# Patient Record
Sex: FEMALE | Race: WHITE | NOT HISPANIC OR LATINO | Employment: FULL TIME | URBAN - METROPOLITAN AREA
[De-identification: names, ages, dates, MRNs, and addresses within clinical notes are randomized per-mention and may not be internally consistent; named-entity substitution may affect disease eponyms.]

---

## 2023-11-01 ENCOUNTER — ANCILLARY PROCEDURE (OUTPATIENT)
Dept: GENERAL RADIOLOGY | Facility: CLINIC | Age: 53
End: 2023-11-01
Attending: NURSE PRACTITIONER
Payer: COMMERCIAL

## 2023-11-01 ENCOUNTER — OFFICE VISIT (OUTPATIENT)
Dept: FAMILY MEDICINE | Facility: CLINIC | Age: 53
End: 2023-11-01
Payer: COMMERCIAL

## 2023-11-01 VITALS
TEMPERATURE: 97.9 F | HEIGHT: 63 IN | SYSTOLIC BLOOD PRESSURE: 156 MMHG | OXYGEN SATURATION: 86 % | HEART RATE: 100 BPM | BODY MASS INDEX: 51.91 KG/M2 | WEIGHT: 293 LBS | DIASTOLIC BLOOD PRESSURE: 92 MMHG

## 2023-11-01 DIAGNOSIS — R06.02 SOB (SHORTNESS OF BREATH): Primary | ICD-10-CM

## 2023-11-01 DIAGNOSIS — Z23 NEED FOR VACCINATION: ICD-10-CM

## 2023-11-01 DIAGNOSIS — R06.02 SOB (SHORTNESS OF BREATH): ICD-10-CM

## 2023-11-01 DIAGNOSIS — I50.9 CONGESTIVE HEART FAILURE, UNSPECIFIED HF CHRONICITY, UNSPECIFIED HEART FAILURE TYPE (H): ICD-10-CM

## 2023-11-01 DIAGNOSIS — Z12.31 VISIT FOR SCREENING MAMMOGRAM: ICD-10-CM

## 2023-11-01 DIAGNOSIS — Z12.11 SCREEN FOR COLON CANCER: ICD-10-CM

## 2023-11-01 DIAGNOSIS — Z71.6 ENCOUNTER FOR TOBACCO USE CESSATION COUNSELING: ICD-10-CM

## 2023-11-01 DIAGNOSIS — R14.0 BLOATING: ICD-10-CM

## 2023-11-01 DIAGNOSIS — Z12.4 CERVICAL CANCER SCREENING: ICD-10-CM

## 2023-11-01 DIAGNOSIS — R94.31 NONSPECIFIC ABNORMAL ELECTROCARDIOGRAM (ECG) (EKG): ICD-10-CM

## 2023-11-01 DIAGNOSIS — Z11.59 NEED FOR HEPATITIS C SCREENING TEST: ICD-10-CM

## 2023-11-01 DIAGNOSIS — I10 HYPERTENSION, UNSPECIFIED TYPE: ICD-10-CM

## 2023-11-01 DIAGNOSIS — Z11.4 SCREENING FOR HIV (HUMAN IMMUNODEFICIENCY VIRUS): ICD-10-CM

## 2023-11-01 DIAGNOSIS — E66.01 MORBID OBESITY (H): ICD-10-CM

## 2023-11-01 DIAGNOSIS — F33.0 MILD RECURRENT MAJOR DEPRESSION (H): ICD-10-CM

## 2023-11-01 DIAGNOSIS — Z13.220 LIPID SCREENING: ICD-10-CM

## 2023-11-01 DIAGNOSIS — E11.59 TYPE 2 DIABETES MELLITUS WITH OTHER CIRCULATORY COMPLICATION, WITHOUT LONG-TERM CURRENT USE OF INSULIN (H): ICD-10-CM

## 2023-11-01 PROBLEM — E11.9 DIABETES MELLITUS, TYPE 2 (H): Status: ACTIVE | Noted: 2023-11-01

## 2023-11-01 LAB
ALBUMIN SERPL BCG-MCNC: 4 G/DL (ref 3.5–5.2)
ALP SERPL-CCNC: 93 U/L (ref 35–104)
ALT SERPL W P-5'-P-CCNC: 12 U/L (ref 0–50)
ANION GAP SERPL CALCULATED.3IONS-SCNC: 11 MMOL/L (ref 7–15)
AST SERPL W P-5'-P-CCNC: 33 U/L (ref 0–45)
BILIRUB SERPL-MCNC: 0.5 MG/DL
BUN SERPL-MCNC: 7.8 MG/DL (ref 6–20)
CALCIUM SERPL-MCNC: 11.1 MG/DL (ref 8.6–10)
CHLORIDE SERPL-SCNC: 97 MMOL/L (ref 98–107)
CHOLEST SERPL-MCNC: 170 MG/DL
CREAT SERPL-MCNC: 0.52 MG/DL (ref 0.51–0.95)
DEPRECATED HCO3 PLAS-SCNC: 31 MMOL/L (ref 22–29)
EGFRCR SERPLBLD CKD-EPI 2021: >90 ML/MIN/1.73M2
ERYTHROCYTE [DISTWIDTH] IN BLOOD BY AUTOMATED COUNT: 19.7 % (ref 10–15)
FSH SERPL IRP2-ACNC: 10.4 MIU/ML
GLUCOSE SERPL-MCNC: 103 MG/DL (ref 70–99)
HBA1C MFR BLD: 7.2 % (ref 0–5.6)
HCT VFR BLD AUTO: >60 % (ref 35–47)
HCV AB SERPL QL IA: NONREACTIVE
HDLC SERPL-MCNC: 34 MG/DL
HGB BLD-MCNC: 18.4 G/DL (ref 11.7–15.7)
HIV 1+2 AB+HIV1 P24 AG SERPL QL IA: NONREACTIVE
LDLC SERPL CALC-MCNC: 112 MG/DL
MCH RBC QN AUTO: 24 PG (ref 26.5–33)
MCHC RBC AUTO-ENTMCNC: 27.6 G/DL (ref 31.5–36.5)
MCV RBC AUTO: 87 FL (ref 78–100)
NONHDLC SERPL-MCNC: 136 MG/DL
PLATELET # BLD AUTO: 175 10E3/UL (ref 150–450)
POTASSIUM SERPL-SCNC: 4.9 MMOL/L (ref 3.4–5.3)
PROT SERPL-MCNC: 7.3 G/DL (ref 6.4–8.3)
RBC # BLD AUTO: 7.66 10E6/UL (ref 3.8–5.2)
SODIUM SERPL-SCNC: 139 MMOL/L (ref 135–145)
T4 FREE SERPL-MCNC: 1.35 NG/DL (ref 0.9–1.7)
TRIGL SERPL-MCNC: 119 MG/DL
TSH SERPL DL<=0.005 MIU/L-ACNC: 6.29 UIU/ML (ref 0.3–4.2)
WBC # BLD AUTO: 9.8 10E3/UL (ref 4–11)

## 2023-11-01 PROCEDURE — 93000 ELECTROCARDIOGRAM COMPLETE: CPT | Performed by: NURSE PRACTITIONER

## 2023-11-01 PROCEDURE — 84703 CHORIONIC GONADOTROPIN ASSAY: CPT | Performed by: NURSE PRACTITIONER

## 2023-11-01 PROCEDURE — 83001 ASSAY OF GONADOTROPIN (FSH): CPT | Performed by: NURSE PRACTITIONER

## 2023-11-01 PROCEDURE — 36415 COLL VENOUS BLD VENIPUNCTURE: CPT | Performed by: NURSE PRACTITIONER

## 2023-11-01 PROCEDURE — 86803 HEPATITIS C AB TEST: CPT | Performed by: NURSE PRACTITIONER

## 2023-11-01 PROCEDURE — 84439 ASSAY OF FREE THYROXINE: CPT | Performed by: NURSE PRACTITIONER

## 2023-11-01 PROCEDURE — 83036 HEMOGLOBIN GLYCOSYLATED A1C: CPT | Performed by: NURSE PRACTITIONER

## 2023-11-01 PROCEDURE — 87389 HIV-1 AG W/HIV-1&-2 AB AG IA: CPT | Performed by: NURSE PRACTITIONER

## 2023-11-01 PROCEDURE — 90715 TDAP VACCINE 7 YRS/> IM: CPT | Performed by: NURSE PRACTITIONER

## 2023-11-01 PROCEDURE — 85027 COMPLETE CBC AUTOMATED: CPT | Performed by: NURSE PRACTITIONER

## 2023-11-01 PROCEDURE — 90471 IMMUNIZATION ADMIN: CPT | Performed by: NURSE PRACTITIONER

## 2023-11-01 PROCEDURE — 80061 LIPID PANEL: CPT | Performed by: NURSE PRACTITIONER

## 2023-11-01 PROCEDURE — 71046 X-RAY EXAM CHEST 2 VIEWS: CPT | Mod: TC | Performed by: RADIOLOGY

## 2023-11-01 PROCEDURE — 80053 COMPREHEN METABOLIC PANEL: CPT | Performed by: NURSE PRACTITIONER

## 2023-11-01 PROCEDURE — 99204 OFFICE O/P NEW MOD 45 MIN: CPT | Mod: 25 | Performed by: NURSE PRACTITIONER

## 2023-11-01 PROCEDURE — 84443 ASSAY THYROID STIM HORMONE: CPT | Performed by: NURSE PRACTITIONER

## 2023-11-01 RX ORDER — ASPIRIN 81 MG/1
81 TABLET ORAL DAILY
Qty: 100 TABLET | Refills: 4 | Status: SHIPPED | OUTPATIENT
Start: 2023-11-01 | End: 2024-03-04

## 2023-11-01 RX ORDER — FUROSEMIDE 20 MG
20 TABLET ORAL DAILY
Qty: 14 TABLET | Refills: 0 | Status: SHIPPED | OUTPATIENT
Start: 2023-11-01 | End: 2023-11-21

## 2023-11-01 RX ORDER — METFORMIN HCL 500 MG
500 TABLET, EXTENDED RELEASE 24 HR ORAL 2 TIMES DAILY WITH MEALS
Qty: 180 TABLET | Refills: 0 | Status: SHIPPED | OUTPATIENT
Start: 2023-11-01 | End: 2024-01-30

## 2023-11-01 RX ORDER — LISINOPRIL 10 MG/1
10 TABLET ORAL DAILY
Qty: 30 TABLET | Refills: 0 | Status: SHIPPED | OUTPATIENT
Start: 2023-11-01 | End: 2023-11-21

## 2023-11-01 RX ORDER — BUPROPION HYDROCHLORIDE 150 MG/1
150 TABLET ORAL EVERY MORNING
Qty: 30 TABLET | Refills: 0 | Status: ON HOLD | OUTPATIENT
Start: 2023-11-01 | End: 2023-12-13

## 2023-11-01 NOTE — LETTER
November 2, 2023      Sammy Laws  [address] [place] [id number]        Dear ,    We are writing to inform you of your test results.    Lab results confirmed the diabetes that we had discussed.  The hormone test suggests that you are not postmenopausal and the calcium level is a bit high which both make me more concerned about the fluid in your abdomen.  You should try and get the CT scan done this week if possible.  Your thyroid level and cholesterol levels are a little abnormal and we will discuss those at your follow-up visit.    Resulted Orders   HIV Antigen Antibody Combo   Result Value Ref Range    HIV Antigen Antibody Combo Nonreactive Nonreactive      Comment:      HIV-1 p24 Ag & HIV-1/HIV-2 Ab Not Detected   Hepatitis C Screen Reflex to HCV RNA Quant and Genotype   Result Value Ref Range    Hepatitis C Antibody Nonreactive Nonreactive    Narrative    Assay performance characteristics have not been established for newborns, infants, and children.   Lipid panel reflex to direct LDL Non-fasting   Result Value Ref Range    Cholesterol 170 <200 mg/dL    Triglycerides 119 <150 mg/dL    Direct Measure HDL 34 (L) >=50 mg/dL    LDL Cholesterol Calculated 112 (H) <=100 mg/dL    Non HDL Cholesterol 136 (H) <130 mg/dL    Narrative    Cholesterol  Desirable:  <200 mg/dL    Triglycerides  Normal:  Less than 150 mg/dL  Borderline High:  150-199 mg/dL  High:  200-499 mg/dL  Very High:  Greater than or equal to 500 mg/dL    Direct Measure HDL  Female:  Greater than or equal to 50 mg/dL   Male:  Greater than or equal to 40 mg/dL    LDL Cholesterol  Desirable:  <100mg/dL  Above Desirable:  100-129 mg/dL   Borderline High:  130-159 mg/dL   High:  160-189 mg/dL   Very High:  >= 190 mg/dL    Non HDL Cholesterol  Desirable:  130 mg/dL  Above Desirable:  130-159 mg/dL  Borderline High:  160-189 mg/dL  High:  190-219 mg/dL  Very High:  Greater than or equal to 220 mg/dL   CBC with platelets   Result Value  Ref Range    WBC Count 9.8 4.0 - 11.0 10e3/uL    RBC Count 7.66 (H) 3.80 - 5.20 10e6/uL    Hemoglobin 18.4 (H) 11.7 - 15.7 g/dL    Hematocrit >60.0 (H) 35.0 - 47.0 %    MCV 87 78 - 100 fL    MCH 24.0 (L) 26.5 - 33.0 pg    MCHC 27.6 (L) 31.5 - 36.5 g/dL    RDW 19.7 (H) 10.0 - 15.0 %    Platelet Count 175 150 - 450 10e3/uL   Comprehensive metabolic panel   Result Value Ref Range    Sodium 139 135 - 145 mmol/L      Comment:      Reference intervals for this test were updated on 09/26/2023 to more accurately reflect our healthy population. There may be differences in the flagging of prior results with similar values performed with this method. Interpretation of those prior results can be made in the context of the updated reference intervals.     Potassium 4.9 3.4 - 5.3 mmol/L    Carbon Dioxide (CO2) 31 (H) 22 - 29 mmol/L    Anion Gap 11 7 - 15 mmol/L    Urea Nitrogen 7.8 6.0 - 20.0 mg/dL    Creatinine 0.52 0.51 - 0.95 mg/dL    GFR Estimate >90 >60 mL/min/1.73m2    Calcium 11.1 (H) 8.6 - 10.0 mg/dL    Chloride 97 (L) 98 - 107 mmol/L    Glucose 103 (H) 70 - 99 mg/dL    Alkaline Phosphatase 93 35 - 104 U/L    AST 33 0 - 45 U/L      Comment:      Reference intervals for this test were updated on 6/12/2023 to more accurately reflect our healthy population. There may be differences in the flagging of prior results with similar values performed with this method. Interpretation of those prior results can be made in the context of the updated reference intervals.    ALT 12 0 - 50 U/L      Comment:      Reference intervals for this test were updated on 6/12/2023 to more accurately reflect our healthy population. There may be differences in the flagging of prior results with similar values performed with this method. Interpretation of those prior results can be made in the context of the updated reference intervals.      Protein Total 7.3 6.4 - 8.3 g/dL    Albumin 4.0 3.5 - 5.2 g/dL    Bilirubin Total 0.5 <=1.2 mg/dL   TSH with  free T4 reflex   Result Value Ref Range    TSH 6.29 (H) 0.30 - 4.20 uIU/mL   Hemoglobin A1c   Result Value Ref Range    Hemoglobin A1C 7.2 (H) 0.0 - 5.6 %      Comment:      Normal <5.7%   Prediabetes 5.7-6.4%    Diabetes 6.5% or higher     Note: Adopted from ADA consensus guidelines.   Follicle stimulating hormone   Result Value Ref Range    FSH 10.4 mIU/mL      Comment:      19 years and older:   Follicular phase: 3.5-12.5 mIU/mL   Ovulation phase: 4.7-21.5 mIU/mL   Luteal phase: 1.7-7.7 mIU/mL   Postmenopause: 25.8-134.8 mIU/mL      T4 free   Result Value Ref Range    Free T4 1.35 0.90 - 1.70 ng/dL       If you have any questions or concerns, please call the clinic at the number listed above.       Sincerely,      Michell Lloyd NP

## 2023-11-01 NOTE — PROGRESS NOTES
Assessment & Plan     (R06.02) SOB (shortness of breath)  (primary encounter diagnosis)  Comment: xray appears as heart failur  Will start diuretic and ordered echo, set up with cardio    Concern for hepatic disease with reports of ascites and SOB and LE edema, CT as soon as creatinine returned  Plan: XR Chest 2 Views, TSH with free T4 reflex,         Hemoglobin A1c, EKG 12-lead complete w/read -         Clinics, EKG 12-lead complete w/read - Clinics,        Adult Cardiology Eval  Referral            (Z12.31) Visit for screening mammogram  Comment:   Plan: MA SCREENING DIGITAL BILAT - Future  (s+30)            (Z12.11) Screen for colon cancer  Comment:   Plan: no orders palced today    (Z11.4) Screening for HIV (human immunodeficiency virus)  Comment:   Plan: HIV Antigen Antibody Combo            (Z11.59) Need for hepatitis C screening test  Comment:   Plan: Hepatitis C Screen Reflex to HCV RNA Quant and         Genotype            (Z12.4) Cervical cancer screening  Comment:   Plan: no exam today    (R14.0) Bloating  Comment:   Plan: REVIEW OF HEALTH MAINTENANCE PROTOCOL ORDERS,         CBC with platelets, Comprehensive metabolic         panel, CT Abdomen Pelvis w Contrast, Follicle         stimulating hormone, EKG 12-lead complete         w/read - Clinics            (Z23) Need for vaccination  Comment:   Plan: TDAP 10-64Y (ADACEL,BOOSTRIX)            (Z13.220) Lipid screening  Comment:   Plan: Lipid panel reflex to direct LDL Non-fasting            (E66.01) Morbid obesity (H)  Comment:   Plan: CBC with platelets, Comprehensive metabolic         panel, CT Abdomen Pelvis w Contrast, TSH with         free T4 reflex, Hemoglobin A1c, Adult         Cardiology Eval  Referral, Nutrition         Referral            (R94.31) Nonspecific abnormal electrocardiogram (ECG) (EKG)  Comment:   Plan: Adult Cardiology Eval  Referral            (E11.59) 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 Will start asa, lisinopril and diuretic but hold metformin for about 2 weeks , she is very interested in diet control    (I10) Hypertension, unspecified type  Comment: start meds, f/unit(s) soon  Plan: lisinopril (ZESTRIL) 10 MG tablet, PRIMARY CARE        FOLLOW-UP SCHEDULING            (I50.9) Congestive heart failure, unspecified HF chronicity, unspecified heart failure type (H)  Comment:   Plan: furosemide (LASIX) 20 MG tablet, Echocardiogram        Complete            (Z71.6) Encounter for tobacco use cessation counseling  Comment:   Plan: buPROPion (WELLBUTRIN XL) 150 MG 24 hr tablet        Wants to start wellbutrin, HER request! 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 She has been exposed to tobacco smoke. She has never used smokeless tobacco.  Nicotine/Tobacco Cessation Plan:   Pharmacotherapies : bupropion (Zyban)      BMI:   Estimated body mass index is 61.38 kg/m  as calculated from the following:    Height as of this encounter: 1.6 m (5' 3\").    Weight as of this encounter: 157.2 kg (346 lb 8 oz).   Weight management plan: Discussed healthy diet and exercise guidelines Specific weight management program called Desi Solo RD discussed        Michell Lloyd NP  [facility]    Mirian Christianson is a 53 year old, presenting for the following health issues: has fluid \"collecting in my abdomen\", SOB, low oxygen level, thinks she may be going through menopause    No Medical care for about 6 years.    Smoker 1 1/2 packs per day  Alcohol use twice a month 'till drunk\"  Works factory work in [place]  Feels " "abdomen distended  Swelling in lower extremities, heat worsens it. This is worse in last 6 months    No periods for 12 months, increased bloating and gas the last 5 months. No bowel or urinary problems  Weight gaining    FH  Sister--passed away kidney disease age 50  Mother-- during  COVId unclear cause    No menses for 1 year, wonders if symptoms related to that        Shortness of Breath and Menopausal Sx        2023     7:06 AM   Additional Questions   Roomed by rebecca obando   Accompanied by self     History of Present Illness       Reason for visit:  Edema abdomen legs SOB low blood oxygen etc  Symptom onset:  More than a month    She eats 2-3 servings of fruits and vegetables daily.She consumes 0 sweetened beverage(s) daily.She exercises with enough effort to increase her heart rate 9 or less minutes per day.  She exercises with enough effort to increase her heart rate 3 or less days per week.   She is taking medications regularly.           Review of Systems         Objective    BP (!) 156/92 (BP Location: Right arm, Patient Position: Sitting)   Pulse 100   Temp 97.9  F (36.6  C)   Ht 1.6 m (5' 3\")   Wt (!) 157.2 kg (346 lb 8 oz)   LMP  (LMP Unknown)   SpO2 (!) 86%   Breastfeeding No   BMI 61.38 kg/m    Body mass index is 61.38 kg/m .  Physical Exam   GENERAL: healthy, alert and no distress  EYES: Eyes grossly normal to inspection, PERRL and conjunctivae and sclerae normal  HENT: ear canals and TM's normal, nose and mouth without ulcers or lesions  NECK: no adenopathy, no asymmetry, masses, or scars and thyroid normal to palpation  RESP: lungs clear to auscultation - no rales, rhonchi or wheezes  MS: no gross musculoskeletal defects noted, no edema  Hear RRR but tachycardic  Abdomen: round, distended, firm  Patient unable to get on cot, EKG and assessment all done in chair              Over 55 min with pt in counseling of med/disease process, assessment and coordination of care and " documentation

## 2023-11-02 ENCOUNTER — TELEPHONE (OUTPATIENT)
Dept: FAMILY MEDICINE | Facility: CLINIC | Age: 53
End: 2023-11-02
Payer: COMMERCIAL

## 2023-11-02 LAB — HCG SERPL QL: NEGATIVE

## 2023-11-02 NOTE — TELEPHONE ENCOUNTER
----- Message from Michell Lloyd NP sent at 11/2/2023  6:51 AM CDT -----  Please call her this morning and let her know that her lab results confirmed the diabetes that we had discussed.  The hormone test suggests that she is not postmenopausal and the calcium level is a bit high which both make me more concerned about the fluid in her abdomen.  She should try and get the CT scan done this week if possible.  Her thyroid level and cholesterol levels are a little abnormal and we will discuss those in her follow-up visit.  I look forward to seeing her then.  I will mail her a copy of the lab results as well.  Thanks.

## 2023-11-02 NOTE — TELEPHONE ENCOUNTER
Patient would like her lab results. Transferring to RN as patient may have questions this writer cannot answer.

## 2023-11-02 NOTE — TELEPHONE ENCOUNTER
LM on unidentified VM to call back for results. Michell did add on a serum pregnancy test that will be resulted later today.

## 2023-11-17 ENCOUNTER — HOSPITAL ENCOUNTER (OUTPATIENT)
Dept: CARDIOLOGY | Facility: CLINIC | Age: 53
Discharge: HOME | End: 2023-11-17
Attending: NURSE PRACTITIONER | Admitting: NURSE PRACTITIONER
Payer: COMMERCIAL

## 2023-11-17 ENCOUNTER — HOSPITAL ENCOUNTER (OUTPATIENT)
Dept: CT IMAGING | Facility: HOSPITAL | Age: 53
Discharge: HOME | End: 2023-11-17
Attending: NURSE PRACTITIONER | Admitting: NURSE PRACTITIONER
Payer: COMMERCIAL

## 2023-11-17 DIAGNOSIS — I50.9 CONGESTIVE HEART FAILURE, UNSPECIFIED HF CHRONICITY, UNSPECIFIED HEART FAILURE TYPE (H): ICD-10-CM

## 2023-11-17 DIAGNOSIS — R14.0 BLOATING: ICD-10-CM

## 2023-11-17 DIAGNOSIS — E66.01 MORBID OBESITY (H): ICD-10-CM

## 2023-11-17 PROCEDURE — 93306 TTE W/DOPPLER COMPLETE: CPT | Mod: 26 | Performed by: INTERNAL MEDICINE

## 2023-11-17 PROCEDURE — 74177 CT ABD & PELVIS W/CONTRAST: CPT

## 2023-11-17 PROCEDURE — 255N000002 HC RX 255 OP 636: Performed by: NURSE PRACTITIONER

## 2023-11-17 PROCEDURE — 250N000011 HC RX IP 250 OP 636: Performed by: NURSE PRACTITIONER

## 2023-11-17 RX ORDER — IOPAMIDOL 755 MG/ML
90 INJECTION, SOLUTION INTRAVASCULAR ONCE
Status: COMPLETED | OUTPATIENT
Start: 2023-11-17 | End: 2023-11-17

## 2023-11-17 RX ADMIN — IOPAMIDOL 90 ML: 755 INJECTION, SOLUTION INTRAVENOUS at 13:38

## 2023-11-17 RX ADMIN — PERFLUTREN 3 ML: 6.52 INJECTION, SUSPENSION INTRAVENOUS at 11:51

## 2023-11-21 ENCOUNTER — OFFICE VISIT (OUTPATIENT)
Dept: CARDIOLOGY | Facility: CLINIC | Age: 53
End: 2023-11-21
Attending: NURSE PRACTITIONER
Payer: COMMERCIAL

## 2023-11-21 VITALS
DIASTOLIC BLOOD PRESSURE: 102 MMHG | BODY MASS INDEX: 51.91 KG/M2 | RESPIRATION RATE: 24 BRPM | WEIGHT: 293 LBS | HEART RATE: 120 BPM | SYSTOLIC BLOOD PRESSURE: 156 MMHG | HEIGHT: 63 IN

## 2023-11-21 DIAGNOSIS — R40.0 DAYTIME SOMNOLENCE: ICD-10-CM

## 2023-11-21 DIAGNOSIS — R06.02 SOB (SHORTNESS OF BREATH): ICD-10-CM

## 2023-11-21 DIAGNOSIS — E66.01 MORBID OBESITY (H): ICD-10-CM

## 2023-11-21 DIAGNOSIS — I70.0 ATHEROSCLEROSIS OF AORTA (H): ICD-10-CM

## 2023-11-21 DIAGNOSIS — I50.9 CONGESTIVE HEART FAILURE, UNSPECIFIED HF CHRONICITY, UNSPECIFIED HEART FAILURE TYPE (H): ICD-10-CM

## 2023-11-21 DIAGNOSIS — I10 HYPERTENSION, UNSPECIFIED TYPE: Primary | ICD-10-CM

## 2023-11-21 DIAGNOSIS — Z72.0 TOBACCO ABUSE: ICD-10-CM

## 2023-11-21 DIAGNOSIS — R94.31 NONSPECIFIC ABNORMAL ELECTROCARDIOGRAM (ECG) (EKG): ICD-10-CM

## 2023-11-21 PROCEDURE — 36415 COLL VENOUS BLD VENIPUNCTURE: CPT | Performed by: INTERNAL MEDICINE

## 2023-11-21 PROCEDURE — 80053 COMPREHEN METABOLIC PANEL: CPT | Performed by: INTERNAL MEDICINE

## 2023-11-21 PROCEDURE — 99204 OFFICE O/P NEW MOD 45 MIN: CPT | Mod: 25 | Performed by: INTERNAL MEDICINE

## 2023-11-21 PROCEDURE — 83880 ASSAY OF NATRIURETIC PEPTIDE: CPT | Performed by: INTERNAL MEDICINE

## 2023-11-21 RX ORDER — FUROSEMIDE 20 MG
20 TABLET ORAL DAILY
Qty: 90 TABLET | Refills: 3 | Status: ON HOLD | OUTPATIENT
Start: 2023-11-21 | End: 2023-12-08

## 2023-11-21 RX ORDER — LOSARTAN POTASSIUM 50 MG/1
50 TABLET ORAL DAILY
Qty: 90 TABLET | Refills: 3 | Status: ON HOLD | OUTPATIENT
Start: 2023-11-21 | End: 2023-12-19

## 2023-11-21 NOTE — LETTER
11/21/2023    Physician No Ref-Primary  No address on file    RE: Sammy Laws       Dear Colleague,     I had the pleasure of seeing Sammy Laws in the [facility].      [facility]  [phone]          Assessment/Recommendations   Patient with multiple risk factors for coronary artery disease including type 2 diabetes, hypertension, probable family history of coronary artery disease and tobacco use.  She has marked shortness of breath and likely has an element of pulmonary vascular congestion which is mildly improved with oral diuretics although she is out of the diuretics.  She had documentation of atherosclerosis and near aorta and iliac sinus CT scan.  I am concerned that she has significant coronary artery disease, and also would be concerned about the possibility of sleep apnea or hypoventilation syndrome.  She also has some coughing which may well be related to the lisinopril.    We will switch her lisinopril to losartan and will add 50 mg of losartan each day.  Because of the Lasix and the lisinopril we will recheck her electrolytes and complete metabolic panel today as well as a B natruretic peptide.  The B nitric peptide may be falsely low in the setting of high BMI.    I have recommended that she have a right heart cath, left heart cath, and coronary angiogram with possibility of PCI.  She is agreeable.  I think there is a high likelihood that we would ask pulmonary to get involved and potentially schedule a screening sleep study.  Her significant other does not think that she snores dramatically but I am highly suspicious that she has significant sleep apnea.    She will need early follow-up after her heart catheterization.       History of Present Illness/Subjective    Ms. Sammy Laws is a 53 year old female with recent evaluation for a dramatic increase in shortness of breath as well as significant weight gain.  She was given a diuretic, and because of  significant hypertension she was started on lisinopril 10 mg a day.  She was set up for an echocardiogram which was technically quite difficult but there was a suspicion of normal left ventricular systolic function and no dramatic valvular heart disease.  On 20 mg of Lasix she did lose several pounds and does feel like her breathing is improved.  She certainly still has quite dramatic dyspnea on exertion and even walking into the clinic causes her to be significantly short of breath.  She denies chest discomfort, palpitations, syncope or near syncopal episodes.  She does not sleep well and kind of props herself up on her side when she sleeps.  No clear-cut paroxysmal nocturnal dyspnea.  She does awaken at times and feels like she is having a bit of a panic attack.  Her significant other says she does snore but not dramatically.  He is not aware that she quits breathing but the patient admits that she can fall asleep very easily during the day if she is not talking or involved in some type of activity.  She was also noted to have significant elevation in her hemoglobin A1c and metformin was ordered but she has not started it yet.    Risk factors include hypertension, type 2 diabetes, tobacco use, probable family history of premature coronary artery disease.  She has a sister who  of possible heart problems but was also 600 pounds.  Exact cause of death is unknown.    The patient denies a history of rheumatic fever, heart murmur, cerebrovascular accident or TIA.    She grew up in [facility].  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  She does 3,  12-hour shifts and usually overnight shift from 6 PM to 6 AM.  She does not have children.  She has a significant other who is with her at the time of our evaluation.    ECG: Personally reviewed.  Sinus rhythm with right bundle branch block  "pattern.      ______________________________________________________________________________  Procedure  Complete Echo Adult. Definity (NDC #92593-521) given intravenously.  ______________________________________________________________________________  Interpretation Summary     1. Technically difficult study despite utilization of ultrasound enhancing  agent.  2. The left ventricle is normal in size. Image quality does not provide for  detailed assessment of LV systolic function, but is felt to be mildly  decreased with a visually estimated ejection fraction of roughly 45%.  3. There is suspected mild global reduction in left ventricular systolic  performance.  4. No significant valvular heart disease is identified on this study though  the sensitivity, particularly of regurgitant lesions, is reduced due to poor  Doppler acoustics.  5. Probable mild right ventricular enlargement with mildly reduced right  ventricular systolic performance though the specificity is significantly  reduced due to suboptimal acoustic imaging.     The acoustic quality of this study is very poor. If a more accurate assessment  of left ventricular systolicperformance, regional wall motion, and other  morphology/function is required; would recommend cardiac MRI or other  alternative imaging modality for further evaluation.     Physical Examination Review of Systems   BP (!) 156/102 (BP Location: Right arm, Patient Position: Sitting, Cuff Size: Adult Large)   Pulse 120   Resp 24   Ht 1.6 m (5' 3\")   Wt (!) 151.5 kg (334 lb 1.6 oz)   LMP  (LMP Unknown)   BMI 59.18 kg/m    Body mass index is 59.18 kg/m .  Wt Readings from Last 3 Encounters:   11/21/23 (!) 151.5 kg (334 lb 1.6 oz)   11/01/23 (!) 157.2 kg (346 lb 8 oz)   12/07/15 126.3 kg (278 lb 6.4 oz)     General Appearance:   Alert, cooperative and in no acute distress.   ENT/Mouth: Pink/moist oral mucosa   EYES:  no scleral icterus, normal conjunctivae   Neck: JVP normal.  Positive " "hepatojugular reflux. Thyroid not visualized.   Chest/Lungs:   Lungs have slight diminished breath sounds at the left base equal chest wall expansion.   Cardiovascular:   S1, S2 without murmur ,clicks or rubs. Brachial, radial and posterior tibial pulses are intact and symetric. No carotid bruits noted   Abdomen:  Nontender. BS+. No bruits.  Rounded   Extremities: No cyanosis, clubbing and mild bilateral pretibial edema, more prominent on the left   Skin: no xanthelasma, warm.    Neurologic: normal arm movement bilateral, no tremors     Psychiatric: Appropriate affect.      Enc Vitals  BP: (!) 156/102  Pulse: 120  Resp: 24  Weight: (!) 151.5 kg (334 lb 1.6 oz)  Height: 160 cm (5' 3\")                                           Medical History  Surgical History Family History Social History   No past medical history on file. No past surgical history on file. No family history on file. Social History     Socioeconomic History    Marital status: Single     Spouse name: Not on file    Number of children: Not on file    Years of education: Not on file    Highest education level: Not on file   Occupational History    Not on file   Tobacco Use    Smoking status: Every Day     Types: Cigarettes     Passive exposure: Current    Smokeless tobacco: Never   Vaping Use    Vaping Use: Never used   Substance and Sexual Activity    Alcohol use: Not on file    Drug use: Not on file    Sexual activity: Not on file   Other Topics Concern    Not on file   Social History Narrative    Not on file     Social Determinants of Health     Financial Resource Strain: Low Risk  (11/1/2023)    Financial Resource Strain     Within the past 12 months, have you or your family members you live with been unable to get utilities (heat, electricity) when it was really needed?: No   Food Insecurity: Low Risk  (11/1/2023)    Food Insecurity     Within the past 12 months, did you worry that your food would run out before you got money to buy more?: No     " Within the past 12 months, did the food you bought just not last and you didn t have money to get more?: No   Transportation Needs: Low Risk  (11/1/2023)    Transportation Needs     Within the past 12 months, has lack of transportation kept you from medical appointments, getting your medicines, non-medical meetings or appointments, work, or from getting things that you need?: No   Physical Activity: Not on file   Stress: Not on file   Social Connections: Not on file   Interpersonal Safety: Not on file   Housing Stability: Low Risk  (11/1/2023)    Housing Stability     Do you have housing? : Yes     Are you worried about losing your housing?: No          Medications  Allergies   Current Outpatient Medications   Medication Sig Dispense Refill    aspirin 81 MG EC tablet Take 1 tablet (81 mg) by mouth daily 100 tablet 4    buPROPion (WELLBUTRIN XL) 150 MG 24 hr tablet Take 1 tablet (150 mg) by mouth every morning 30 tablet 0    furosemide (LASIX) 20 MG tablet Take 1 tablet (20 mg) by mouth daily 90 tablet 3    losartan (COZAAR) 50 MG tablet Take 1 tablet (50 mg) by mouth daily 90 tablet 3    metFORMIN (GLUCOPHAGE XR) 500 MG 24 hr tablet Take 1 tablet (500 mg) by mouth 2 times daily (with meals) for 90 days (Patient not taking: Reported on 11/21/2023) 180 tablet 0    No Known Allergies      Lab Results    Chemistry/lipid CBC Cardiac Enzymes/BNP/TSH/INR   Lab Results   Component Value Date    CHOL 170 11/01/2023    HDL 34 (L) 11/01/2023    TRIG 119 11/01/2023    BUN 7.8 11/01/2023     11/01/2023    CO2 31 (H) 11/01/2023    Lab Results   Component Value Date    WBC 9.8 11/01/2023    HGB 18.4 (H) 11/01/2023    HCT >60.0 (H) 11/01/2023    MCV 87 11/01/2023     11/01/2023    Lab Results   Component Value Date    TSH 6.29 (H) 11/01/2023                  Thank you for allowing me to participate in the care of your patient.      Sincerely,     Abdelrahman Miranda MD     [facility]  [facility]  cc:   Michell Lloyd NP  [address] [place] [address]

## 2023-11-21 NOTE — PROGRESS NOTES
[facility]  [phone]          Assessment/Recommendations   Patient with multiple risk factors for coronary artery disease including type 2 diabetes, hypertension, probable family history of coronary artery disease and tobacco use.  She has marked shortness of breath and likely has an element of pulmonary vascular congestion which is mildly improved with oral diuretics although she is out of the diuretics.  She had documentation of atherosclerosis and near aorta and iliac sinus CT scan.  I am concerned that she has significant coronary artery disease, and also would be concerned about the possibility of sleep apnea or hypoventilation syndrome.  She also has some coughing which may well be related to the lisinopril.    We will switch her lisinopril to losartan and will add 50 mg of losartan each day.  Because of the Lasix and the lisinopril we will recheck her electrolytes and complete metabolic panel today as well as a B natruretic peptide.  The B nitric peptide may be falsely low in the setting of high BMI.    I have recommended that she have a right heart cath, left heart cath, and coronary angiogram with possibility of PCI.  She is agreeable.  I think there is a high likelihood that we would ask pulmonary to get involved and potentially schedule a screening sleep study.  Her significant other does not think that she snores dramatically but I am highly suspicious that she has significant sleep apnea.    She will need early follow-up after her heart catheterization.       History of Present Illness/Subjective    Ms. Sammy Laws is a 53 year old female with recent evaluation for a dramatic increase in shortness of breath as well as significant weight gain.  She was given a diuretic, and because of significant hypertension she was started on lisinopril 10 mg a day.  She was set up for an echocardiogram which was technically quite difficult but there was a suspicion of normal left  ventricular systolic function and no dramatic valvular heart disease.  On 20 mg of Lasix she did lose several pounds and does feel like her breathing is improved.  She certainly still has quite dramatic dyspnea on exertion and even walking into the clinic causes her to be significantly short of breath.  She denies chest discomfort, palpitations, syncope or near syncopal episodes.  She does not sleep well and kind of props herself up on her side when she sleeps.  No clear-cut paroxysmal nocturnal dyspnea.  She does awaken at times and feels like she is having a bit of a panic attack.  Her significant other says she does snore but not dramatically.  He is not aware that she quits breathing but the patient admits that she can fall asleep very easily during the day if she is not talking or involved in some type of activity.  She was also noted to have significant elevation in her hemoglobin A1c and metformin was ordered but she has not started it yet.    Risk factors include hypertension, type 2 diabetes, tobacco use, probable family history of premature coronary artery disease.  She has a sister who  of possible heart problems but was also 600 pounds.  Exact cause of death is unknown.    The patient denies a history of rheumatic fever, heart murmur, cerebrovascular accident or TIA.    She grew up in [facility].  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  She does 3,  12-hour shifts and usually overnight shift from 6 PM to 6 AM.  She does not have children.  She has a significant other who is with her at the time of our evaluation.    ECG: Personally reviewed.  Sinus rhythm with right bundle branch block pattern.      ______________________________________________________________________________  Procedure  Complete Echo Adult. Definity ([facility] #55569-781) given intravenously.  ______________________________________________________________________________  Interpretation Summary     1.  "Technically difficult study despite utilization of ultrasound enhancing  agent.  2. The left ventricle is normal in size. Image quality does not provide for  detailed assessment of LV systolic function, but is felt to be mildly  decreased with a visually estimated ejection fraction of roughly 45%.  3. There is suspected mild global reduction in left ventricular systolic  performance.  4. No significant valvular heart disease is identified on this study though  the sensitivity, particularly of regurgitant lesions, is reduced due to poor  Doppler acoustics.  5. Probable mild right ventricular enlargement with mildly reduced right  ventricular systolic performance though the specificity is significantly  reduced due to suboptimal acoustic imaging.     The acoustic quality of this study is very poor. If a more accurate assessment  of left ventricular systolicperformance, regional wall motion, and other  morphology/function is required; would recommend cardiac MRI or other  alternative imaging modality for further evaluation.     Physical Examination Review of Systems   BP (!) 156/102 (BP Location: Right arm, Patient Position: Sitting, Cuff Size: Adult Large)   Pulse 120   Resp 24   Ht 1.6 m (5' 3\")   Wt (!) 151.5 kg (334 lb 1.6 oz)   LMP  (LMP Unknown)   BMI 59.18 kg/m    Body mass index is 59.18 kg/m .  Wt Readings from Last 3 Encounters:   11/21/23 (!) 151.5 kg (334 lb 1.6 oz)   11/01/23 (!) 157.2 kg (346 lb 8 oz)   12/07/15 126.3 kg (278 lb 6.4 oz)     General Appearance:   Alert, cooperative and in no acute distress.   ENT/Mouth: Pink/moist oral mucosa   EYES:  no scleral icterus, normal conjunctivae   Neck: JVP normal.  Positive hepatojugular reflux. Thyroid not visualized.   Chest/Lungs:   Lungs have slight diminished breath sounds at the left base equal chest wall expansion.   Cardiovascular:   S1, S2 without murmur ,clicks or rubs. Brachial, radial and posterior tibial pulses are intact and symetric. No " "carotid bruits noted   Abdomen:  Nontender. BS+. No bruits.  Rounded   Extremities: No cyanosis, clubbing and mild bilateral pretibial edema, more prominent on the left   Skin: no xanthelasma, warm.    Neurologic: normal arm movement bilateral, no tremors     Psychiatric: Appropriate affect.      Enc Vitals  BP: (!) 156/102  Pulse: 120  Resp: 24  Weight: (!) 151.5 kg (334 lb 1.6 oz)  Height: 160 cm (5' 3\")                                           Medical History  Surgical History Family History Social History   No past medical history on file. No past surgical history on file. No family history on file. Social History     Socioeconomic History    Marital status: Single     Spouse name: Not on file    Number of children: Not on file    Years of education: Not on file    Highest education level: Not on file   Occupational History    Not on file   Tobacco Use    Smoking status: Every Day     Types: Cigarettes     Passive exposure: Current    Smokeless tobacco: Never   Vaping Use    Vaping Use: Never used   Substance and Sexual Activity    Alcohol use: Not on file    Drug use: Not on file    Sexual activity: Not on file   Other Topics Concern    Not on file   Social History Narrative    Not on file     Social Determinants of Health     Financial Resource Strain: Low Risk  (11/1/2023)    Financial Resource Strain     Within the past 12 months, have you or your family members you live with been unable to get utilities (heat, electricity) when it was really needed?: No   Food Insecurity: Low Risk  (11/1/2023)    Food Insecurity     Within the past 12 months, did you worry that your food would run out before you got money to buy more?: No     Within the past 12 months, did the food you bought just not last and you didn t have money to get more?: No   Transportation Needs: Low Risk  (11/1/2023)    Transportation Needs     Within the past 12 months, has lack of transportation kept you from medical appointments, getting your " medicines, non-medical meetings or appointments, work, or from getting things that you need?: No   Physical Activity: Not on file   Stress: Not on file   Social Connections: Not on file   Interpersonal Safety: Not on file   Housing Stability: Low Risk  (11/1/2023)    Housing Stability     Do you have housing? : Yes     Are you worried about losing your housing?: No          Medications  Allergies   Current Outpatient Medications   Medication Sig Dispense Refill    aspirin 81 MG EC tablet Take 1 tablet (81 mg) by mouth daily 100 tablet 4    buPROPion (WELLBUTRIN XL) 150 MG 24 hr tablet Take 1 tablet (150 mg) by mouth every morning 30 tablet 0    furosemide (LASIX) 20 MG tablet Take 1 tablet (20 mg) by mouth daily 90 tablet 3    losartan (COZAAR) 50 MG tablet Take 1 tablet (50 mg) by mouth daily 90 tablet 3    metFORMIN (GLUCOPHAGE XR) 500 MG 24 hr tablet Take 1 tablet (500 mg) by mouth 2 times daily (with meals) for 90 days (Patient not taking: Reported on 11/21/2023) 180 tablet 0    No Known Allergies      Lab Results    Chemistry/lipid CBC Cardiac Enzymes/BNP/TSH/INR   Lab Results   Component Value Date    CHOL 170 11/01/2023    HDL 34 (L) 11/01/2023    TRIG 119 11/01/2023    BUN 7.8 11/01/2023     11/01/2023    CO2 31 (H) 11/01/2023    Lab Results   Component Value Date    WBC 9.8 11/01/2023    HGB 18.4 (H) 11/01/2023    HCT >60.0 (H) 11/01/2023    MCV 87 11/01/2023     11/01/2023    Lab Results   Component Value Date    TSH 6.29 (H) 11/01/2023

## 2023-11-21 NOTE — H&P (VIEW-ONLY)
[facility]  [phone]          Assessment/Recommendations   Patient with multiple risk factors for coronary artery disease including type 2 diabetes, hypertension, probable family history of coronary artery disease and tobacco use.  She has marked shortness of breath and likely has an element of pulmonary vascular congestion which is mildly improved with oral diuretics although she is out of the diuretics.  She had documentation of atherosclerosis and near aorta and iliac sinus CT scan.  I am concerned that she has significant coronary artery disease, and also would be concerned about the possibility of sleep apnea or hypoventilation syndrome.  She also has some coughing which may well be related to the lisinopril.    We will switch her lisinopril to losartan and will add 50 mg of losartan each day.  Because of the Lasix and the lisinopril we will recheck her electrolytes and complete metabolic panel today as well as a B natruretic peptide.  The B nitric peptide may be falsely low in the setting of high BMI.    I have recommended that she have a right heart cath, left heart cath, and coronary angiogram with possibility of PCI.  She is agreeable.  I think there is a high likelihood that we would ask pulmonary to get involved and potentially schedule a screening sleep study.  Her significant other does not think that she snores dramatically but I am highly suspicious that she has significant sleep apnea.    She will need early follow-up after her heart catheterization.       History of Present Illness/Subjective    Ms. Sammy Laws is a 53 year old female with recent evaluation for a dramatic increase in shortness of breath as well as significant weight gain.  She was given a diuretic, and because of significant hypertension she was started on lisinopril 10 mg a day.  She was set up for an echocardiogram which was technically quite difficult but there was a suspicion of normal left  ventricular systolic function and no dramatic valvular heart disease.  On 20 mg of Lasix she did lose several pounds and does feel like her breathing is improved.  She certainly still has quite dramatic dyspnea on exertion and even walking into the clinic causes her to be significantly short of breath.  She denies chest discomfort, palpitations, syncope or near syncopal episodes.  She does not sleep well and kind of props herself up on her side when she sleeps.  No clear-cut paroxysmal nocturnal dyspnea.  She does awaken at times and feels like she is having a bit of a panic attack.  Her significant other says she does snore but not dramatically.  He is not aware that she quits breathing but the patient admits that she can fall asleep very easily during the day if she is not talking or involved in some type of activity.  She was also noted to have significant elevation in her hemoglobin A1c and metformin was ordered but she has not started it yet.    Risk factors include hypertension, type 2 diabetes, tobacco use, probable family history of premature coronary artery disease.  She has a sister who  of possible heart problems but was also 600 pounds.  Exact cause of death is unknown.    The patient denies a history of rheumatic fever, heart murmur, cerebrovascular accident or TIA.    She grew up in [facility].  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  She does 3,  12-hour shifts and usually overnight shift from 6 PM to 6 AM.  She does not have children.  She has a significant other who is with her at the time of our evaluation.    ECG: Personally reviewed.  Sinus rhythm with right bundle branch block pattern.      ______________________________________________________________________________  Procedure  Complete Echo Adult. Definity ([facility] #79256-648) given intravenously.  ______________________________________________________________________________  Interpretation Summary     1.  "Technically difficult study despite utilization of ultrasound enhancing  agent.  2. The left ventricle is normal in size. Image quality does not provide for  detailed assessment of LV systolic function, but is felt to be mildly  decreased with a visually estimated ejection fraction of roughly 45%.  3. There is suspected mild global reduction in left ventricular systolic  performance.  4. No significant valvular heart disease is identified on this study though  the sensitivity, particularly of regurgitant lesions, is reduced due to poor  Doppler acoustics.  5. Probable mild right ventricular enlargement with mildly reduced right  ventricular systolic performance though the specificity is significantly  reduced due to suboptimal acoustic imaging.     The acoustic quality of this study is very poor. If a more accurate assessment  of left ventricular systolicperformance, regional wall motion, and other  morphology/function is required; would recommend cardiac MRI or other  alternative imaging modality for further evaluation.     Physical Examination Review of Systems   BP (!) 156/102 (BP Location: Right arm, Patient Position: Sitting, Cuff Size: Adult Large)   Pulse 120   Resp 24   Ht 1.6 m (5' 3\")   Wt (!) 151.5 kg (334 lb 1.6 oz)   LMP  (LMP Unknown)   BMI 59.18 kg/m    Body mass index is 59.18 kg/m .  Wt Readings from Last 3 Encounters:   11/21/23 (!) 151.5 kg (334 lb 1.6 oz)   11/01/23 (!) 157.2 kg (346 lb 8 oz)   12/07/15 126.3 kg (278 lb 6.4 oz)     General Appearance:   Alert, cooperative and in no acute distress.   ENT/Mouth: Pink/moist oral mucosa   EYES:  no scleral icterus, normal conjunctivae   Neck: JVP normal.  Positive hepatojugular reflux. Thyroid not visualized.   Chest/Lungs:   Lungs have slight diminished breath sounds at the left base equal chest wall expansion.   Cardiovascular:   S1, S2 without murmur ,clicks or rubs. Brachial, radial and posterior tibial pulses are intact and symetric. No " "carotid bruits noted   Abdomen:  Nontender. BS+. No bruits.  Rounded   Extremities: No cyanosis, clubbing and mild bilateral pretibial edema, more prominent on the left   Skin: no xanthelasma, warm.    Neurologic: normal arm movement bilateral, no tremors     Psychiatric: Appropriate affect.      Enc Vitals  BP: (!) 156/102  Pulse: 120  Resp: 24  Weight: (!) 151.5 kg (334 lb 1.6 oz)  Height: 160 cm (5' 3\")                                           Medical History  Surgical History Family History Social History   No past medical history on file. No past surgical history on file. No family history on file. Social History     Socioeconomic History    Marital status: Single     Spouse name: Not on file    Number of children: Not on file    Years of education: Not on file    Highest education level: Not on file   Occupational History    Not on file   Tobacco Use    Smoking status: Every Day     Types: Cigarettes     Passive exposure: Current    Smokeless tobacco: Never   Vaping Use    Vaping Use: Never used   Substance and Sexual Activity    Alcohol use: Not on file    Drug use: Not on file    Sexual activity: Not on file   Other Topics Concern    Not on file   Social History Narrative    Not on file     Social Determinants of Health     Financial Resource Strain: Low Risk  (11/1/2023)    Financial Resource Strain     Within the past 12 months, have you or your family members you live with been unable to get utilities (heat, electricity) when it was really needed?: No   Food Insecurity: Low Risk  (11/1/2023)    Food Insecurity     Within the past 12 months, did you worry that your food would run out before you got money to buy more?: No     Within the past 12 months, did the food you bought just not last and you didn t have money to get more?: No   Transportation Needs: Low Risk  (11/1/2023)    Transportation Needs     Within the past 12 months, has lack of transportation kept you from medical appointments, getting your " medicines, non-medical meetings or appointments, work, or from getting things that you need?: No   Physical Activity: Not on file   Stress: Not on file   Social Connections: Not on file   Interpersonal Safety: Not on file   Housing Stability: Low Risk  (11/1/2023)    Housing Stability     Do you have housing? : Yes     Are you worried about losing your housing?: No          Medications  Allergies   Current Outpatient Medications   Medication Sig Dispense Refill    aspirin 81 MG EC tablet Take 1 tablet (81 mg) by mouth daily 100 tablet 4    buPROPion (WELLBUTRIN XL) 150 MG 24 hr tablet Take 1 tablet (150 mg) by mouth every morning 30 tablet 0    furosemide (LASIX) 20 MG tablet Take 1 tablet (20 mg) by mouth daily 90 tablet 3    losartan (COZAAR) 50 MG tablet Take 1 tablet (50 mg) by mouth daily 90 tablet 3    metFORMIN (GLUCOPHAGE XR) 500 MG 24 hr tablet Take 1 tablet (500 mg) by mouth 2 times daily (with meals) for 90 days (Patient not taking: Reported on 11/21/2023) 180 tablet 0    No Known Allergies      Lab Results    Chemistry/lipid CBC Cardiac Enzymes/BNP/TSH/INR   Lab Results   Component Value Date    CHOL 170 11/01/2023    HDL 34 (L) 11/01/2023    TRIG 119 11/01/2023    BUN 7.8 11/01/2023     11/01/2023    CO2 31 (H) 11/01/2023    Lab Results   Component Value Date    WBC 9.8 11/01/2023    HGB 18.4 (H) 11/01/2023    HCT >60.0 (H) 11/01/2023    MCV 87 11/01/2023     11/01/2023    Lab Results   Component Value Date    TSH 6.29 (H) 11/01/2023

## 2023-11-22 ENCOUNTER — TELEPHONE (OUTPATIENT)
Dept: CARDIOLOGY | Facility: CLINIC | Age: 53
End: 2023-11-22
Payer: COMMERCIAL

## 2023-11-22 DIAGNOSIS — R94.31 NONSPECIFIC ABNORMAL ELECTROCARDIOGRAM (ECG) (EKG): ICD-10-CM

## 2023-11-22 DIAGNOSIS — I10 HYPERTENSION, UNSPECIFIED TYPE: ICD-10-CM

## 2023-11-22 DIAGNOSIS — I50.9 CONGESTIVE HEART FAILURE, UNSPECIFIED HF CHRONICITY, UNSPECIFIED HEART FAILURE TYPE (H): ICD-10-CM

## 2023-11-22 DIAGNOSIS — I70.0 ATHEROSCLEROSIS OF AORTA (H): ICD-10-CM

## 2023-11-22 DIAGNOSIS — R06.02 SOB (SHORTNESS OF BREATH): Primary | ICD-10-CM

## 2023-11-22 DIAGNOSIS — E66.01 MORBID OBESITY (H): ICD-10-CM

## 2023-11-22 LAB
ALBUMIN SERPL BCG-MCNC: 3.8 G/DL (ref 3.5–5.2)
ALP SERPL-CCNC: 96 U/L (ref 40–150)
ALT SERPL W P-5'-P-CCNC: 14 U/L (ref 0–50)
ANION GAP SERPL CALCULATED.3IONS-SCNC: 13 MMOL/L (ref 7–15)
AST SERPL W P-5'-P-CCNC: 25 U/L (ref 0–45)
BILIRUB SERPL-MCNC: 1 MG/DL
BUN SERPL-MCNC: 7.8 MG/DL (ref 6–20)
CALCIUM SERPL-MCNC: 11.2 MG/DL (ref 8.6–10)
CHLORIDE SERPL-SCNC: 95 MMOL/L (ref 98–107)
CREAT SERPL-MCNC: 0.59 MG/DL (ref 0.51–0.95)
DEPRECATED HCO3 PLAS-SCNC: 29 MMOL/L (ref 22–29)
EGFRCR SERPLBLD CKD-EPI 2021: >90 ML/MIN/1.73M2
GLUCOSE SERPL-MCNC: 99 MG/DL (ref 70–99)
NT-PROBNP SERPL-MCNC: 951 PG/ML (ref 0–900)
POTASSIUM SERPL-SCNC: 5.4 MMOL/L (ref 3.4–5.3)
PROT SERPL-MCNC: 6.9 G/DL (ref 6.4–8.3)
SODIUM SERPL-SCNC: 137 MMOL/L (ref 135–145)

## 2023-11-22 NOTE — TELEPHONE ENCOUNTER
Spoke with Pt regarding patient education for cor poss l/r hc. Pt expressed understanding-[facility] for Pt regarding education for upcoming procedure-PATIENCE      From: Nereida Alberto  Sent: 11/27/2023   8:33 AM CST  To: Radha Phoenix  Subject: RE: [facility] pt                                       Case type: L/R HC, CA POSS PCI    Procedure Physician(s): TK/RA    Procedure Date and Patient Arrival Time: Friday 12/8, with arrival time of 7AM    H&P: Completed on 11/21 [facility]    Pre-Procedure Lab Appt: ON ADMISSION  - Please place lab orders if you haven't already!    Alerts/Important Info: diabetic, renal protocol, allergy to latex    THANKS!  JUANITA   ----- Message -----  From: Radha Phoenix  Sent: 11/24/2023   1:37 PM CST  To: [facility] Procedure  Pool - Lhe  Subject: [facility] pt                                           Case request: L/R HC, Cor Poss PCI  Ordering provider: RENATE  H/P [facility] 11/21  Alerts: diabetic, renal protocol, allergy to latex, can only do Thursdays and Fridays. Labs on admission    Thank you    Radha Laws  [id number] [facility] [address]  [phone] (home)     PCP:  Michell Lloyd  H&P completed by:  [facility] 11/21  Admit date 12/08 Arrival time:  0700  Anticoagulation:  NA  Previous PCI: No  Bypass Grafts: No  Renal Issues: Yes  Diabetic?: No  Device?: No  Type:    Ambulation status: independent    Reason for Visit:  Patient seen for pre-procedure education in preparation for: Left Heart Catheterization and Coronary Angiogram with Possible PCI    Procedure Prep:  EKG results obtained, dated: To be completed on day of cath lab procedure  Hemogram results obtained: To be completed on day of cath lab procedure  Basic Metabolic Panel results obtained: To be completed on day of cath lab procedure  Pertinent cardiac test results:     Patient Education    Your arrival time is 0700.  Location is [address] [address] - Main Entrance of the  Hospital  Please plan on being at the hospital all day.  At any time, emergencies and/or urgent cases may come up which could delay the start of your procedure.    COVID Testing Instructions  *Mandatory COVID Testing: ALL Patients will need to complete a COVID test no sooner than 4 days prior to their procedure (regardless of vaccination status).    To schedule COVID testing Please call [phone]  If you want to complete this at an outside facility please call them to find out if they will have the results within the appropriate time frame and their fax number.  You will need to provide us with that information so we can send the order.  The facility completing the test will need to fax the results to [phone]  If you are running into and issues please call us.     Pre-procedure instructions  Take your temperature in the morning prior to coming in.  If your temperature is 100 F please call [facility] [phone] and notify them.  If you do not have access to a thermometer at home, please come in for testing.  If you are running a temperature your procedure may be rescheduled.  Patient instructed to not Eat or Drink after midnight.  Patient instructed to shower the evening before or the morning of the procedure.  Patient instructed to arrange for transportation home following procedure from a responsible family member or friend. No driving for at least 24 hours.  Patient instructed to have a responsible adult with them for 24 hours post-procedure.  Post-procedure follow up process.  Conscious sedation discussed.      Pre-procedure medication instructions.  Continue medications as scheduled, with a small amount of water on the day of the procedure unless indicated. (NO Diabetic Medications or Blood Thinners)  Pt instructed not to consume Alcohol, Tobacco, Caffeine, or Carbonated beverages 12 hours prior to procedure.  Patient instructed to take 324 mg mg of Aspirin the morning of procedure: Yes               Diabetic Medication Instructions  DO NOT take any oral diabetic medication, short-acting diabetes medications/insulin, humalog or regular insulin the morning of your test  Take   dose of long-acting insulin (Lantus, Levemir) the day of your test  Hold Oral Diabetic on the day of the procedure and for 48 hours after IV contrast given  Remember to  bring your glucometer and insulin with you to take after your test if needed                  Patient states understanding of procedure and agrees to proceed.    *PATIENTS RECORDS AVAILABLE IN [facility] UNLESS OTHERWISE INDICATED*      Patient Active Problem List   Diagnosis    Diabetes mellitus, type 2 (H)    Hypertension, unspecified type    Atherosclerosis of aorta (H24)    Nonspecific abnormal electrocardiogram (ECG) (EKG)    Daytime somnolence    Tobacco abuse       Current Outpatient Medications   Medication Sig Dispense Refill    aspirin 81 MG EC tablet Take 1 tablet (81 mg) by mouth daily 100 tablet 4    buPROPion (WELLBUTRIN XL) 150 MG 24 hr tablet Take 1 tablet (150 mg) by mouth every morning 30 tablet 0    furosemide (LASIX) 20 MG tablet Take 1 tablet (20 mg) by mouth daily 90 tablet 3    losartan (COZAAR) 50 MG tablet Take 1 tablet (50 mg) by mouth daily 90 tablet 3    metFORMIN (GLUCOPHAGE XR) 500 MG 24 hr tablet Take 1 tablet (500 mg) by mouth 2 times daily (with meals) for 90 days (Patient not taking: Reported on 11/21/2023) 180 tablet 0       No Known Allergies    Radha Phoenix on 12/6/2023 at 9:46 AM      ================================================================  Spoke with Pt regarding recommendations from Dr. Miranda. Pt in agreement. Message sent to scheduling. Has not started losartan and furosemide due to holiday/pharmacy hours and work schedule. Pt will  prescription today. Will avoid potassium rich foods, monitor BP, decrease salt intake and present to ED for new or worsening symptoms. _KC      From: Abdelrahman Miranda MD  Sent: 11/24/2023    7:07 AM CST  To: Radha Garcia,    Potassium a bit high.  Could we recheck a basic metabolic panel on Monday.  B natruretic peptide a little high and want her to continue the diuretic.  She should record some blood pressures so that when we call her back with a basic metabolic panel and Monday or Tuesday we can get some blood pressures from her.    She should be scheduled for heart catheterization per my note.    Thanks,    Abdelrahman      From: Abdelrahman Miranda MD  Sent: 11/21/2023   5:12 PM CST  To: Radha Garcia,    Could you set this patient up for right heart cath, left heart cath, coronary angiogram with possible PCI.  Diagnosis is atherosclerosis, heart failure and shortness of breath with elevated BMI.

## 2023-11-27 ENCOUNTER — PREP FOR PROCEDURE (OUTPATIENT)
Dept: CARDIOLOGY | Facility: CLINIC | Age: 53
End: 2023-11-27
Payer: COMMERCIAL

## 2023-11-27 DIAGNOSIS — R94.31 NONSPECIFIC ABNORMAL ELECTROCARDIOGRAM (ECG) (EKG): ICD-10-CM

## 2023-11-27 DIAGNOSIS — I70.0 ATHEROSCLEROSIS OF AORTA (H): ICD-10-CM

## 2023-11-27 DIAGNOSIS — E66.01 MORBID OBESITY (H): ICD-10-CM

## 2023-11-27 DIAGNOSIS — I50.9 CONGESTIVE HEART FAILURE, UNSPECIFIED HF CHRONICITY, UNSPECIFIED HEART FAILURE TYPE (H): ICD-10-CM

## 2023-11-27 DIAGNOSIS — I10 HYPERTENSION, UNSPECIFIED TYPE: ICD-10-CM

## 2023-11-27 DIAGNOSIS — R06.02 SOB (SHORTNESS OF BREATH): Primary | ICD-10-CM

## 2023-11-27 RX ORDER — ASPIRIN 81 MG/1
243 TABLET, CHEWABLE ORAL ONCE
Status: CANCELLED | OUTPATIENT
Start: 2023-11-27

## 2023-11-27 RX ORDER — LIDOCAINE 40 MG/G
CREAM TOPICAL
Status: CANCELLED | OUTPATIENT
Start: 2023-11-27

## 2023-11-27 RX ORDER — ASPIRIN 325 MG
325 TABLET ORAL ONCE
Status: CANCELLED | OUTPATIENT
Start: 2023-11-27 | End: 2023-11-27

## 2023-11-27 RX ORDER — SODIUM CHLORIDE 9 MG/ML
INJECTION, SOLUTION INTRAVENOUS CONTINUOUS
Status: CANCELLED | OUTPATIENT
Start: 2023-11-27

## 2023-11-30 ENCOUNTER — HOSPITAL ENCOUNTER (OUTPATIENT)
Dept: MAMMOGRAPHY | Facility: CLINIC | Age: 53
Discharge: HOME | End: 2023-11-30
Attending: NURSE PRACTITIONER | Admitting: NURSE PRACTITIONER
Payer: COMMERCIAL

## 2023-11-30 DIAGNOSIS — Z12.31 VISIT FOR SCREENING MAMMOGRAM: ICD-10-CM

## 2023-11-30 PROCEDURE — 77067 SCR MAMMO BI INCL CAD: CPT

## 2023-12-06 RX ORDER — DEXTROSE MONOHYDRATE 25 G/50ML
25-50 INJECTION, SOLUTION INTRAVENOUS
Status: CANCELLED | OUTPATIENT
Start: 2023-12-08

## 2023-12-06 RX ORDER — LIDOCAINE 40 MG/G
CREAM TOPICAL
Status: CANCELLED | OUTPATIENT
Start: 2023-12-06

## 2023-12-06 RX ORDER — NICOTINE POLACRILEX 4 MG
15-30 LOZENGE BUCCAL
Status: CANCELLED | OUTPATIENT
Start: 2023-12-08

## 2023-12-06 RX ORDER — FENTANYL CITRATE 50 UG/ML
25 INJECTION, SOLUTION INTRAMUSCULAR; INTRAVENOUS
Status: CANCELLED | OUTPATIENT
Start: 2023-12-08

## 2023-12-06 RX ORDER — SODIUM CHLORIDE 9 MG/ML
INJECTION, SOLUTION INTRAVENOUS CONTINUOUS
Status: CANCELLED | OUTPATIENT
Start: 2023-12-08

## 2023-12-06 RX ORDER — ASPIRIN 81 MG/1
243 TABLET, CHEWABLE ORAL ONCE
Status: CANCELLED | OUTPATIENT
Start: 2023-12-08

## 2023-12-06 RX ORDER — ASPIRIN 325 MG
325 TABLET ORAL ONCE
Status: CANCELLED | OUTPATIENT
Start: 2023-12-08 | End: 2023-12-06

## 2023-12-08 ENCOUNTER — APPOINTMENT (OUTPATIENT)
Dept: RADIOLOGY | Facility: HOSPITAL | Age: 53
DRG: 286 | End: 2023-12-08
Payer: COMMERCIAL

## 2023-12-08 ENCOUNTER — HOSPITAL ENCOUNTER (INPATIENT)
Facility: HOSPITAL | Age: 53
LOS: 11 days | Discharge: HOME | DRG: 286 | End: 2023-12-19
Attending: INTERNAL MEDICINE | Admitting: INTERNAL MEDICINE
Payer: COMMERCIAL

## 2023-12-08 DIAGNOSIS — Z72.0 TOBACCO ABUSE: ICD-10-CM

## 2023-12-08 DIAGNOSIS — R94.31 NONSPECIFIC ABNORMAL ELECTROCARDIOGRAM (ECG) (EKG): ICD-10-CM

## 2023-12-08 DIAGNOSIS — K21.9 GASTROESOPHAGEAL REFLUX DISEASE WITHOUT ESOPHAGITIS: ICD-10-CM

## 2023-12-08 DIAGNOSIS — E66.2 OBESITY HYPOVENTILATION SYNDROME (H): ICD-10-CM

## 2023-12-08 DIAGNOSIS — R40.0 DAYTIME SOMNOLENCE: ICD-10-CM

## 2023-12-08 DIAGNOSIS — E66.01 MORBID OBESITY (H): ICD-10-CM

## 2023-12-08 DIAGNOSIS — R06.02 SOB (SHORTNESS OF BREATH): Primary | ICD-10-CM

## 2023-12-08 DIAGNOSIS — I70.0 ATHEROSCLEROSIS OF AORTA (H): ICD-10-CM

## 2023-12-08 DIAGNOSIS — I10 HYPERTENSION, UNSPECIFIED TYPE: ICD-10-CM

## 2023-12-08 DIAGNOSIS — I50.9 CONGESTIVE HEART FAILURE, UNSPECIFIED HF CHRONICITY, UNSPECIFIED HEART FAILURE TYPE (H): ICD-10-CM

## 2023-12-08 LAB
ABO/RH(D): NORMAL
ACT BLD: 166 SECONDS (ref 74–150)
ACT BLD: 201 SECONDS (ref 74–150)
ACT BLD: 383 SECONDS (ref 74–150)
ALLEN'S TEST: YES
ANION GAP SERPL CALCULATED.3IONS-SCNC: 6 MMOL/L (ref 7–15)
ANION GAP SERPL CALCULATED.3IONS-SCNC: 9 MMOL/L (ref 7–15)
ANTIBODY SCREEN: NEGATIVE
ATRIAL RATE - MUSE: 105 BPM
BASE EXCESS BLDA CALC-SCNC: 11.8 MMOL/L
BASE EXCESS BLDA CALC-SCNC: 11.9 MMOL/L
BASE EXCESS BLDA CALC-SCNC: 9.5 MMOL/L
BASE EXCESS BLDV CALC-SCNC: 9.4 MMOL/L
BASE EXCESS BLDV CALC-SCNC: 9.6 MMOL/L
BUN SERPL-MCNC: 8.2 MG/DL (ref 6–20)
BUN SERPL-MCNC: 8.4 MG/DL (ref 6–20)
CALCIUM SERPL-MCNC: 10.4 MG/DL (ref 8.6–10)
CALCIUM SERPL-MCNC: 10.6 MG/DL (ref 8.6–10)
CHLORIDE SERPL-SCNC: 98 MMOL/L (ref 98–107)
CHLORIDE SERPL-SCNC: 99 MMOL/L (ref 98–107)
CHOLEST SERPL-MCNC: 149 MG/DL
COHGB MFR BLD: 72.8 % (ref 96–97)
COHGB MFR BLD: 91 % (ref 96–97)
COHGB MFR BLD: 92 % (ref 96–97)
CREAT SERPL-MCNC: 0.49 MG/DL (ref 0.51–0.95)
CREAT SERPL-MCNC: 0.5 MG/DL (ref 0.51–0.95)
DEPRECATED HCO3 PLAS-SCNC: 31 MMOL/L (ref 22–29)
DEPRECATED HCO3 PLAS-SCNC: 33 MMOL/L (ref 22–29)
DIASTOLIC BLOOD PRESSURE - MUSE: NORMAL MMHG
EGFRCR SERPLBLD CKD-EPI 2021: >90 ML/MIN/1.73M2
EGFRCR SERPLBLD CKD-EPI 2021: >90 ML/MIN/1.73M2
ERYTHROCYTE [DISTWIDTH] IN BLOOD BY AUTOMATED COUNT: 21.7 % (ref 10–15)
FASTING STATUS PATIENT QL REPORTED: YES
GLUCOSE BLDC GLUCOMTR-MCNC: 109 MG/DL (ref 70–99)
GLUCOSE BLDC GLUCOMTR-MCNC: 130 MG/DL (ref 70–99)
GLUCOSE SERPL-MCNC: 114 MG/DL (ref 70–99)
GLUCOSE SERPL-MCNC: 125 MG/DL (ref 70–99)
HCG INTACT+B SERPL-ACNC: 1 MIU/ML
HCO3 BLD-SCNC: 36 MMOL/L (ref 23–29)
HCO3 BLD-SCNC: 39 MMOL/L (ref 23–29)
HCO3 BLD-SCNC: 39 MMOL/L (ref 23–29)
HCO3 BLDV-SCNC: 28 MMOL/L (ref 24–30)
HCO3 BLDV-SCNC: 29 MMOL/L (ref 24–30)
HCT VFR BLD AUTO: 66.3 % (ref 35–47)
HDLC SERPL-MCNC: 34 MG/DL
HGB BLD-MCNC: 18.4 G/DL (ref 11.7–15.7)
HOLD SPECIMEN: NORMAL
INTERPRETATION ECG - MUSE: NORMAL
LDLC SERPL CALC-MCNC: 93 MG/DL
MAGNESIUM SERPL-MCNC: 1.6 MG/DL (ref 1.7–2.3)
MCH RBC QN AUTO: 23.8 PG (ref 26.5–33)
MCHC RBC AUTO-ENTMCNC: 27.8 G/DL (ref 31.5–36.5)
MCV RBC AUTO: 86 FL (ref 78–100)
NONHDLC SERPL-MCNC: 115 MG/DL
NT-PROBNP SERPL-MCNC: 749 PG/ML (ref 0–900)
O2/TOTAL GAS SETTING VFR VENT: 40 %
O2/TOTAL GAS SETTING VFR VENT: 40 %
OXYHGB MFR BLD: 69.6 % (ref 96–97)
OXYHGB MFR BLD: 86.1 % (ref 96–97)
OXYHGB MFR BLD: 87 % (ref 96–97)
P AXIS - MUSE: 42 DEGREES
PCO2 BLD: 71 MM HG (ref 35–45)
PCO2 BLD: 84 MM HG (ref 35–45)
PCO2 BLD: 89 MM HG (ref 35–45)
PCO2 BLDV: 83 MM HG (ref 35–50)
PCO2 BLDV: 84 MM HG (ref 35–50)
PEEP: 6 CM H2O
PEEP: 6 CM H2O
PH BLD: 7.25 [PH] (ref 7.37–7.44)
PH BLD: 7.27 [PH] (ref 7.37–7.44)
PH BLD: 7.31 [PH] (ref 7.37–7.44)
PH BLDV: 7.25 [PH] (ref 7.35–7.45)
PH BLDV: 7.25 [PH] (ref 7.35–7.45)
PLATELET # BLD AUTO: 159 10E3/UL (ref 150–450)
PO2 BLD: 39 MM HG (ref 80–90)
PO2 BLD: 65 MM HG (ref 80–90)
PO2 BLD: 66 MM HG (ref 80–90)
PO2 BLDV: 46 MM HG (ref 25–47)
PO2 BLDV: 76 MM HG (ref 25–47)
POTASSIUM SERPL-SCNC: 4.4 MMOL/L (ref 3.4–5.3)
POTASSIUM SERPL-SCNC: 5 MMOL/L (ref 3.4–5.3)
PR INTERVAL - MUSE: 172 MS
PSV (LAB BLOOD GAS): 12 CM H2O
PSV (LAB BLOOD GAS): 16 CM H2O
QRS DURATION - MUSE: 94 MS
QT - MUSE: 366 MS
QTC - MUSE: 483 MS
R AXIS - MUSE: 52 DEGREES
RATE: 14 RR/MIN
RATE: 14 RR/MIN
RBC # BLD AUTO: 7.74 10E6/UL (ref 3.8–5.2)
SATV LHE POCT: 76.8 % (ref 70–75)
SATV LHE POCT: 93.7 % (ref 70–75)
SODIUM SERPL-SCNC: 137 MMOL/L (ref 135–145)
SODIUM SERPL-SCNC: 139 MMOL/L (ref 135–145)
SPECIMEN EXPIRATION DATE: NORMAL
SYSTOLIC BLOOD PRESSURE - MUSE: NORMAL MMHG
T AXIS - MUSE: 35 DEGREES
TEMPERATURE: 37 DEGREES C
TRIGL SERPL-MCNC: 108 MG/DL
TSH SERPL DL<=0.005 MIU/L-ACNC: 1.47 UIU/ML (ref 0.3–4.2)
VENTILATION MODE: ABNORMAL
VENTILATION MODE: ABNORMAL
VENTRICULAR RATE- MUSE: 105 BPM
WBC # BLD AUTO: 8 10E3/UL (ref 4–11)

## 2023-12-08 PROCEDURE — 99152 MOD SED SAME PHYS/QHP 5/>YRS: CPT | Performed by: INTERNAL MEDICINE

## 2023-12-08 PROCEDURE — 80048 BASIC METABOLIC PNL TOTAL CA: CPT

## 2023-12-08 PROCEDURE — C1894 INTRO/SHEATH, NON-LASER: HCPCS | Performed by: INTERNAL MEDICINE

## 2023-12-08 PROCEDURE — 36415 COLL VENOUS BLD VENIPUNCTURE: CPT | Performed by: INTERNAL MEDICINE

## 2023-12-08 PROCEDURE — 272N000001 HC OR GENERAL SUPPLY STERILE: Performed by: INTERNAL MEDICINE

## 2023-12-08 PROCEDURE — 5A09457 ASSISTANCE WITH RESPIRATORY VENTILATION, 24-96 CONSECUTIVE HOURS, CONTINUOUS POSITIVE AIRWAY PRESSURE: ICD-10-PCS

## 2023-12-08 PROCEDURE — 80061 LIPID PANEL: CPT | Performed by: INTERNAL MEDICINE

## 2023-12-08 PROCEDURE — 99153 MOD SED SAME PHYS/QHP EA: CPT | Performed by: INTERNAL MEDICINE

## 2023-12-08 PROCEDURE — 93010 ELECTROCARDIOGRAM REPORT: CPT | Performed by: INTERNAL MEDICINE

## 2023-12-08 PROCEDURE — C1769 GUIDE WIRE: HCPCS | Performed by: INTERNAL MEDICINE

## 2023-12-08 PROCEDURE — 85347 COAGULATION TIME ACTIVATED: CPT

## 2023-12-08 PROCEDURE — 210N000001 HC R&B IMCU HEART CARE

## 2023-12-08 PROCEDURE — 84702 CHORIONIC GONADOTROPIN TEST: CPT | Performed by: INTERNAL MEDICINE

## 2023-12-08 PROCEDURE — 250N000009 HC RX 250: Performed by: INTERNAL MEDICINE

## 2023-12-08 PROCEDURE — 250N000011 HC RX IP 250 OP 636: Mod: JZ | Performed by: INTERNAL MEDICINE

## 2023-12-08 PROCEDURE — 36600 WITHDRAWAL OF ARTERIAL BLOOD: CPT

## 2023-12-08 PROCEDURE — 93460 R&L HRT ART/VENTRICLE ANGIO: CPT | Mod: 26 | Performed by: INTERNAL MEDICINE

## 2023-12-08 PROCEDURE — 258N000003 HC RX IP 258 OP 636: Performed by: INTERNAL MEDICINE

## 2023-12-08 PROCEDURE — B211YZZ FLUOROSCOPY OF MULTIPLE CORONARY ARTERIES USING OTHER CONTRAST: ICD-10-PCS | Performed by: INTERNAL MEDICINE

## 2023-12-08 PROCEDURE — 71045 X-RAY EXAM CHEST 1 VIEW: CPT

## 2023-12-08 PROCEDURE — 999N000157 HC STATISTIC RCP TIME EA 10 MIN

## 2023-12-08 PROCEDURE — 82805 BLOOD GASES W/O2 SATURATION: CPT

## 2023-12-08 PROCEDURE — 250N000013 HC RX MED GY IP 250 OP 250 PS 637: Performed by: INTERNAL MEDICINE

## 2023-12-08 PROCEDURE — 83735 ASSAY OF MAGNESIUM: CPT

## 2023-12-08 PROCEDURE — C1887 CATHETER, GUIDING: HCPCS | Performed by: INTERNAL MEDICINE

## 2023-12-08 PROCEDURE — C1725 CATH, TRANSLUMIN NON-LASER: HCPCS | Performed by: INTERNAL MEDICINE

## 2023-12-08 PROCEDURE — 99255 IP/OBS CONSLTJ NEW/EST HI 80: CPT | Mod: FS

## 2023-12-08 PROCEDURE — 4A023N6 MEASUREMENT OF CARDIAC SAMPLING AND PRESSURE, RIGHT HEART, PERCUTANEOUS APPROACH: ICD-10-PCS | Performed by: INTERNAL MEDICINE

## 2023-12-08 PROCEDURE — 4A0335C MEASUREMENT OF ARTERIAL FLOW, CORONARY, PERCUTANEOUS APPROACH: ICD-10-PCS | Performed by: INTERNAL MEDICINE

## 2023-12-08 PROCEDURE — 80048 BASIC METABOLIC PNL TOTAL CA: CPT | Performed by: INTERNAL MEDICINE

## 2023-12-08 PROCEDURE — 94660 CPAP INITIATION&MGMT: CPT

## 2023-12-08 PROCEDURE — 93456 R HRT CORONARY ARTERY ANGIO: CPT | Performed by: INTERNAL MEDICINE

## 2023-12-08 PROCEDURE — 84443 ASSAY THYROID STIM HORMONE: CPT

## 2023-12-08 PROCEDURE — 83880 ASSAY OF NATRIURETIC PEPTIDE: CPT

## 2023-12-08 PROCEDURE — 255N000002 HC RX 255 OP 636: Performed by: INTERNAL MEDICINE

## 2023-12-08 PROCEDURE — 85027 COMPLETE CBC AUTOMATED: CPT | Performed by: INTERNAL MEDICINE

## 2023-12-08 PROCEDURE — 82805 BLOOD GASES W/O2 SATURATION: CPT | Performed by: INTERNAL MEDICINE

## 2023-12-08 PROCEDURE — 93799 UNLISTED CV SVC/PROCEDURE: CPT | Performed by: INTERNAL MEDICINE

## 2023-12-08 PROCEDURE — 250N000011 HC RX IP 250 OP 636: Performed by: INTERNAL MEDICINE

## 2023-12-08 PROCEDURE — 99207 PR APP CREDIT; MD BILLING SHARED VISIT: CPT | Performed by: INTERNAL MEDICINE

## 2023-12-08 PROCEDURE — 82962 GLUCOSE BLOOD TEST: CPT

## 2023-12-08 PROCEDURE — 93005 ELECTROCARDIOGRAM TRACING: CPT

## 2023-12-08 PROCEDURE — 250N000013 HC RX MED GY IP 250 OP 250 PS 637: Performed by: NURSE PRACTITIONER

## 2023-12-08 PROCEDURE — 93571 IV DOP VEL&/PRESS C FLO 1ST: CPT | Mod: 26 | Performed by: INTERNAL MEDICINE

## 2023-12-08 PROCEDURE — 36415 COLL VENOUS BLD VENIPUNCTURE: CPT

## 2023-12-08 PROCEDURE — 86900 BLOOD TYPING SEROLOGIC ABO: CPT | Performed by: INTERNAL MEDICINE

## 2023-12-08 PROCEDURE — 93460 R&L HRT ART/VENTRICLE ANGIO: CPT | Performed by: INTERNAL MEDICINE

## 2023-12-08 RX ORDER — LIDOCAINE 40 MG/G
CREAM TOPICAL
Status: DISCONTINUED | OUTPATIENT
Start: 2023-12-08 | End: 2023-12-19 | Stop reason: HOSPADM

## 2023-12-08 RX ORDER — FENTANYL CITRATE 50 UG/ML
25 INJECTION, SOLUTION INTRAMUSCULAR; INTRAVENOUS
Status: DISCONTINUED | OUTPATIENT
Start: 2023-12-08 | End: 2023-12-08 | Stop reason: HOSPADM

## 2023-12-08 RX ORDER — NALOXONE HYDROCHLORIDE 0.4 MG/ML
0.4 INJECTION, SOLUTION INTRAMUSCULAR; INTRAVENOUS; SUBCUTANEOUS
Status: ACTIVE | OUTPATIENT
Start: 2023-12-08 | End: 2023-12-08

## 2023-12-08 RX ORDER — DEXTROSE MONOHYDRATE 25 G/50ML
25-50 INJECTION, SOLUTION INTRAVENOUS
Status: DISCONTINUED | OUTPATIENT
Start: 2023-12-08 | End: 2023-12-08 | Stop reason: HOSPADM

## 2023-12-08 RX ORDER — BUPROPION HYDROCHLORIDE 150 MG/1
150 TABLET ORAL EVERY MORNING
Status: DISCONTINUED | OUTPATIENT
Start: 2023-12-09 | End: 2023-12-19 | Stop reason: HOSPADM

## 2023-12-08 RX ORDER — NALOXONE HYDROCHLORIDE 0.4 MG/ML
0.2 INJECTION, SOLUTION INTRAMUSCULAR; INTRAVENOUS; SUBCUTANEOUS
Status: ACTIVE | OUTPATIENT
Start: 2023-12-08 | End: 2023-12-08

## 2023-12-08 RX ORDER — MAGNESIUM SULFATE HEPTAHYDRATE 40 MG/ML
2 INJECTION, SOLUTION INTRAVENOUS ONCE
Status: COMPLETED | OUTPATIENT
Start: 2023-12-08 | End: 2023-12-08

## 2023-12-08 RX ORDER — ATROPINE SULFATE 0.1 MG/ML
0.5 INJECTION INTRAVENOUS
Status: ACTIVE | OUTPATIENT
Start: 2023-12-08 | End: 2023-12-08

## 2023-12-08 RX ORDER — NICOTINE POLACRILEX 4 MG
15-30 LOZENGE BUCCAL
Status: DISCONTINUED | OUTPATIENT
Start: 2023-12-08 | End: 2023-12-08 | Stop reason: HOSPADM

## 2023-12-08 RX ORDER — TORSEMIDE 20 MG/1
TABLET ORAL
Qty: 90 TABLET | Refills: 3 | Status: SHIPPED | OUTPATIENT
Start: 2023-12-08 | End: 2024-03-27

## 2023-12-08 RX ORDER — FUROSEMIDE 10 MG/ML
INJECTION INTRAMUSCULAR; INTRAVENOUS
Status: DISCONTINUED | OUTPATIENT
Start: 2023-12-08 | End: 2023-12-08 | Stop reason: HOSPADM

## 2023-12-08 RX ORDER — LOSARTAN POTASSIUM 50 MG/1
50 TABLET ORAL DAILY
Status: DISCONTINUED | OUTPATIENT
Start: 2023-12-08 | End: 2023-12-13

## 2023-12-08 RX ORDER — SODIUM CHLORIDE 9 MG/ML
INJECTION, SOLUTION INTRAVENOUS CONTINUOUS
Status: DISCONTINUED | OUTPATIENT
Start: 2023-12-08 | End: 2023-12-08 | Stop reason: HOSPADM

## 2023-12-08 RX ORDER — METFORMIN HCL 500 MG
500 TABLET, EXTENDED RELEASE 24 HR ORAL 2 TIMES DAILY WITH MEALS
Status: DISCONTINUED | OUTPATIENT
Start: 2023-12-08 | End: 2023-12-10

## 2023-12-08 RX ORDER — FLUMAZENIL 0.1 MG/ML
0.2 INJECTION, SOLUTION INTRAVENOUS
Status: ACTIVE | OUTPATIENT
Start: 2023-12-08 | End: 2023-12-08

## 2023-12-08 RX ORDER — FENTANYL CITRATE 50 UG/ML
INJECTION, SOLUTION INTRAMUSCULAR; INTRAVENOUS
Status: DISCONTINUED | OUTPATIENT
Start: 2023-12-08 | End: 2023-12-08 | Stop reason: HOSPADM

## 2023-12-08 RX ORDER — DEXTROSE MONOHYDRATE 25 G/50ML
25-50 INJECTION, SOLUTION INTRAVENOUS
Status: DISCONTINUED | OUTPATIENT
Start: 2023-12-08 | End: 2023-12-08

## 2023-12-08 RX ORDER — ASPIRIN 81 MG/1
243 TABLET, CHEWABLE ORAL ONCE
Status: COMPLETED | OUTPATIENT
Start: 2023-12-08 | End: 2023-12-08

## 2023-12-08 RX ORDER — OXYCODONE HYDROCHLORIDE 5 MG/1
10 TABLET ORAL EVERY 4 HOURS PRN
Status: DISCONTINUED | OUTPATIENT
Start: 2023-12-08 | End: 2023-12-10

## 2023-12-08 RX ORDER — DEXTROSE MONOHYDRATE 25 G/50ML
25-50 INJECTION, SOLUTION INTRAVENOUS
Status: DISCONTINUED | OUTPATIENT
Start: 2023-12-08 | End: 2023-12-19 | Stop reason: HOSPADM

## 2023-12-08 RX ORDER — OXYCODONE HYDROCHLORIDE 5 MG/1
5 TABLET ORAL EVERY 4 HOURS PRN
Status: DISCONTINUED | OUTPATIENT
Start: 2023-12-08 | End: 2023-12-10

## 2023-12-08 RX ORDER — FENTANYL CITRATE 50 UG/ML
25 INJECTION, SOLUTION INTRAMUSCULAR; INTRAVENOUS
Status: DISCONTINUED | OUTPATIENT
Start: 2023-12-08 | End: 2023-12-09

## 2023-12-08 RX ORDER — IODIXANOL 320 MG/ML
INJECTION, SOLUTION INTRAVASCULAR
Status: DISCONTINUED | OUTPATIENT
Start: 2023-12-08 | End: 2023-12-08 | Stop reason: HOSPADM

## 2023-12-08 RX ORDER — HEPARIN SODIUM 1000 [USP'U]/ML
INJECTION, SOLUTION INTRAVENOUS; SUBCUTANEOUS
Status: DISCONTINUED | OUTPATIENT
Start: 2023-12-08 | End: 2023-12-08 | Stop reason: HOSPADM

## 2023-12-08 RX ORDER — NICOTINE POLACRILEX 4 MG
15-30 LOZENGE BUCCAL
Status: DISCONTINUED | OUTPATIENT
Start: 2023-12-08 | End: 2023-12-08

## 2023-12-08 RX ORDER — ACETAMINOPHEN 325 MG/1
650 TABLET ORAL EVERY 4 HOURS PRN
Status: DISCONTINUED | OUTPATIENT
Start: 2023-12-08 | End: 2023-12-19 | Stop reason: HOSPADM

## 2023-12-08 RX ORDER — LIDOCAINE 40 MG/G
CREAM TOPICAL
Status: DISCONTINUED | OUTPATIENT
Start: 2023-12-08 | End: 2023-12-08 | Stop reason: HOSPADM

## 2023-12-08 RX ORDER — NICOTINE 21 MG/24HR
1 PATCH, TRANSDERMAL 24 HOURS TRANSDERMAL DAILY
Status: DISCONTINUED | OUTPATIENT
Start: 2023-12-08 | End: 2023-12-19 | Stop reason: HOSPADM

## 2023-12-08 RX ORDER — ASPIRIN 325 MG
325 TABLET ORAL ONCE
Status: COMPLETED | OUTPATIENT
Start: 2023-12-08 | End: 2023-12-08

## 2023-12-08 RX ORDER — FUROSEMIDE 10 MG/ML
40 INJECTION INTRAMUSCULAR; INTRAVENOUS EVERY 6 HOURS
Status: DISCONTINUED | OUTPATIENT
Start: 2023-12-08 | End: 2023-12-09

## 2023-12-08 RX ORDER — NICOTINE POLACRILEX 4 MG
15-30 LOZENGE BUCCAL
Status: DISCONTINUED | OUTPATIENT
Start: 2023-12-08 | End: 2023-12-19 | Stop reason: HOSPADM

## 2023-12-08 RX ADMIN — LOSARTAN POTASSIUM 50 MG: 50 TABLET, FILM COATED ORAL at 19:45

## 2023-12-08 RX ADMIN — FUROSEMIDE 40 MG: 10 INJECTION, SOLUTION INTRAMUSCULAR; INTRAVENOUS at 20:49

## 2023-12-08 RX ADMIN — NICOTINE 1 PATCH: 21 PATCH, EXTENDED RELEASE TRANSDERMAL at 17:27

## 2023-12-08 RX ADMIN — MAGNESIUM SULFATE HEPTAHYDRATE 2 G: 40 INJECTION, SOLUTION INTRAVENOUS at 22:21

## 2023-12-08 RX ADMIN — SODIUM CHLORIDE: 9 INJECTION, SOLUTION INTRAVENOUS at 07:55

## 2023-12-08 ASSESSMENT — ACTIVITIES OF DAILY LIVING (ADL)
ADLS_ACUITY_SCORE: 37

## 2023-12-08 NOTE — PHARMACY-ADMISSION MEDICATION HISTORY
Pharmacist Admission Medication History    Admission medication history is complete. The information provided in this note is only as accurate as the sources available at the time of the update.    Information Source(s): Patient and CareEverywhere/SureScripts via in-person    Pertinent Information:   -Has not started metformin yet (new med filled November). Pt reports wanting to wait and not start multiple new meds at the same time. Intends to start.     Changes made to PTA medication list:  Added: None  Deleted: None  Changed: None    Medication Affordability:  Not including over the counter (OTC) medications, was there a time in the past 3 months when you did not take your medications as prescribed because of cost?: No    Allergies reviewed with patient and updates made in EHR: yes    Medication History Completed By: Alee Hamm [facility] 12/8/2023 5:08 PM    PTA Med List   Medication Sig Note Last Dose    aspirin 81 MG EC tablet Take 1 tablet (81 mg) by mouth daily  12/8/2023    buPROPion (WELLBUTRIN XL) 150 MG 24 hr tablet Take 1 tablet (150 mg) by mouth every morning  Past Week    losartan (COZAAR) 50 MG tablet Take 1 tablet (50 mg) by mouth daily  12/6/2023    metFORMIN (GLUCOPHAGE XR) 500 MG 24 hr tablet Take 1 tablet (500 mg) by mouth 2 times daily (with meals) for 90 days 12/8/2023: Hasn't started yet     torsemide (DEMADEX) 20 MG tablet Take 2 tablets (40 mg) once daily on 12/9, 12/10, 12/11, and 12/12.  Get your labs drawn as ordered on either 12/11 or 12/12.  You will then be contacted with recommendations for what dose to take after this.  If you do not hear from our clinic by 12/13, you should plan on reducing your dose down to 1 tablet (20 mg) daily on 12/13.      [DISCONTINUED] furosemide (LASIX) 20 MG tablet Take 1 tablet (20 mg) by mouth daily

## 2023-12-08 NOTE — PRE-PROCEDURE
GENERAL PRE-PROCEDURE:   Procedure:  Cor angio with possible intervention, RHC, LHC  Date/Time:  12/8/2023 9:25 AM    Written consent obtained?: Yes    Risks and benefits: Risks, benefits and alternatives were discussed    Consent given by:  Patient  Patient states understanding of procedure being performed: Yes    Patient's understanding of procedure matches consent: Yes    Procedure consent matches procedure scheduled: Yes    Expected level of sedation:  Moderate  Appropriately NPO:  Yes  ASA Class:  3 (TONG, obesity, DM, HTN, ongoing tobacco abuse)  Mallampati  :  Grade 2- soft palate, base of uvula, tonsillar pillars, and portion of posterior pharyngeal wall visible  Lungs:  Lungs clear with good breath sounds bilaterally  Heart:  Normal heart sounds and rate  History & Physical reviewed:  History and physical reviewed and updates made (see comment)  H&P Comments:  Clinically Significant Risk Factors Present on Admission    Cardiovascular: Cardiomyopathy, HFrEF, HTN    Fluid & Electrolyte Disorders : Not present on admission    Gastroenterology : Not present on admission    Hematology/Oncology : Not present on admission    Nephrology : Not present on admission    Neurology : Not present on admission    Pulmonology : TONG, hypoxia of unknown origin    Systemic: obesity with BMI 59    [unfilled]    Statement of review:  I have reviewed the lab findings, diagnostic data, medications, and the plan for sedation

## 2023-12-08 NOTE — PROGRESS NOTES
Unable to report findings to Dr. Valiente or Vee SOLARES as both have left for the day. Reported findings with Dr Jimenez and VIRA Quan and both feel that Pt does need to stay in the hospital. Pt does have reservations but per Dr Jimenez Pt will need to sign out AMA if she leaves.

## 2023-12-08 NOTE — PROGRESS NOTES
Dr Valiente at bedside. Dr Valiente removed Pts oxygen and her saturation dropped from 94% to 81% in about five minutes. Dr Valiente ok with this finding as Pt has been having oxygen levels at 75% to 85% at baseline at home. Pt to discharge home with plan to take oral meds to diurese.

## 2023-12-08 NOTE — PROGRESS NOTES
Pt returned from cath lab on 4lpm nasal cannula. Cath lab RN reported that Pt [place] [facility] [place] levels in the mid 80's  and needed to be placed on nasal cannula.

## 2023-12-08 NOTE — DISCHARGE INSTRUCTIONS
Stop taking furosemide and start taking torsemide 40 mg (two 20 mg tablets) once daily on 12/9, 12/10, 12/11, and 12/12.      On either 12/11 or 12/12, come in for blood work to check on your kidney function.  Depending on how these tests look, we will contact you and instruct you what dose of torsemide to take going forward, but if you don't hear from our office by 12/13, plan to reduce your dose to 20 mg (1 tablet) daily on 12/13.      Interventional Cardiology  Coronary Angiogram/Angioplasty/Stent/Atherectomy Discharge  Instructions -   Radial (wrist) Approach     The instructions below are to help you understand how to take care of yourself. There is also information about when to call the doctor or emergency services.    **Do not stop your aspirin or platelet inhibitor unless directed by your Cardiologist.  These medications help to prevent platelets in your blood from sticking together and forming a clot.   Examples of these medications are: Ticagrelor (Brilinta), Clopidogrel (Plavix), Prasugrel (Effient)    For 24 hours after procedure:  Do not subject hand/arm to any forceful movements for 24 hours, such as supporting weight when rising from a chair or bed.  Do not drive a car for 24 hours.  The dressing on the puncture site may be removed after 24 hours and left open to air. If minor oozing, you may apply a Band-Aid and remove after 12 hours.   You may shower on the day after your procedure. Do not take a tub bath or wash dishes (no soaking wrist) with the puncture site in water for 3 days after the procedure.    For 48 hours following the procedure:  Do not operate a lawnmower, motorcycle, chainsaw or all-terrain vehicle.  Do not lift anything heavier than 5-10 pounds with affected arm for 5 days.  Avoid excessive bending (flexion/extension) wrist movement.  Do not engage in vigorous exercise (i.e. tennis, golf) using the affected arm for 5 days after discharge.  You may return to work in 72 hours if no  complications and no heavy lifting.    If bleeding should occur following discharge:  Sit down and apply firm pressure with your thumb against the puncture site and fingers against back of wrist for 10 minutes.  If the bleeding stops, continue to rest, keeping your wrist still for 2 hours. Notify your doctor as soon as possible.  If bleeding does not stop after 10 minutes or if there is a large amount of bleeding or spurting, call 911 immediately. Do not drive yourself to the hospital.           Contact the Heart Clinic at [phone] if you develop:  Fever over 100.4, that lasts more than one day  Redness, heat, or pus at the puncture site  Change in color or temperature of the hand or arm    Expect mild tingling of hand and tenderness at the puncture site for up to 3 days. You may take Tylenol or a pain medicine recommended by your doctor.                       Our Cardiac Rehab staff may visit briefly with you while your in the hospital.   If they miss you, someone will contact you after you are home.  You are encouraged to enroll in an Outpatient Cardiac Rehab Program     No elective dental work for 6 weeks after having a stent    Your Procedural Physician was: Dr. Abhi Valiente  the phone number is: [phone]    [facility]:  [phone]  If you are calling after hours, please listen to the entire voicemail, a live  will answer at the end of the message

## 2023-12-08 NOTE — CONSULTS
[facility]  Consult Note - Hospitalist Service  Date of Admission:  12/8/2023  Consult Requested by: Cardiology  Reason for Consult: Shortness of breath    Assessment & Plan   Sammy Laws is a 53 year old female admitted on 12/8/2023. She has had increased shortness of breath for the last few months.  Patient states she feels short of breath all the time worse with activity and when laying flat. Patient underwent a cardiac cath today with no intervention done.  Severe pulmonary hypertension seen and severe elevated pulmonary wedge: 45 mmHg.  After procedure patient O2 sats 60% on room air.  Patient does not have a history of needing oxygen at home. O2 sats on 4L 90%.  Upon exam patient very somnolent, but able to arouse, and then would quickly fall back asleep.  Portable stat chest x-ray obtained and arterial blood gas with concern for hypercapnia. Lasix IV tonight for diuresis and BiPAP tonight to improve hypercapnia.    # Acute on chronic hypercapnia and hypoxia respiratory failure  Venous blood gas obtained post Cath Lab procedure: PH 7.25 and CO2 83  Arterial blood gas obtained: PH 7.31, CO2 71, OXY HGB 69.6  BiPAP initiated 12/6  Repeat ABG in 2 hours and in a.m.    #SOB (shortness of breath)  Multifactorial will need diuresis  Portable chest x-ray: pulmonary edema  Patient sats 60% on room air after cath lab procedure, sats 90% on 4 L  Consider new need for home O2     #Congestive heart failure, unspecified HF chronicity, unspecified heart failure type  Lasix; patient states that she stopped taking approximately 2 months ago because it made her go to the bathroom frequently  Lasix IV given after Cath Lab unclear if any urine output after this was given  BNP       Echo in AM  Torsemide at home per cardiology  I/Os  TSH checked for completeness    #Tobacco abuse  Nicotine patch         Patient states she would like help quitting    #Diabetes mellitus, type 2   Glucophage Held Post  "Cath Lab Procedure  Sliding scale every 4 while n.p.o.  Nursing diabetic orders           #Hypertension   Losartan  Patient's not controlled because she is volume up anticipate improvement with IV diuresis    #Depression  Wellbutrin     The patient's care was discussed with the Attending Physician, Dr. Landry .    Clinically Significant Risk Factors Present on Admission           # Hypercalcemia: Highest Ca = 10.6 mg/dL in last 2 days, will monitor as appropriate      # Drug Induced Platelet Defect: home medication list includes an antiplatelet medication   # Hypertension: Noted on problem list   # Acute Respiratory Failure: Documented O2 saturation < 91%.  Continue supplemental oxygen as needed    # DMII: A1C = 7.2 % (Ref range: 0.0 - 5.6 %) within past 6 months    # Severe Obesity: Estimated body mass index is 55.89 kg/m  as calculated from the following:    Height as of this encounter: 1.6 m (5' 3\").    Weight as of this encounter: 143.1 kg (315 lb 8 oz).              Ankita Pineda NP  Hospitalist Service  Securely message with Vocera (more info)  Text page via [facility] Paging/Directory   ______________________________________________________________________    Chief Complaint   Shortness of breath    History is obtained from the patient    History of Present Illness   Sammy Laws is a 53 year old female who She has had increased shortness of breath for the last few months.  Patient states she feels short of breath all the time worse with activity and when laying flat.  Patient states that she has to sleep in a recliner and cannot sleep in her bed due to shortness of breath. Patient admits to stop taking her Lasix because she did not like how it made her urinate frequently and does think that might have contributed to her increasing shortness of breath.  Patient denies chest pain and palpitations.  Patient denies nausea, vomiting or diarrhea.  Patient denies weakness or falls.  Patient denies cough,fever, or " sick contacts. Upon exam patient very somnolent, but able to arouse, and then would quickly fall back asleep.  Patient admits to feeling tired.  Denies confusion, weakness, or dizziness.  Patient admits to smoking cigarettes and would like help quitting.       Past Medical History    History reviewed. No pertinent past medical history.    Past Surgical History   History reviewed. No pertinent surgical history.    Medications   I have reviewed this patient's current medications  Medications Prior to Admission   Medication Sig Dispense Refill Last Dose    aspirin 81 MG EC tablet Take 1 tablet (81 mg) by mouth daily 100 tablet 4 12/8/2023    buPROPion (WELLBUTRIN XL) 150 MG 24 hr tablet Take 1 tablet (150 mg) by mouth every morning 30 tablet 0 Past Week    losartan (COZAAR) 50 MG tablet Take 1 tablet (50 mg) by mouth daily 90 tablet 3 12/6/2023    metFORMIN (GLUCOPHAGE XR) 500 MG 24 hr tablet Take 1 tablet (500 mg) by mouth 2 times daily (with meals) for 90 days 180 tablet 0     [DISCONTINUED] furosemide (LASIX) 20 MG tablet Take 1 tablet (20 mg) by mouth daily 90 tablet 3           Review of Systems    The 10 point Review of Systems is negative other than noted in the HPI or here.      Physical Exam   Vital Signs: Temp: 99.1  F (37.3  C) Temp src: Oral BP: (!) 160/91 Pulse: 110   Resp: 20 SpO2: 91 % O2 Device: Nasal cannula Oxygen Delivery: 4 LPM  Weight: 315 lbs 8 oz    Constitutional: awake, alert, cooperative, no apparent distress, and appears stated age  Eyes: Lids and lashes normal, pupils equal, round and reactive to light, extra ocular muscles intact, sclera clear, conjunctiva normal  ENT: Normocephalic, without obvious abnormality, atraumatic, sinuses nontender on palpation, external ears without lesions, oral pharynx with moist mucous membranes, tonsils without erythema or exudates, gums normal and good dentition.  Hematologic / Lymphatic: no cervical lymphadenopathy  Respiratory: No increased work of  "breathing, good air exchange, diminished BS:clear to auscultation bilaterally, no crackles or wheezing  Cardiovascular: Normal apical impulse, regular rate and rhythm, normal S1 and S2, no S3 or S4, and no murmur noted  GI: No scars, normal bowel sounds, soft, non-distended, non-tender, no masses palpated, no hepatosplenomegally  Skin: normal skin color, texture, turgor  Musculoskeletal: There is no redness, warmth, or swelling of the joints.  Full range of motion noted.  Motor strength is 5 out of 5 all extremities bilaterally.  Tone is normal.  Neurologic: Awake, alert, oriented to name, place and time.  Cranial nerves II-XII are grossly intact.  Motor is 5 out of 5 bilaterally.  Patient is somnolent:able to arouse   Neuropsychiatric: General: normal, calm, and normal eye contact    Medical Decision Making       75 MINUTES SPENT BY ME on the date of service doing chart review, history, exam, documentation & further activities per the note.      Data     I have personally reviewed the following data over the past 24 hrs:    8.0  \   18.4 (H)   / 159     139 99 8.4 /  130 (H)   4.4 31 (H) 0.49 (L) \       Imaging results reviewed over the past 24 hrs:   Recent Results (from the past 24 hour(s))   Cardiac Catheterization    Narrative    Multivessel CAD as described below:  LM: Normal  LAD: Diffuse mild disease with a focal moderate lesion in the distal LAD.    DFR is positive (0.84) beyond the distal LAD lesion, but DFR pullback   shows gradual change in DFR, with no focal \"step-up\" to suggest a benefit   of PCI.  LCx: Codominant vessel with severe stenosis downstream in the L PDA.  This   is a relatively small vessel at this point (roughly 2 mm in diameter).  RCA: Codominant vessel with chronic subtotal occlusion in the mid vessel,   with ALVINA I flow distally.  Severe WHO II (postcapillary) pulmonary hypertension.  Severely elevated pulmonary capillary wedge pressure (~45 mmHg).  Normal cardiac index by " Tiana.      Would recommend focus currently on diuresis/afterload reduction to improve   pulmonary hypertension and LV filling pressures.  We will give 40 mg of IV   Lasix today, then change her home diuretic from furosemide 20 mg oral   daily to torsemide 40 mg oral daily, with plan for repeat labs in roughly   3 days and dose adjustment as needed based on this.    Following improvement of her hemodynamics/volume status, could consider   return to the Cath Lab for PCI of the LPDA and mid RCA versus medical   management.     XR Chest Port 1 View    Narrative    EXAM: XR CHEST PORT 1 VIEW  LOCATION: [facility]  DATE: 12/8/2023    INDICATION: Shortness of breath.  COMPARISON: 11/01/2023      Impression    IMPRESSION:     Diffuse interstitial prominence with hazy perihilar and lower lung opacities, likely mild interstitial and alveolar pulmonary edema.    No pleural effusion or pneumothorax.    Stable cardiomegaly.

## 2023-12-08 NOTE — PROGRESS NOTES
Pt had removed nasal cannula and was found to have an oxygen level at 68% on room air with a good wave form. Writer rechecked several times and got similar readings. Pt placed back on oxygen and Writer reported finding to Dr. Valiente and Maia SOLARES.

## 2023-12-08 NOTE — PROGRESS NOTES
Walked Pt on room air while monitoring oxygen saturation. While walking Pt's saturation was at 65% with a HR at 120.

## 2023-12-08 NOTE — INTERVAL H&P NOTE
"I have reviewed the surgical (or preoperative) H&P that is linked to this encounter, and examined the patient. There are no significant changes    Labs reviewed and are ok.  LDL 93, not on statin    Clinical Conditions Present on Arrival:  Clinically Significant Risk Factors Present on Admission     # Hyperkalemia: Highest K = 5.4 mmol/L in last 30 days, will monitor as appropriate   # Hypercalcemia: Highest Ca = 10.6 mg/dL in last 2 days, will monitor as appropriate        # Drug Induced Platelet Defect: home medication list includes an antiplatelet medication  # DMII: A1C = 7.2 % (Ref range: 0.0 - 5.6 %) within past 6 months  # Severe Obesity: Estimated body mass index is 59.17 kg/m  as calculated from the following:    Height as of this encounter: 1.6 m (5' 3\").    Weight as of this encounter: 151.5 kg (334 lb).           "

## 2023-12-09 ENCOUNTER — APPOINTMENT (OUTPATIENT)
Dept: CARDIOLOGY | Facility: HOSPITAL | Age: 53
DRG: 286 | End: 2023-12-09
Attending: INTERNAL MEDICINE
Payer: COMMERCIAL

## 2023-12-09 ENCOUNTER — APPOINTMENT (OUTPATIENT)
Dept: RADIOLOGY | Facility: HOSPITAL | Age: 53
DRG: 286 | End: 2023-12-09
Attending: INTERNAL MEDICINE
Payer: COMMERCIAL

## 2023-12-09 ENCOUNTER — ANCILLARY PROCEDURE (OUTPATIENT)
Dept: ULTRASOUND IMAGING | Facility: HOSPITAL | Age: 53
DRG: 286 | End: 2023-12-09
Attending: INTERNAL MEDICINE
Payer: COMMERCIAL

## 2023-12-09 PROBLEM — L40.4 GUTTATE PSORIASIS: Status: ACTIVE | Noted: 2023-12-09

## 2023-12-09 LAB
ALLEN'S TEST: YES
ANION GAP SERPL CALCULATED.3IONS-SCNC: 12 MMOL/L (ref 7–15)
ANION GAP SERPL CALCULATED.3IONS-SCNC: 4 MMOL/L (ref 7–15)
ANION GAP SERPL CALCULATED.3IONS-SCNC: 5 MMOL/L (ref 7–15)
ATRIAL RATE - MUSE: 105 BPM
BASE EXCESS BLDA CALC-SCNC: 13.1 MMOL/L
BASE EXCESS BLDV CALC-SCNC: 15.8 MMOL/L
BASE EXCESS BLDV CALC-SCNC: 16.2 MMOL/L
BASE EXCESS BLDV CALC-SCNC: 17.9 MMOL/L
BUN SERPL-MCNC: 7.4 MG/DL (ref 6–20)
BUN SERPL-MCNC: 8.6 MG/DL (ref 6–20)
BUN SERPL-MCNC: 8.8 MG/DL (ref 6–20)
CALCIUM SERPL-MCNC: 10.4 MG/DL (ref 8.6–10)
CALCIUM SERPL-MCNC: 10.4 MG/DL (ref 8.6–10)
CALCIUM SERPL-MCNC: 10.6 MG/DL (ref 8.6–10)
CALCIUM, IONIZED MEASURED: 1.35 MMOL/L (ref 1.11–1.3)
CHLORIDE SERPL-SCNC: 91 MMOL/L (ref 98–107)
CHLORIDE SERPL-SCNC: 93 MMOL/L (ref 98–107)
CHLORIDE SERPL-SCNC: 94 MMOL/L (ref 98–107)
COHGB MFR BLD: 96.4 % (ref 96–97)
CREAT SERPL-MCNC: 0.49 MG/DL (ref 0.51–0.95)
CREAT SERPL-MCNC: 0.53 MG/DL (ref 0.51–0.95)
CREAT SERPL-MCNC: 0.54 MG/DL (ref 0.51–0.95)
DEPRECATED HCO3 PLAS-SCNC: 30 MMOL/L (ref 22–29)
DEPRECATED HCO3 PLAS-SCNC: 39 MMOL/L (ref 22–29)
DEPRECATED HCO3 PLAS-SCNC: 40 MMOL/L (ref 22–29)
DIASTOLIC BLOOD PRESSURE - MUSE: NORMAL MMHG
EGFRCR SERPLBLD CKD-EPI 2021: >90 ML/MIN/1.73M2
GLUCOSE BLDC GLUCOMTR-MCNC: 112 MG/DL (ref 70–99)
GLUCOSE BLDC GLUCOMTR-MCNC: 119 MG/DL (ref 70–99)
GLUCOSE BLDC GLUCOMTR-MCNC: 127 MG/DL (ref 70–99)
GLUCOSE BLDC GLUCOMTR-MCNC: 130 MG/DL (ref 70–99)
GLUCOSE BLDC GLUCOMTR-MCNC: 130 MG/DL (ref 70–99)
GLUCOSE BLDC GLUCOMTR-MCNC: 156 MG/DL (ref 70–99)
GLUCOSE BLDC GLUCOMTR-MCNC: 98 MG/DL (ref 70–99)
GLUCOSE SERPL-MCNC: 87 MG/DL (ref 70–99)
GLUCOSE SERPL-MCNC: 93 MG/DL (ref 70–99)
GLUCOSE SERPL-MCNC: 99 MG/DL (ref 70–99)
HCO3 BLD-SCNC: 41 MMOL/L (ref 23–29)
HCO3 BLDV-SCNC: 43 MMOL/L (ref 24–30)
HCO3 BLDV-SCNC: 43 MMOL/L (ref 24–30)
HCO3 BLDV-SCNC: 45 MMOL/L (ref 24–30)
HOLD SPECIMEN: NORMAL
INTERPRETATION ECG - MUSE: NORMAL
ION CA PH 7.4: 1.27 MMOL/L (ref 1.11–1.3)
LVEF ECHO: NORMAL
MAGNESIUM SERPL-MCNC: 1.8 MG/DL (ref 1.7–2.3)
MAGNESIUM SERPL-MCNC: 2 MG/DL (ref 1.7–2.3)
O2/TOTAL GAS SETTING VFR VENT: 65 %
OXYHGB MFR BLD: 92.2 % (ref 96–97)
OXYHGB MFR BLDV: 77.7 % (ref 70–75)
OXYHGB MFR BLDV: 78.9 % (ref 70–75)
OXYHGB MFR BLDV: 83.7 % (ref 70–75)
P AXIS - MUSE: 51 DEGREES
PCO2 BLD: >98 MM HG (ref 35–45)
PCO2 BLDV: 92 MM HG (ref 35–50)
PCO2 BLDV: 94 MM HG (ref 35–50)
PCO2 BLDV: 95 MM HG (ref 35–50)
PEEP: 6 CM H2O
PH BLD: 7.21 [PH] (ref 7.37–7.44)
PH BLDV: 7.27 [PH] (ref 7.35–7.45)
PH BLDV: 7.28 [PH] (ref 7.35–7.45)
PH BLDV: 7.29 [PH] (ref 7.35–7.45)
PH: 7.28 (ref 7.35–7.45)
PO2 BLD: 90 MM HG (ref 80–90)
PO2 BLDV: 47 MM HG (ref 25–47)
PO2 BLDV: 49 MM HG (ref 25–47)
PO2 BLDV: 56 MM HG (ref 25–47)
POTASSIUM SERPL-SCNC: 4.1 MMOL/L (ref 3.4–5.3)
POTASSIUM SERPL-SCNC: 4.1 MMOL/L (ref 3.4–5.3)
POTASSIUM SERPL-SCNC: 4.3 MMOL/L (ref 3.4–5.3)
POTASSIUM SERPL-SCNC: 5.1 MMOL/L (ref 3.4–5.3)
PR INTERVAL - MUSE: 164 MS
PSV (LAB BLOOD GAS): 10 CM H2O
QRS DURATION - MUSE: 82 MS
QT - MUSE: 342 MS
QTC - MUSE: 452 MS
R AXIS - MUSE: 6 DEGREES
RATE: 16 RR/MIN
SAO2 % BLDV: 80.5 % (ref 70–75)
SAO2 % BLDV: 81.6 % (ref 70–75)
SAO2 % BLDV: 86.9 % (ref 70–75)
SODIUM SERPL-SCNC: 135 MMOL/L (ref 135–145)
SODIUM SERPL-SCNC: 136 MMOL/L (ref 135–145)
SODIUM SERPL-SCNC: 137 MMOL/L (ref 135–145)
SYSTOLIC BLOOD PRESSURE - MUSE: NORMAL MMHG
T AXIS - MUSE: 36 DEGREES
TEMPERATURE: 37 DEGREES C
VENTILATION MODE: ABNORMAL
VENTRICULAR RATE- MUSE: 105 BPM

## 2023-12-09 PROCEDURE — 250N000009 HC RX 250: Performed by: INTERNAL MEDICINE

## 2023-12-09 PROCEDURE — 94640 AIRWAY INHALATION TREATMENT: CPT | Mod: 76

## 2023-12-09 PROCEDURE — 272N000054 HC CANNULA HIGH FLOW, ADULT

## 2023-12-09 PROCEDURE — C8924 2D TTE W OR W/O FOL W/CON,FU: HCPCS

## 2023-12-09 PROCEDURE — 999N000009 HC STATISTIC AIRWAY CARE

## 2023-12-09 PROCEDURE — 93308 TTE F-UP OR LMTD: CPT | Mod: 26 | Performed by: INTERNAL MEDICINE

## 2023-12-09 PROCEDURE — 36569 INSJ PICC 5 YR+ W/O IMAGING: CPT

## 2023-12-09 PROCEDURE — 99292 CRITICAL CARE ADDL 30 MIN: CPT | Performed by: INTERNAL MEDICINE

## 2023-12-09 PROCEDURE — 99222 1ST HOSP IP/OBS MODERATE 55: CPT | Mod: 25 | Performed by: INTERNAL MEDICINE

## 2023-12-09 PROCEDURE — 82805 BLOOD GASES W/O2 SATURATION: CPT | Performed by: INTERNAL MEDICINE

## 2023-12-09 PROCEDURE — 36600 WITHDRAWAL OF ARTERIAL BLOOD: CPT

## 2023-12-09 PROCEDURE — 94660 CPAP INITIATION&MGMT: CPT

## 2023-12-09 PROCEDURE — 93321 DOPPLER ECHO F-UP/LMTD STD: CPT | Mod: 26 | Performed by: INTERNAL MEDICINE

## 2023-12-09 PROCEDURE — 99291 CRITICAL CARE FIRST HOUR: CPT | Performed by: INTERNAL MEDICINE

## 2023-12-09 PROCEDURE — 80048 BASIC METABOLIC PNL TOTAL CA: CPT | Performed by: INTERNAL MEDICINE

## 2023-12-09 PROCEDURE — 94640 AIRWAY INHALATION TREATMENT: CPT

## 2023-12-09 PROCEDURE — 999N000044 HC STATISTIC CVC DRESSING CHANGE

## 2023-12-09 PROCEDURE — 82330 ASSAY OF CALCIUM: CPT | Performed by: INTERNAL MEDICINE

## 2023-12-09 PROCEDURE — 83735 ASSAY OF MAGNESIUM: CPT | Performed by: INTERNAL MEDICINE

## 2023-12-09 PROCEDURE — 255N000002 HC RX 255 OP 636: Performed by: INTERNAL MEDICINE

## 2023-12-09 PROCEDURE — 999N000157 HC STATISTIC RCP TIME EA 10 MIN

## 2023-12-09 PROCEDURE — 99207 PR APP CREDIT; MD BILLING SHARED VISIT: CPT | Performed by: INTERNAL MEDICINE

## 2023-12-09 PROCEDURE — 93010 ELECTROCARDIOGRAM REPORT: CPT | Performed by: INTERNAL MEDICINE

## 2023-12-09 PROCEDURE — 93005 ELECTROCARDIOGRAM TRACING: CPT

## 2023-12-09 PROCEDURE — 200N000001 HC R&B ICU

## 2023-12-09 PROCEDURE — 999N000185 HC STATISTIC TRANSPORT TIME EA 15 MIN

## 2023-12-09 PROCEDURE — 250N000011 HC RX IP 250 OP 636: Mod: JZ | Performed by: INTERNAL MEDICINE

## 2023-12-09 PROCEDURE — C9113 INJ PANTOPRAZOLE SODIUM, VIA: HCPCS | Performed by: INTERNAL MEDICINE

## 2023-12-09 PROCEDURE — 71045 X-RAY EXAM CHEST 1 VIEW: CPT

## 2023-12-09 PROCEDURE — 272N000452 HC KIT SHRLOCK 5FR POWER PICC TRIPLE LUMEN

## 2023-12-09 PROCEDURE — 36415 COLL VENOUS BLD VENIPUNCTURE: CPT | Performed by: INTERNAL MEDICINE

## 2023-12-09 PROCEDURE — 93325 DOPPLER ECHO COLOR FLOW MAPG: CPT | Mod: 26 | Performed by: INTERNAL MEDICINE

## 2023-12-09 RX ORDER — ALBUTEROL SULFATE 0.83 MG/ML
2.5 SOLUTION RESPIRATORY (INHALATION) EVERY 4 HOURS PRN
Status: DISCONTINUED | OUTPATIENT
Start: 2023-12-09 | End: 2023-12-09

## 2023-12-09 RX ORDER — NALOXONE HYDROCHLORIDE 0.4 MG/ML
0.2 INJECTION, SOLUTION INTRAMUSCULAR; INTRAVENOUS; SUBCUTANEOUS
Status: DISCONTINUED | OUTPATIENT
Start: 2023-12-09 | End: 2023-12-19 | Stop reason: HOSPADM

## 2023-12-09 RX ORDER — NALOXONE HYDROCHLORIDE 0.4 MG/ML
0.4 INJECTION, SOLUTION INTRAMUSCULAR; INTRAVENOUS; SUBCUTANEOUS
Status: DISCONTINUED | OUTPATIENT
Start: 2023-12-09 | End: 2023-12-19 | Stop reason: HOSPADM

## 2023-12-09 RX ORDER — CHLOROTHIAZIDE SODIUM 500 MG/1
500 INJECTION INTRAVENOUS ONCE
Qty: 20 ML | Refills: 0 | Status: COMPLETED | OUTPATIENT
Start: 2023-12-09 | End: 2023-12-09

## 2023-12-09 RX ORDER — MAGNESIUM SULFATE HEPTAHYDRATE 40 MG/ML
2 INJECTION, SOLUTION INTRAVENOUS ONCE
Status: COMPLETED | OUTPATIENT
Start: 2023-12-09 | End: 2023-12-09

## 2023-12-09 RX ORDER — ALBUTEROL SULFATE 0.83 MG/ML
2.5 SOLUTION RESPIRATORY (INHALATION) EVERY 6 HOURS PRN
Status: DISCONTINUED | OUTPATIENT
Start: 2023-12-09 | End: 2023-12-10

## 2023-12-09 RX ORDER — IPRATROPIUM BROMIDE AND ALBUTEROL SULFATE 2.5; .5 MG/3ML; MG/3ML
3 SOLUTION RESPIRATORY (INHALATION)
Status: DISCONTINUED | OUTPATIENT
Start: 2023-12-09 | End: 2023-12-09

## 2023-12-09 RX ORDER — LIDOCAINE 40 MG/G
CREAM TOPICAL
Status: ACTIVE | OUTPATIENT
Start: 2023-12-09 | End: 2023-12-12

## 2023-12-09 RX ORDER — ENOXAPARIN SODIUM 100 MG/ML
40 INJECTION SUBCUTANEOUS EVERY 24 HOURS
Status: DISCONTINUED | OUTPATIENT
Start: 2023-12-09 | End: 2023-12-10

## 2023-12-09 RX ADMIN — LIDOCAINE HYDROCHLORIDE 2 ML: 10 INJECTION, SOLUTION EPIDURAL; INFILTRATION; INTRACAUDAL; PERINEURAL at 09:50

## 2023-12-09 RX ADMIN — MAGNESIUM SULFATE HEPTAHYDRATE 2 G: 40 INJECTION, SOLUTION INTRAVENOUS at 06:43

## 2023-12-09 RX ADMIN — MAGNESIUM SULFATE HEPTAHYDRATE 2 G: 40 INJECTION, SOLUTION INTRAVENOUS at 16:39

## 2023-12-09 RX ADMIN — IPRATROPIUM BROMIDE AND ALBUTEROL SULFATE 3 ML: .5; 3 SOLUTION RESPIRATORY (INHALATION) at 07:13

## 2023-12-09 RX ADMIN — PERFLUTREN 2.5 ML: 6.52 INJECTION, SUSPENSION INTRAVENOUS at 12:48

## 2023-12-09 RX ADMIN — CHLOROTHIAZIDE SODIUM 500 MG: 500 INJECTION, POWDER, LYOPHILIZED, FOR SOLUTION INTRAVENOUS at 14:13

## 2023-12-09 RX ADMIN — ENOXAPARIN SODIUM 40 MG: 40 INJECTION SUBCUTANEOUS at 09:50

## 2023-12-09 RX ADMIN — BUMETANIDE 0.5 MG/HR: 0.25 INJECTION INTRAMUSCULAR; INTRAVENOUS at 14:07

## 2023-12-09 RX ADMIN — ALBUTEROL SULFATE 2.5 MG: 2.5 SOLUTION RESPIRATORY (INHALATION) at 01:23

## 2023-12-09 RX ADMIN — FUROSEMIDE 40 MG: 10 INJECTION, SOLUTION INTRAMUSCULAR; INTRAVENOUS at 03:14

## 2023-12-09 RX ADMIN — PANTOPRAZOLE SODIUM 40 MG: 40 INJECTION, POWDER, FOR SOLUTION INTRAVENOUS at 09:50

## 2023-12-09 RX ADMIN — FUROSEMIDE 40 MG: 10 INJECTION, SOLUTION INTRAMUSCULAR; INTRAVENOUS at 09:19

## 2023-12-09 RX ADMIN — IPRATROPIUM BROMIDE AND ALBUTEROL SULFATE 3 ML: .5; 3 SOLUTION RESPIRATORY (INHALATION) at 11:16

## 2023-12-09 ASSESSMENT — ACTIVITIES OF DAILY LIVING (ADL)
ADLS_ACUITY_SCORE: 43
ADLS_ACUITY_SCORE: 37
ADLS_ACUITY_SCORE: 43

## 2023-12-09 NOTE — PROGRESS NOTES
Intensivist did see patient to assist with Bipap settings and recommend transfer to ICU tonight for closer watch and reposition with bipap  -will have pulm follow    I called her significant other John to update about moving her to ICU for closer monitoring --I did reach him now and updated him

## 2023-12-09 NOTE — PROGRESS NOTES
"Changed BiPAP mask to \"B\" under the nose mask, SpO2 92 %. FiO2 .60. Will continue to titrate FiO2 as tolerated. RT to monitor.    /57   Pulse 98   Temp 99.9  F (37.7  C) (Axillary)   Resp 29   Ht 1.6 m (5' 3\")   Wt 138.8 kg (306 lb)   LMP  (LMP Unknown)   SpO2 93%   BMI 54.21 kg/m          "

## 2023-12-09 NOTE — CONSULTS
CRITICAL CARE CONSULT:    Reason for Consult:  Acute on chronic respiratory failure with hypercapnia and hypoxia, pulmonary HTN, acute decompensated heart failure    Summary:   53 year old female active smoker with a history of severe obesity, DM2, primary HTN, chronic dyspnea and peripheral edema, presented on 12/8 for elective right and left heart catheterization, found to have severe pulmonary HTN, severely elevated PAOP, systolic and diastolic heart failure, now with worsening acute on chronic respiratory failure with hypercapnia and hypoxia requiring NIPPV.    Assessment & Plan:   Goals of Care:  Life prolonging without limits    Neurology, Psychiatry, Sedation, Analgesia:  Acute encephalopathy  Due to hypercapnia primarily. Acute on chronic so somewhat compensated, somnolent but able to arouse and vocalize/follow commands.  - correct acute on chronic respiratory acidosis and hypoxia with BiPAP, diuresis  - avoid opiates and benzos  - close monitoring for airway protection    Cardiovascular:  Acute decompensated heart failure  Severe pulmonary HTN  Severely elevated PAOP  Mixed systolic and diastolic heart failure  Likely WHO group 2/3 pulmonary HTN, with elective right heart cath with PAP 73/38 (52), PAOP severely elevated at 45-50, nonobstructive CAD, normal CI. Hypercapnia likely worsening fluid retention, high risk of JAMES/OHS. No PFTs or sleep study on record. Per significant other, John, she has had worse dyspnea over the last year, cannot walk more than a short distance without dyspnea. Likely baseline CO2 likely in the 70s. Worsening acute respiratory acidosis despite BiPAP. [place] smokes 1 ppd, significant other smokes as well. No PFTs on record.  - appreciate cardiology consultation  - change to AVAPS 420, rate 22, EPAP 10  - follow VBGs  - needs to quit smoking; nicotine patch, advised her significant other to quit also  - intubate if indicated  - diuresis; poor response to furosemide so changed to  bumetanide drip and one-time chlorothiazide, doing better with this  - needs outpatient PFTs  - try to qualify for home AVAPS on discharge  - will need pulmonary/sleep outpatient follow-up  - needs more regular primary care follow-up  - continue losartan if tolerated, monitor hemodynamics, renal function, electrolytes    Respiratory, Airway:  Acute on chronic hypercapnic and hypoxemic respiratory failure  Pulmonary hypertension  Tobacco dependence  Likely WHO group 2/3 pulmonary HTN, with elective right heart cath with PAP 73/38 (52), PAOP severely elevated at 45-50, nonobstructive CAD, normal CI. Hypercapnia likely worsening fluid retention, high risk of JAMES/OHS. No PFTs or sleep study on record. Per significant other, John, she has had worse dyspnea over the last year, cannot walk more than a short distance without dyspnea. Likely baseline CO2 in the 70s. Worsening acute respiratory acidosis despite BiPAP.  - change to AVAPS 420, rate 22, EPAP 10  - follow VBGs  - needs to quit smoking; nicotine patch, advised her significant other to quit also  - intubate if indicated  - diuresis  - needs outpatient PFTs, ideally sleep study  - try to qualify for home AVAPS on discharge  - will need pulmonary/sleep outpatient follow-up  - needs more regular primary care follow-up    Gastrointestinal:  No acute issues.  - monitor    Renal, Acid-base, Electrolytes, Volume:  Respiratory acidosis  Hypervolemia  See cath results above. Has persistent mild peripheral edema.  - diuresis  - AVAPS  - morales, monitor I/O, BUN, Cr, weight    Infectious Disease:  No acute issues.  - monitor    Hematology, Oncology:  Secondary polycythemia  Chronic hypoxia-induced.  - monitor    Endocrine:  DM  A1c 7.2  - FSBG with sliding scale aspart    Checklist:  FEN: NPO  VTE ppx: enoxaparin  GI ppx: PPI  Bowel regimen: NPO  VAP ppx: Head of bed >30 degrees while on AVAPS  Lines/tube-size:  morales placed on 12/9  PICC placed on 12/9  Skin: monitor  "  PT/OT/SLP, early mobility: when appropriate  Code Status: full  Communication: Communicated with the patient's significant other, John, by telephone.  Disposition: The patient is critically ill requiring noninvasive mechanical ventilation, at high risk of respiratory decompensation.    Restraints  Not indicated    John Roach MD  Pulmonary and Critical Care Medicine  [facility]  [email or web address]  Office [phone]  Pager [phone]  he/him    History of Present Illness:  53 year old female active smoker with a history of severe obesity, DM2, primary HTN, chronic dyspnea and peripheral edema, presented on 12/8 for elective right and left heart catheterization, found to have severe pulmonary HTN, severely elevated PAOP, systolic and diastolic heart failure, now with worsening acute on chronic respiratory failure with hypercapnia and hypoxia requiring NIPPV. Worsening hypercapnia despite BiPAP. Arousable, interactive but somnolent. Presented initially for elective right and left heart cath as discussed above, subsequently admitted, worsening hypercapnia and hypoxia on escalating NIPPV support.    Medical & Surgical History:  severe obesity, DM2, primary HTN, chronic dyspnea and peripheral edema, presented on 12/8 for elective right and left heart catheterization, found to have severe pulmonary HTN, severely elevated PAOP, systolic and diastolic heart failure       Family & Social History: Reviewed in the electronic medical record    Medications:   Reviewed in the electronic medical record.     Allergies:   Reviewed in the electronic medical record.     Review of Systems:   Unable to assess    Physical Exam:  Vent settings for last 24 hours:  FiO2 (%): 65 %  Resp: 23      /70 (BP Location: Right arm)   Pulse (!) 130   Temp 99.9  F (37.7  C) (Axillary)   Resp 23   Ht 1.6 m (5' 3\")   Wt 138.8 kg (306 lb)   LMP  (LMP Unknown)   SpO2 95%   BMI 54.21 kg/m      Intake/Output last 3 shifts:  I/O last 3 " "completed shifts:  In: 50 [I.V.:50]  Out: 2250 [Urine:2250]  Intake/Output this shift:  I/O this shift:  In: -   Out: 225 [Urine:225]    Physical Exam  Physical Exam:  Gen: somnolent but arousable, on BiPAP  HEENT: NT, no GENA  CV: tachy, regular, no m/g/r  Resp: diminished at bases  Abd: soft, nontender, BS+  Skin: no rashes or lesions  Ext: mild pitting edema bilateral legs  Neuro: PERRL, somnolent but arousable, able to respond to questions and follow commands nonfocal exam    IMAGING:  CXR (12/8/23):  - images directly reviewed, formal interpretation follows:  IMPRESSION:      Diffuse interstitial prominence with hazy perihilar and lower lung opacities, likely mild interstitial and alveolar pulmonary edema.     No pleural effusion or pneumothorax.     Stable cardiomegaly.    All pertinent vital signs, ventilator settings, I&Os, laboratory, microbiology, ECGs, & imaging data has been personally reviewed. Total Critical Care time, excluding procedures, was 1 Hour 30 Minutes    Clinically Significant Risk Factors Present on Admission           # Hypercalcemia: Highest Ca = 10.6 mg/dL in last 2 days, will monitor as appropriate  # Hypomagnesemia: Lowest Mg = 1.6 mg/dL in last 2 days, will replace as needed     # Drug Induced Platelet Defect: home medication list includes an antiplatelet medication   # Hypertension: Noted on problem list   # Non-Invasive mechanical ventilation: current O2 Device: BiPAP/CPAP  # Acute hypoxic respiratory failure: continue supplemental O2 as needed    # DMII: A1C = 7.2 % (Ref range: 0.0 - 5.6 %) within past 6 months    # Severe Obesity: Estimated body mass index is 54.21 kg/m  as calculated from the following:    Height as of this encounter: 1.6 m (5' 3\").    Weight as of this encounter: 138.8 kg (306 lb).                          "

## 2023-12-09 NOTE — PROGRESS NOTES
Patient continue on BiPAP 16/6 and 65%. FiO2 increased to maintain sats. ABG done, see lab results.

## 2023-12-09 NOTE — PLAN OF CARE
[facility] - ICU    RN Progress Note:            Pertinent Assessments:      Please refer to flowsheet rows for full assessment     Transferred to the ICU at 0100.  Remains on BiPAP at 65% O2.  Continues to receive Lasix IV with good response.            Key Events - This Shift:       Transfer to the ICU .         LUIS SAT (Sedation Awakening Trial): For use ONLY if intubated    SAT Safety Screen Not Applicable   If FAILED why?    SAT Performed Not Applicable   If FAILED why?               Barriers to Discharge / Downgrade:     O2 requirements.

## 2023-12-09 NOTE — PROGRESS NOTES
"0711 continues on BiPAP ST 16/6, 20, .65, SpO2 91 %. BS very diminished bilat, clear. Duoneb tx given inline, tolerated well. BS unchanged after.     0750 MD changed to AVAPS 22, 420, 10/30 min/max pressure. .65 FiO2.     0800 Vt 405, PIP 23, will continue to monitor.     0903 SpO2 97 %,  Per MD FiO2 decreased to .60, Max pressure increased to 35 cm, min pressure increased to 12, PEEP increased to 10.     RT to monitor    /70 (BP Location: Right arm)   Pulse (!) 130   Temp 99.9  F (37.7  C) (Axillary)   Resp 23   Ht 1.6 m (5' 3\")   Wt 138.8 kg (306 lb)   LMP  (LMP Unknown)   SpO2 95%   BMI 54.21 kg/m      "

## 2023-12-09 NOTE — PROGRESS NOTES
Patient transferred to the ICU and remains on BIPAP; CO2 did not improve despite BIPAP adjustments. Neb given. BS decreased wheezing pre/post treatment. RT following.    Kehinde Carlos, RT

## 2023-12-09 NOTE — PROGRESS NOTES
[facility]    Medicine Progress Note - Hospitalist Service    Date of Admission:  12/8/2023    Assessment & Plan                Sammy Laws is a 53 year old female with morbid obesity, cad, dm came in for elective cardiac angiogram. [facility] was asked to see for hypoxia. She is currently in the ICU on BiPAP and may require intubation. Hospital Day: 2         1.Acute on chronic hypercapnic and hypoxic resp failure suspected  -high risk for not only JAMES (never had sleep study), but also hypoventilation syndrome, as well as has severe pulm htn noted on angio  -ABG done was concerning- started on BiPAP in ICU but acidosis remained significant, ongoing adjustment of BiPAP settings by intensivist, most recent VBG shows CO2 finally starting to come down slowly now, but high risk to need intubation  -study eval has been placed in discharge orders- may need something set up sooner with  home DME. This seems fairly urgent. Defer to Pulm     2.Acute diastolic HF  -UOP was poor, today changed to Bumex drip and given Diuril IV with immediate improvement  -needs diurese  -cardiology following  -Murphy in place  -Trend BMP     3.Somnolence  -suspect from above  -may need intubation as above     4.CAD  -s/p cath, no stents placed due to poor hemodynamics at time of cath and need for urgent diuresis. Could benefit from stenting of mid RCA and L PDA. Has multiple other areas of disease not amenable to stenting.     5.DM-keep npo now with bipap, sliding scale  -recent A1c 7.2  -hold po metformin  -cardiology considering Jardiance    6. Morbid obesity  -BMI 54  -recommend consider outpatient Bariatrics eval, this is a life limiting diagnosis at this point    7.HTN  -home losartan    8. Depression  -home Wellbutrin    9. Nicotine dependence  -Patch  -Requested help quitting    K and Mg protocols       DVT Prophylaxis: High risk. Lovenox  Diet: Diet  NPO for Medical/Clinical Reasons Except for: Meds    Murphy  "Catheter: PRESENT, indication: Strict 1-2 Hour I&O  Lines: PRESENT      PICC 12/09/23 Triple Lumen Right Cephalic Vascular access-Site Assessment: WDL      Cardiac Monitoring: ACTIVE order. Indication: Post- PCI/Angiogram (24 hours)  Code Status: Full Code      Clinically Significant Risk Factors           # Hypercalcemia: Highest Ca = 10.6 mg/dL in last 2 days, will monitor as appropriate  # Hypomagnesemia: Lowest Mg = 1.6 mg/dL in last 2 days, will replace as needed       # Hypertension: Noted on problem list  # Acute heart failure with preserved ejection fraction: heart failure noted on problem list, last echo with EF >50%, and receiving IV diuretics      # DMII: A1C = 7.2 % (Ref range: 0.0 - 5.6 %) within past 6 months, PRESENT ON ADMISSION  # Severe Obesity: Estimated body mass index is 54.21 kg/m  as calculated from the following:    Height as of this encounter: 1.6 m (5' 3\").    Weight as of this encounter: 138.8 kg (306 lb)., PRESENT ON ADMISSION            Disposition Plan   Disposition: Home     Expected Discharge Date: 12/10/2023             Medically ready to discharge today: No         Vianey Jackson MD  Hospitalist Service  [facility]  Securely message with [employer] (more info)  Text page via [email or web address] Paging/Directory   ______________________________________________________________________      Physical Exam   Vital Signs: Temp: 99.9  F (37.7  C) Temp src: Axillary BP: (!) 144/71 Pulse: 94   Resp: (!) 36 SpO2: 92 % O2 Device: BiPAP/CPAP Oxygen Delivery: 4 LPM  Weight: 305 lbs 15.98 oz  General: drowsy obese female, awakens briefly to voice then falls back asleep  HEENT: Head normocephalic atraumatic, oral mucosa moist. Sclerae anicteric  CV: Regular rhythm, distant heart sounds  Resp: poor BS bilaterally, shallow respiration  GI: Belly soft, nondistended, nontender, bowel sounds present  Skin: No rashes or lesions  Extremities: 1+ pitting edema bilateral ankles  Psych: drowsy  Neuro: "  drowsy        Medical Decision Making           Data   Recent Results (from the past 12 hour(s))   Basic metabolic panel    Collection Time: 12/09/23  4:31 AM   Result Value Ref Range    Sodium 136 135 - 145 mmol/L    Potassium 5.1 3.4 - 5.3 mmol/L    Chloride 94 (L) 98 - 107 mmol/L    Carbon Dioxide (CO2) 30 (H) 22 - 29 mmol/L    Anion Gap 12 7 - 15 mmol/L    Urea Nitrogen 7.4 6.0 - 20.0 mg/dL    Creatinine 0.53 0.51 - 0.95 mg/dL    GFR Estimate >90 >60 mL/min/1.73m2    Calcium 10.4 (H) 8.6 - 10.0 mg/dL    Glucose 99 70 - 99 mg/dL   Magnesium    Collection Time: 12/09/23  4:31 AM   Result Value Ref Range    Magnesium 2.0 1.7 - 2.3 mg/dL   Extra Purple Top Tube    Collection Time: 12/09/23  4:31 AM   Result Value Ref Range    Hold Specimen JIC    Blood gas arterial    Collection Time: 12/09/23  5:23 AM   Result Value Ref Range    pH Arterial 7.21 (LL) 7.37 - 7.44    pCO2 Arterial >98 (HH) 35 - 45 mm Hg    pO2 Arterial 90 80 - 90 mm Hg    Bicarbonate Arterial 41 (H) 23 - 29 mmol/L    O2 Sat, Arterial 96.4 96.0 - 97.0 %    Oxyhemoglobin 92.2 (L) 96.0 - 97.0 %    Base Excess/Deficit 13.1   mmol/L    Ventilation Mode bipap     Rate 16 rr/min    FIO2 65     PSV 10 cm H2O    Peep 6 cm H2O    Sample Stabilized Temperature 37.0 degrees C    Jose's Test Yes    Glucose by meter    Collection Time: 12/09/23  7:58 AM   Result Value Ref Range    GLUCOSE BY METER POCT 156 (H) 70 - 99 mg/dL   ECG 12-LEAD WITH MUSE (LHE)    Collection Time: 12/09/23  9:16 AM   Result Value Ref Range    Systolic Blood Pressure  mmHg    Diastolic Blood Pressure  mmHg    Ventricular Rate 105 BPM    Atrial Rate 105 BPM    NC Interval 164 ms    QRS Duration 82 ms     ms    QTc 452 ms    P Axis 51 degrees    R AXIS 6 degrees    T Axis 36 degrees    Interpretation ECG       Sinus tachycardia  Biatrial enlargement  Low voltage QRS  Possible Anterolateral infarct (cited on or before 08-DEC-2023)  Abnormal ECG  When compared with ECG of 08-DEC-2023  08:13,  No significant change was found  Confirmed by ASUNCION BASHIR MD LOC:WW ([id number]) on 12/9/2023 11:08:21 AM     Echocardiogram Limited    Collection Time: 12/09/23 11:21 AM   Result Value Ref Range    LVEF  60-65%    Blood gas venous    Collection Time: 12/09/23 12:34 PM   Result Value Ref Range    pH Venous 7.27 (L) 7.35 - 7.45    pCO2 Venous 95 (H) 35 - 50 mm Hg    pO2 Venous 56 (H) 25 - 47 mm Hg    Bicarbonate Venous 43 (H) 24 - 30 mmol/L    Base Excess/Deficit 16.2   mmol/L    Oxyhemoglobin Venous 83.7 (H) 70.0 - 75.0 %    O2 Sat, Venous 86.9 (H) 70.0 - 75.0 %   Glucose by meter    Collection Time: 12/09/23 12:39 PM   Result Value Ref Range    GLUCOSE BY METER POCT 130 (H) 70 - 99 mg/dL   Potassium    Collection Time: 12/09/23  2:53 PM   Result Value Ref Range    Potassium 4.3 3.4 - 5.3 mmol/L   Ionized Calcium    Collection Time: 12/09/23  2:53 PM   Result Value Ref Range    Calcium, Ionized pH 7.4 1.27 1.11 - 1.30 mmol/L    pH 7.28 (L) 7.35 - 7.45    Calcium, Ionized Measured 1.35 (H) 1.11 - 1.30 mmol/L   Magnesium    Collection Time: 12/09/23  2:53 PM   Result Value Ref Range    Magnesium 1.8 1.7 - 2.3 mg/dL   Blood gas venous    Collection Time: 12/09/23  2:53 PM   Result Value Ref Range    pH Venous 7.28 (L) 7.35 - 7.45    pCO2 Venous 92 (H) 35 - 50 mm Hg    pO2 Venous 47 25 - 47 mm Hg    Bicarbonate Venous 43 (H) 24 - 30 mmol/L    Base Excess/Deficit 15.8   mmol/L    Oxyhemoglobin Venous 77.7 (H) 70.0 - 75.0 %    O2 Sat, Venous 80.5 (H) 70.0 - 75.0 %     Interval History   Lethargic. On BiPAP. Opens eyes, looks at me when I call her name, then falls back asleep. In no distress

## 2023-12-09 NOTE — PROCEDURES
"PICC Line Insertion Procedure Note  Pt. Name: Sammy Laws  MRN:        [id number]    Procedure: Insertion of a  triple Lumen  5 fr  Bard SOLO (valved) Power PICC, Lot number [id number]    Indications: Vascullar Access    Contraindications : none    Procedure Details     Patient identified with 2 identifiers and \"Time Out\" conducted.  .     Central line insertion bundle followed: hand hygiene performed prior to procedure, site cleansed with cholraprep, hat, mask, sterile gloves, sterile gown worn, patient draped with maximum barrier head to toe drape, sterile field maintained.    The vein was assessed and found to be compressible and of adequate size.     Lidocaine 1% 2 ml administered sq to the insertion site. A 5 Fr PICC was inserted into the cephalic vein of the right arm with ultrasound guidance. 1 attempt(s) required to access vein.   Catheter threaded without difficulty. Good blood return noted.    Modified Seldinger Technique used for insertion.    The 8 sharps that are included in the PICC insertion kit were accounted for and disposed of in the sharps container prior to breakdown of the sterile field.    Catheter secured with Statlock, biopatch and Tegaderm dressing applied.    Findings:    Total catheter length  45 cm, with 4 cm exposed. Mid upper arm circumference is 48 cm. Catheter was flushed with 30 cc NS. Patient  tolerated procedure well.    Tip placement verified by 3CG technology. Tip placement in the SVC.    CLABSI prevention brochure left at bedside.    Patient's primary RN notified PICC is ready for use.      Comments:        David DUMONTN,RN,[facility]  Vascular Access - [facility]      "

## 2023-12-09 NOTE — CONSULTS
PULMONARY MEDICINE CONSULT  12/9/2023    CODE: Full Code    Reason for Consult: CO2 retention    Assessment/Plan:   53 year old smoker with most pertinent history of morbid obesity, systolic heart failure, hypertension, admitted 12/8/2023 for RH/LH catheterization, which was significant for presence of acutely decompensated biventricular heart failure with volume overload and subsequent acute on chronic hypoxic hypercapnic respiratory failure.  She is hospitalized for IV diuresis.  She is being transferred to the ICU for closer monitoring in setting of acute on chronic hypercapnia that does not seem to be responding to BiPAP.    #. Acute on chronic hypercapnic respiratory failure.  Based on prior laboratory results, suspect that her baseline pCO2 is ~ 65. Patient's clinical status does not reflect her lab results -- she does not demonstrate stigmata of hypercapnic encephalopathy.   #. Acute hypoxic respiratory failure in setting of acutely decompensated biventricular heart failure and severe pulmonary hypertension.  #. Suspected OHS and possible JAMES.  #. Severe pulmonary hypertension, WHO II/III (sleep disordered breathing).   #. Acutely decompensated biventricular heart failure.  #. Pulmonary edema.  #. Tobacco use disorder.    Recommendations:  Goal SpO2 >/= 89, wean off supplemental oxygen as able, avoid hyperoxia  Continue with BiPAP overnight (assessed for AVAPS, however the setting achieves lower tidal volumes for this patient) & check ABG in the morning  Repeat ABG if clinical concern of deteriorating ventilation  Position in bed to remain as upright as possible to improve ventilation  Ordered DuoNeb QID -- patient is a smoker & it is reasonable to treat possible airflow obstruction  Requires aggressive diuresis -- this is being managed by the Cardiology team; ordered strict I/O & electrolyte replacement protocols  Will need to be evaluated by [facility] for qualifying NIPPV ([phone])  Will need an  outpatient Sleep Medicine consult & a formal PSG    Thank you for this consult. Pulmonary will continue to follow along w/ you to assist w/ disordered sleep breathing evaluation & qualification for NIPPV.    Lori Snider MD  Pulmonary and Critical Care     HPI:   HISTORY OF PRESENTING ILLNESS:  Sammy Laws is a 53 year old smoker with history of morbid obesity, hypertension, systolic heart failure, hypertension, admitted to [facility] on 12/8/23 for right and left heart cath for evaluation of her exertional dyspnea & acutely decompensated heart failure. Her heart cath demonstrated multivessel CAD, severe WHO II pulmonary hypertension with a severely elevated PCWP.  Patient was admitted post cath with acutely decompensated biventricular heart failure with subsequent acute on chronic hypoxic and hypercapnic respiratory failure.  She was started on scheduled IV diuresis with a plan to diurese and possibly perform a PCI of the PDA and mid RCA.    I was contacted by Dr. Landry with concerns of persistent hypoventilation and CO2 retention, which unfortunately continued despite being started on BiPAP.  Based on chart review and report from the hospitalist, patient frequently feels short of breath when sleeping endorsing what sounds like orthopnea.  Partner reported that she has to sleep at a 45 degree angle and has some, but not much snoring.  Sounds like patient wakes up intermittently through the night with a sense of panic, which would be consistent with apneic episodes.    On arrival to the ICU patient is awake, appropriate, & has a sense of humor at having to be awake so late into the night. She is able to transfer to the ICU bed w/ minimal assistance. She is breathing comfortably w/o BiPAP or supplemental oxygen during the transfer & associated physical activity.     REVIEW OF SYSTEMS: history unobtainable from patient due to difficulty communicating on BiPAP.     MEDICAL HISTORY: Morbid obesity,  hypertension, systolic heart failure, T2DM  SURGICAL HISTORY:  has no past surgical history on file.  SOCIAL HISTORY:  reports that she has been smoking cigarettes. She has been smoking an average of 1 pack per day. She has been exposed to tobacco smoke. She has never used smokeless tobacco.  FAMILY HISTORY: family history is not on file.  MEDICATIONS: personally reviewed, including EMR/Care Everywhere. Pertinent information noted & updated.     ALLERGIES: No Known Allergies    Vitals: Temp:  [98.4  F (36.9  C)-99.1  F (37.3  C)] 98.5  F (36.9  C)  Pulse:  [101-117] 111  Resp:  [0-20] 20  BP: (130-182)/(60-98) 130/73  FiO2 (%):  [40 %] 40 %  SpO2:  [71 %-95 %] 89 %    Physical Exam:   General: Morbidly obese woman slouched in bed, pajamas soaked with urine, BiPAP mask in place with good fit  HEENT: EOMI, minimal leak on BiPAP  CV: Tachycardic but regular; extremities well perfused  Pulm: Diminished but clear breath sounds throughout, no wheezing, no rhonchi, unable to appreciate any crackles, no cough; no accessory muscle use  Abd: soft, ND, quiet bowel sounds  Msk: Warm to touch,3+ BLE edema  Derm: No acute lesions or rashes on limited exam  Neuro: Sleeping, but awakens easily to voice; moves extremities without focal deficit; no asterixis  Psych: Calm    Labs: personally reviewed & interpreted in EMR.   Imaging: personally reviewed & interpreted, including formal Radiology report in the EMR.    Total time of 85 minutes was devoted to the consult with at least 50% of the time spent in direct patient interaction, education, and coordination of patient's care.    This report was prepared using speech recognition software.  Any typographical errors are unintentional.

## 2023-12-09 NOTE — CONSULTS
[facility]   [address], [place] [address]   [email or web address]   OFFICE: [phone]     CARDIOLOGY INPATIENT CONSULT NOTE     Impression and Plan     Assessment:  Acute decompensated heart failure with mildly reduced LVEF. LVEF of 45%. Currently responding to diuresis.  Severe WHO group II pulmonary hypertension due to decompensated left heart failure.  Hypertension - controlled.  Severe morbid obesity with BMI 54  DMII -not on insulin.     Plan:  Continue aggressive diuresis  Consider adding b-blocker when more compensated.  Consider using ozempic or Jardiance for DM management and HF.   Will follow.    Primary Cardiologist: Dr. Abdelrahman Miranda MD    History of Present Illness      Ms. Sammy Laws is a 53 year old female with a history of  morbid obesity, HFrEF, HTN, DMII who underwent R/LHC yesterday and was found to have severe post-capillary pulmonary hypertension with an PCWP of 45 mmHg (severely elevated). She was admitted for diuresis and heart failure management.    Sammy is currently on BiPAP at 60% FiO2. Is tired. Breathing stable with NIPPV.     Other than noted above, Ms. Laws denies any chest pain/pressure/tightness, shortness of breath at rest or with exertion, light headedness/dizziness, pre-syncope, syncope, lower extremity swelling, palpitations, paroxysmal nocturnal dyspnea (PND), or orthopnea.    Review of Systems:  Further review of systems is otherwise negative/noncontributory (based on review of medical record (admission H&P) and 13 point review of systems reviewed. Pertinent positives noted).    Cardiac Diagnostics     ECG: Personally reviewed and interpreted: sinus tachycardia, poor R wave progression, low voltage    Telemetry (personally reviewed): sinus tachycardia    Most recent:  Echocardiogram (results reviewed):   TTE 11/17/23  1. Technically difficult study despite utilization of ultrasound enhancing agent.  2. The  "left ventricle is normal in size. Image quality does not provide for detailed assessment of LV systolic function, but is felt to be mildly decreased with a visually estimated ejection fraction of roughly 45%.  3. There is suspected mild global reduction in left ventricular systolic performance.  4. No significant valvular heart disease is identified on this study though the sensitivity, particularly of regurgitant lesions, is reduced due to poor Doppler acoustics.  5. Probable mild right ventricular enlargement with mildly reduced right ventricular systolic performance though the specificity is significantly reduced due to suboptimal acoustic imaging.     The acoustic quality of this study is very poor. If a more accurate assessment of left ventricular systolic performance, regional wall motion, and other morphology/function is required; would recommend cardiac MRI or other alternative imaging modality for further evaluation.    Cardiac Cath (results reviewed):   /[facility] 12/8/23  Multivessel CAD as described below:  LM: Normal  LAD: Diffuse mild disease with a focal moderate lesion in the distal LAD.  DFR is positive (0.84) beyond the distal LAD lesion, but DFR pullback shows gradual change in DFR, with no focal \"step-up\" to suggest a benefit of PCI.  LCx: Codominant vessel with severe stenosis downstream in the L PDA.  This is a relatively small vessel at this point (roughly 2 mm in diameter).  RCA: Codominant vessel with chronic subtotal occlusion in the mid vessel, with ALVINA I flow distally.  Severe WHO II (postcapillary) pulmonary hypertension.  Severely elevated pulmonary capillary wedge pressure (~45 mmHg).  Normal cardiac index by Tiana.      Medical History  Surgical History Family History Social History   Past Medical History:   Diagnosis Date    CHF (congestive heart failure) (H)     DMII (diabetes mellitus, type 2) (H)     Essential hypertension     Morbid obesity (H)     Pulmonary hypertension (H)      " History reviewed. No pertinent surgical history.  History reviewed. No pertinent family history.        Social History     Socioeconomic History    Marital status: Single     Spouse name: Not on file    Number of children: Not on file    Years of education: Not on file    Highest education level: Not on file   Occupational History    Not on file   Tobacco Use    Smoking status: Every Day     Packs/day: 1     Types: Cigarettes     Passive exposure: Current    Smokeless tobacco: Never   Vaping Use    Vaping Use: Never used   Substance and Sexual Activity    Alcohol use: Not on file    Drug use: Not on file    Sexual activity: Not on file   Other Topics Concern    Not on file   Social History Narrative    Not on file     Social Determinants of Health     Financial Resource Strain: Low Risk  (11/1/2023)    Financial Resource Strain     Within the past 12 months, have you or your family members you live with been unable to get utilities (heat, electricity) when it was really needed?: No   Food Insecurity: Low Risk  (11/1/2023)    Food Insecurity     Within the past 12 months, did you worry that your food would run out before you got money to buy more?: No     Within the past 12 months, did the food you bought just not last and you didn t have money to get more?: No   Transportation Needs: Low Risk  (11/1/2023)    Transportation Needs     Within the past 12 months, has lack of transportation kept you from medical appointments, getting your medicines, non-medical meetings or appointments, work, or from getting things that you need?: No   Physical Activity: Not on file   Stress: Not on file   Social Connections: Not on file   Interpersonal Safety: Not on file   Housing Stability: Low Risk  (11/1/2023)    Housing Stability     Do you have housing? : Yes     Are you worried about losing your housing?: No             Physical Examination   VITALS: /70 (BP Location: Right arm)   Pulse (!) 130   Temp 99.9  F (37.7  C)  "(Axillary)   Resp 23   Ht 1.6 m (5' 3\")   Wt 138.8 kg (306 lb)   LMP  (LMP Unknown)   SpO2 95%   BMI 54.21 kg/m    BMI: Body mass index is 54.21 kg/m .  Wt Readings from Last 3 Encounters:   12/09/23 138.8 kg (306 lb)   11/21/23 (!) 151.5 kg (334 lb 1.6 oz)   11/01/23 (!) 157.2 kg (346 lb 8 oz)       Intake/Output Summary (Last 24 hours) at 12/9/2023 1002  Last data filed at 12/9/2023 0645  Gross per 24 hour   Intake 50 ml   Output 2250 ml   Net -2200 ml       General: morbidly obese woman. On BiPAP. Appears comfortable and sleepy.  HENT: external ears normal. Nares patent. Mucous membranes moist.  Eyes: perrla, extraocular muscles intact. No scleral icterus.   Neck: JVD unable to be assessed on NIPPV  Lungs: clear to auscultation  COR: borderline fast rate, regular rhythm, No murmurs, rubs, or gallops  Abd: nondistended, BS present  Extrem: 2+ BLE edema         Non-cardiac Imaging Studies Reviewed      Chest x-ray: IMPRESSION:     Diffuse interstitial prominence with hazy perihilar and lower lung opacities, likely mild interstitial and alveolar pulmonary edema.     No pleural effusion or pneumothorax.     Stable cardiomegaly.        Lab Results Reviewed    Chemistry/lipid CBC Cardiac Enzymes/BNP/TSH/INR   Recent Labs   Lab Test 12/08/23 0754   CHOL 149   HDL 34*   LDL 93   TRIG 108     Recent Labs   Lab Test 12/08/23 0754 11/01/23 0822   LDL 93 112*     Recent Labs   Lab Test 12/09/23 0758 12/09/23 0431   NA  --  136   POTASSIUM  --  5.1   CHLORIDE  --  94*   CO2  --  30*   * 99   BUN  --  7.4   CR  --  0.53   GFRESTIMATED  --  >90   IGNACIO  --  10.4*     Recent Labs   Lab Test 12/09/23 0431 12/08/23 2028 12/08/23 0754   CR 0.53 0.50* 0.49*     Recent Labs   Lab Test 11/01/23 0822   A1C 7.2*          Recent Labs   Lab Test 12/08/23 0754   WBC 8.0   HGB 18.4*   HCT 66.3*   MCV 86        Recent Labs   Lab Test 12/08/23  0754 11/01/23  0822   HGB 18.4* 18.4*    No results for input(s): " "\"TROPONINI\" in the last 31586 hours.  Recent Labs   Lab Test 12/08/23 2028 11/21/23  1716   NTBNPI 749  --    NTBNP  --  951*     Recent Labs   Lab Test 12/08/23 2028   TSH 1.47     No results for input(s): \"INR\" in the last 33525 hours.        Current Inpatient Scheduled Medications   Scheduled Meds:   buPROPion  150 mg Oral QAM    enoxaparin ANTICOAGULANT  40 mg Subcutaneous Q24H    furosemide  40 mg Intravenous Q6H    insulin aspart  1-6 Units Subcutaneous Q4H    ipratropium - albuterol 0.5 mg/2.5 mg/3 mL  3 mL Nebulization 4x daily    losartan  50 mg Oral Daily    [Held by provider] metFORMIN  500 mg Oral BID w/meals    nicotine  1 patch Transdermal Daily    nicotine   Transdermal Q8H    pantoprazole  40 mg Intravenous Q24H    sodium chloride (PF)  3 mL Intracatheter Q8H     Continuous Infusions:    Current Outpatient Medications   Medication Sig Dispense Refill    torsemide (DEMADEX) 20 MG tablet Take 2 tablets (40 mg) once daily on 12/9, 12/10, 12/11, and 12/12.  Get your labs drawn as ordered on either 12/11 or 12/12.  You will then be contacted with recommendations for what dose to take after this.  If you do not hear from our clinic by 12/13, you should plan on reducing your dose down to 1 tablet (20 mg) daily on 12/13. 90 tablet 3          Medications Prior to Admission   Prior to Admission medications    Medication Sig Start Date End Date Taking? Authorizing Provider   aspirin 81 MG EC tablet Take 1 tablet (81 mg) by mouth daily 11/1/23  Yes Michell Lloyd NP   buPROPion (WELLBUTRIN XL) 150 MG 24 hr tablet Take 1 tablet (150 mg) by mouth every morning 11/1/23  Yes Michell Lloyd NP   losartan (COZAAR) 50 MG tablet Take 1 tablet (50 mg) by mouth daily 11/21/23  Yes Abdelrahman Miranda MD   metFORMIN (GLUCOPHAGE XR) 500 MG 24 hr tablet Take 1 tablet (500 mg) by mouth 2 times daily (with meals) for 90 days 11/1/23 1/30/24 Yes Mihcell Lloyd NP   torsemide (DEMADEX) 20 MG tablet Take 2 tablets (40 mg) once " "daily on 12/9, 12/10, 12/11, and 12/12.  Get your labs drawn as ordered on either 12/11 or 12/12.  You will then be contacted with recommendations for what dose to take after this.  If you do not hear from our clinic by 12/13, you should plan on reducing your dose down to 1 tablet (20 mg) daily on 12/13. 12/8/23  Yes Abhi Valiente MD              Clinically Significant Risk Factors Present on Admission           # Hypercalcemia: Highest Ca = 10.6 mg/dL in last 2 days, will monitor as appropriate  # Hypomagnesemia: Lowest Mg = 1.6 mg/dL in last 2 days, will replace as needed     # Drug Induced Platelet Defect: home medication list includes an antiplatelet medication   # Hypertension: Noted on problem list   # Non-Invasive mechanical ventilation: current O2 Device: BiPAP/CPAP  # Acute hypoxic respiratory failure: continue supplemental O2 as needed    # DMII: A1C = 7.2 % (Ref range: 0.0 - 5.6 %) within past 6 months    # Severe Obesity: Estimated body mass index is 54.21 kg/m  as calculated from the following:    Height as of this encounter: 1.6 m (5' 3\").    Weight as of this encounter: 138.8 kg (306 lb).                 "

## 2023-12-09 NOTE — PLAN OF CARE
Problem: Cardiac Catheterization (Diagnostic/Interventional)  Goal: Absence of Bleeding  Outcome: Progressing  Goal: Stable Heart Rate and Rhythm  Outcome: Progressing  Goal: Absence of Embolism Signs and Symptoms  Outcome: Progressing  Goal: Optimal Pain Control and Function  Outcome: Progressing  Goal: Absence of Vascular Access Complication  Outcome: Progressing  Intervention: Prevent and Manage Access Complications  Recent Flowsheet Documentation  Taken 12/9/2023 0011 by Dorota Germain, RN  Activity Management: activity adjusted per tolerance  Head of Bed (HOB) Positioning: HOB at 90 degrees  Taken 12/8/2023 1945 by Dorota Germain, RN  Activity Management: activity adjusted per tolerance     Problem: Pulmonary Impairment  Goal: Optimal Gas Exchange  Outcome: Not Progressing     Goal Outcome Evaluation:         Lungs diminished. Utilizing bipap. Blood gases worsening even with continuous Bipap used. Dr. Landry aware. Sinus tachycardia on the monitor. 's. Denied any pain. Getting IV lasix. Had one incontinence episode.        Transferred to ICU for closer monitoring. Per Dr. Landry's note, she updated her significant other.

## 2023-12-09 NOTE — PROGRESS NOTES
"Continues on BiPAP AVAPS mode RR 22, Vt 420, FiO2 .60, BS diminished bilat. Duoneb tx given/inline. Tolerated well. BS after unchanged.     /57   Pulse 98   Temp 99.9  F (37.7  C) (Axillary)   Resp 29   Ht 1.6 m (5' 3\")   Wt 138.8 kg (306 lb)   LMP  (LMP Unknown)   SpO2 93%   BMI 54.21 kg/m      RT to monitor, titrate FiO2 as tolerated.    "

## 2023-12-09 NOTE — PROGRESS NOTES
Continues on BiPAP AVAPS 450, 22, 10 EPAP, min/max 12/35. Tolerates well, MD changed Duoneb tx to Q 6 hour PRN 2/2 several recent runs of V-tach. Labs pending. VBG results below:     Latest Reference Range & Units 12/09/23 14:53   Ph Venous 7.35 - 7.45  7.28 (L)   PCO2 Venous 35 - 50 mm Hg 92 (H)   PO2 Venous 25 - 47 mm Hg 47   O2 Sat, Venous 70.0 - 75.0 % 80.5 (H)   Bicarbonate Venous 24 - 30 mmol/L 43 (H)   Base Excess Venous mmol/L 15.8   Oxyhemoglobin Venous 70.0 - 75.0 % 77.7 (H)   (L): Data is abnormally low  (H): Data is abnormally high    1537 Vt increased to 450 ml. RT to monitor.

## 2023-12-09 NOTE — PROGRESS NOTES
Blood gas reveals: pH 7.31, pCO2 71 mmHg, pO2 39 mmHg, Bicarb 36 mmol/L. Pt is alert/oriented.    Kehinde Carlos, RT

## 2023-12-10 ENCOUNTER — APPOINTMENT (OUTPATIENT)
Dept: PHYSICAL THERAPY | Facility: HOSPITAL | Age: 53
DRG: 286 | End: 2023-12-10
Attending: INTERNAL MEDICINE
Payer: COMMERCIAL

## 2023-12-10 ENCOUNTER — APPOINTMENT (OUTPATIENT)
Dept: OCCUPATIONAL THERAPY | Facility: HOSPITAL | Age: 53
DRG: 286 | End: 2023-12-10
Attending: INTERNAL MEDICINE
Payer: COMMERCIAL

## 2023-12-10 PROBLEM — I27.20 PULMONARY HYPERTENSION (H): Status: ACTIVE | Noted: 2023-12-10

## 2023-12-10 PROBLEM — F17.200 NICOTINE DEPENDENCE: Status: ACTIVE | Noted: 2023-12-10

## 2023-12-10 LAB
ANION GAP SERPL CALCULATED.3IONS-SCNC: 5 MMOL/L (ref 7–15)
BASE EXCESS BLDV CALC-SCNC: 22.3 MMOL/L
BASE EXCESS BLDV CALC-SCNC: 23.7 MMOL/L
BASE EXCESS BLDV CALC-SCNC: 24.3 MMOL/L
BASE EXCESS BLDV CALC-SCNC: 24.8 MMOL/L
BUN SERPL-MCNC: 9.4 MG/DL (ref 6–20)
CALCIUM SERPL-MCNC: 10.8 MG/DL (ref 8.6–10)
CHLORIDE SERPL-SCNC: 88 MMOL/L (ref 98–107)
CREAT SERPL-MCNC: 0.47 MG/DL (ref 0.51–0.95)
DEPRECATED HCO3 PLAS-SCNC: 42 MMOL/L (ref 22–29)
EGFRCR SERPLBLD CKD-EPI 2021: >90 ML/MIN/1.73M2
GLUCOSE BLDC GLUCOMTR-MCNC: 117 MG/DL (ref 70–99)
GLUCOSE BLDC GLUCOMTR-MCNC: 127 MG/DL (ref 70–99)
GLUCOSE BLDC GLUCOMTR-MCNC: 133 MG/DL (ref 70–99)
GLUCOSE BLDC GLUCOMTR-MCNC: 138 MG/DL (ref 70–99)
GLUCOSE BLDC GLUCOMTR-MCNC: 182 MG/DL (ref 70–99)
GLUCOSE SERPL-MCNC: 104 MG/DL (ref 70–99)
HCO3 BLDV-SCNC: 48 MMOL/L (ref 24–30)
HCO3 BLDV-SCNC: 49 MMOL/L (ref 24–30)
HCO3 BLDV-SCNC: 49 MMOL/L (ref 24–30)
HCO3 BLDV-SCNC: 50 MMOL/L (ref 24–30)
MAGNESIUM SERPL-MCNC: 1.5 MG/DL (ref 1.7–2.3)
MAGNESIUM SERPL-MCNC: 2 MG/DL (ref 1.7–2.3)
OXYHGB MFR BLDV: 75.7 % (ref 70–75)
OXYHGB MFR BLDV: 76.5 % (ref 70–75)
OXYHGB MFR BLDV: 78.4 % (ref 70–75)
OXYHGB MFR BLDV: 80.6 % (ref 70–75)
PCO2 BLDV: 81 MM HG (ref 35–50)
PCO2 BLDV: 84 MM HG (ref 35–50)
PCO2 BLDV: 88 MM HG (ref 35–50)
PCO2 BLDV: 88 MM HG (ref 35–50)
PH BLDV: 7.34 [PH] (ref 7.35–7.45)
PH BLDV: 7.36 [PH] (ref 7.35–7.45)
PH BLDV: 7.38 [PH] (ref 7.35–7.45)
PH BLDV: 7.39 [PH] (ref 7.35–7.45)
PO2 BLDV: 43 MM HG (ref 25–47)
PO2 BLDV: 45 MM HG (ref 25–47)
PO2 BLDV: 47 MM HG (ref 25–47)
PO2 BLDV: 50 MM HG (ref 25–47)
POTASSIUM SERPL-SCNC: 4.4 MMOL/L (ref 3.4–5.3)
SAO2 % BLDV: 77.2 % (ref 70–75)
SAO2 % BLDV: 78.2 % (ref 70–75)
SAO2 % BLDV: 80.6 % (ref 70–75)
SAO2 % BLDV: 82.7 % (ref 70–75)
SODIUM SERPL-SCNC: 135 MMOL/L (ref 135–145)

## 2023-12-10 PROCEDURE — 97535 SELF CARE MNGMENT TRAINING: CPT | Mod: GO

## 2023-12-10 PROCEDURE — 250N000013 HC RX MED GY IP 250 OP 250 PS 637: Performed by: INTERNAL MEDICINE

## 2023-12-10 PROCEDURE — 94660 CPAP INITIATION&MGMT: CPT

## 2023-12-10 PROCEDURE — 999N000287 HC ICU ADULT ROUNDING, EACH 10 MINS

## 2023-12-10 PROCEDURE — 200N000001 HC R&B ICU

## 2023-12-10 PROCEDURE — 97166 OT EVAL MOD COMPLEX 45 MIN: CPT | Mod: GO

## 2023-12-10 PROCEDURE — 99232 SBSQ HOSP IP/OBS MODERATE 35: CPT | Performed by: INTERNAL MEDICINE

## 2023-12-10 PROCEDURE — 82805 BLOOD GASES W/O2 SATURATION: CPT | Performed by: INTERNAL MEDICINE

## 2023-12-10 PROCEDURE — 97162 PT EVAL MOD COMPLEX 30 MIN: CPT | Mod: GP

## 2023-12-10 PROCEDURE — 94640 AIRWAY INHALATION TREATMENT: CPT | Mod: 76

## 2023-12-10 PROCEDURE — 999N000157 HC STATISTIC RCP TIME EA 10 MIN

## 2023-12-10 PROCEDURE — 80048 BASIC METABOLIC PNL TOTAL CA: CPT | Performed by: INTERNAL MEDICINE

## 2023-12-10 PROCEDURE — 83735 ASSAY OF MAGNESIUM: CPT | Performed by: INTERNAL MEDICINE

## 2023-12-10 PROCEDURE — 250N000011 HC RX IP 250 OP 636: Mod: JZ | Performed by: INTERNAL MEDICINE

## 2023-12-10 PROCEDURE — 250N000009 HC RX 250: Performed by: INTERNAL MEDICINE

## 2023-12-10 PROCEDURE — 94640 AIRWAY INHALATION TREATMENT: CPT

## 2023-12-10 PROCEDURE — 97110 THERAPEUTIC EXERCISES: CPT | Mod: GP

## 2023-12-10 PROCEDURE — 999N000009 HC STATISTIC AIRWAY CARE

## 2023-12-10 PROCEDURE — 99233 SBSQ HOSP IP/OBS HIGH 50: CPT | Performed by: HOSPITALIST

## 2023-12-10 PROCEDURE — 99233 SBSQ HOSP IP/OBS HIGH 50: CPT | Performed by: INTERNAL MEDICINE

## 2023-12-10 PROCEDURE — C9113 INJ PANTOPRAZOLE SODIUM, VIA: HCPCS | Performed by: INTERNAL MEDICINE

## 2023-12-10 RX ORDER — FAMOTIDINE 10 MG
10 TABLET ORAL 2 TIMES DAILY PRN
Status: DISCONTINUED | OUTPATIENT
Start: 2023-12-10 | End: 2023-12-13

## 2023-12-10 RX ORDER — BUMETANIDE 0.25 MG/ML
1 INJECTION INTRAMUSCULAR; INTRAVENOUS
Status: DISCONTINUED | OUTPATIENT
Start: 2023-12-10 | End: 2023-12-11

## 2023-12-10 RX ORDER — MAGNESIUM SULFATE 4 G/50ML
4 INJECTION INTRAVENOUS ONCE
Status: COMPLETED | OUTPATIENT
Start: 2023-12-10 | End: 2023-12-10

## 2023-12-10 RX ORDER — BISACODYL 10 MG
10 SUPPOSITORY, RECTAL RECTAL DAILY PRN
Status: DISCONTINUED | OUTPATIENT
Start: 2023-12-10 | End: 2023-12-16

## 2023-12-10 RX ORDER — MAGNESIUM SULFATE HEPTAHYDRATE 40 MG/ML
2 INJECTION, SOLUTION INTRAVENOUS ONCE
Status: COMPLETED | OUTPATIENT
Start: 2023-12-10 | End: 2023-12-10

## 2023-12-10 RX ORDER — HYDRALAZINE HYDROCHLORIDE 20 MG/ML
5 INJECTION INTRAMUSCULAR; INTRAVENOUS EVERY 6 HOURS PRN
Status: DISCONTINUED | OUTPATIENT
Start: 2023-12-10 | End: 2023-12-19 | Stop reason: HOSPADM

## 2023-12-10 RX ORDER — CALCIUM CARBONATE 500 MG/1
500 TABLET, CHEWABLE ORAL 3 TIMES DAILY PRN
Status: DISCONTINUED | OUTPATIENT
Start: 2023-12-10 | End: 2023-12-19 | Stop reason: HOSPADM

## 2023-12-10 RX ORDER — BUMETANIDE 1 MG/1
1 TABLET ORAL
Status: DISCONTINUED | OUTPATIENT
Start: 2023-12-10 | End: 2023-12-10

## 2023-12-10 RX ORDER — ONDANSETRON 2 MG/ML
4 INJECTION INTRAMUSCULAR; INTRAVENOUS EVERY 6 HOURS PRN
Status: DISCONTINUED | OUTPATIENT
Start: 2023-12-10 | End: 2023-12-19 | Stop reason: HOSPADM

## 2023-12-10 RX ORDER — POLYETHYLENE GLYCOL 3350 17 G/17G
17 POWDER, FOR SOLUTION ORAL DAILY PRN
Status: DISCONTINUED | OUTPATIENT
Start: 2023-12-10 | End: 2023-12-16

## 2023-12-10 RX ORDER — DOCUSATE SODIUM 100 MG/1
100 CAPSULE, LIQUID FILLED ORAL 2 TIMES DAILY PRN
Status: DISCONTINUED | OUTPATIENT
Start: 2023-12-10 | End: 2023-12-16

## 2023-12-10 RX ORDER — CARBOXYMETHYLCELLULOSE SODIUM 5 MG/ML
1 SOLUTION/ DROPS OPHTHALMIC 3 TIMES DAILY PRN
Status: DISCONTINUED | OUTPATIENT
Start: 2023-12-10 | End: 2023-12-19 | Stop reason: HOSPADM

## 2023-12-10 RX ORDER — LANOLIN ALCOHOL/MO/W.PET/CERES
3 CREAM (GRAM) TOPICAL
Status: DISCONTINUED | OUTPATIENT
Start: 2023-12-10 | End: 2023-12-19 | Stop reason: HOSPADM

## 2023-12-10 RX ORDER — ENOXAPARIN SODIUM 100 MG/ML
40 INJECTION SUBCUTANEOUS EVERY 12 HOURS
Status: DISCONTINUED | OUTPATIENT
Start: 2023-12-10 | End: 2023-12-19 | Stop reason: HOSPADM

## 2023-12-10 RX ORDER — ALBUTEROL SULFATE 0.83 MG/ML
2.5 SOLUTION RESPIRATORY (INHALATION) 4 TIMES DAILY
Status: DISCONTINUED | OUTPATIENT
Start: 2023-12-10 | End: 2023-12-17

## 2023-12-10 RX ADMIN — ALBUTEROL SULFATE 2.5 MG: 2.5 SOLUTION RESPIRATORY (INHALATION) at 08:43

## 2023-12-10 RX ADMIN — ALBUTEROL SULFATE 2.5 MG: 2.5 SOLUTION RESPIRATORY (INHALATION) at 15:20

## 2023-12-10 RX ADMIN — LOSARTAN POTASSIUM 50 MG: 50 TABLET, FILM COATED ORAL at 08:30

## 2023-12-10 RX ADMIN — MAGNESIUM SULFATE HEPTAHYDRATE 2 G: 40 INJECTION, SOLUTION INTRAVENOUS at 16:14

## 2023-12-10 RX ADMIN — BUMETANIDE 1 MG: 0.25 INJECTION INTRAMUSCULAR; INTRAVENOUS at 21:04

## 2023-12-10 RX ADMIN — ALBUTEROL SULFATE 2.5 MG: 2.5 SOLUTION RESPIRATORY (INHALATION) at 11:30

## 2023-12-10 RX ADMIN — MAGNESIUM SULFATE HEPTAHYDRATE 4 G: 80 INJECTION, SOLUTION INTRAVENOUS at 06:21

## 2023-12-10 RX ADMIN — PANTOPRAZOLE SODIUM 40 MG: 40 INJECTION, POWDER, FOR SOLUTION INTRAVENOUS at 09:03

## 2023-12-10 RX ADMIN — ALBUTEROL SULFATE 2.5 MG: 2.5 SOLUTION RESPIRATORY (INHALATION) at 19:49

## 2023-12-10 RX ADMIN — BUPROPION HYDROCHLORIDE 150 MG: 150 TABLET, FILM COATED, EXTENDED RELEASE ORAL at 08:29

## 2023-12-10 RX ADMIN — NICOTINE 1 PATCH: 21 PATCH, EXTENDED RELEASE TRANSDERMAL at 16:18

## 2023-12-10 RX ADMIN — ENOXAPARIN SODIUM 40 MG: 40 INJECTION SUBCUTANEOUS at 21:05

## 2023-12-10 RX ADMIN — BUMETANIDE 1 MG: 0.25 INJECTION INTRAMUSCULAR; INTRAVENOUS at 09:25

## 2023-12-10 RX ADMIN — ENOXAPARIN SODIUM 40 MG: 40 INJECTION SUBCUTANEOUS at 09:24

## 2023-12-10 ASSESSMENT — ACTIVITIES OF DAILY LIVING (ADL)
ADLS_ACUITY_SCORE: 32
ADLS_ACUITY_SCORE: 47
ADLS_ACUITY_SCORE: 43
ADLS_ACUITY_SCORE: 43
ADLS_ACUITY_SCORE: 47
ADLS_ACUITY_SCORE: 47
ADLS_ACUITY_SCORE: 32
ADLS_ACUITY_SCORE: 47
ADLS_ACUITY_SCORE: 43
ADLS_ACUITY_SCORE: 47

## 2023-12-10 NOTE — PROGRESS NOTES
"HFNC 50 lpm/65 %, SpO2 94 %, BS diminished bilat, E/wheezes RUL. Albuterol tx given inline. Tolerated well. BS after increased aeration, E/wheezes RUL, diminished bases.    /76   Pulse 101   Temp 98.8  F (37.1  C) (Axillary)   Resp 24   Ht 1.6 m (5' 3\")   Wt 134 kg (295 lb 6.7 oz)   LMP  (LMP Unknown)   SpO2 94%   BMI 52.33 kg/m      FiO2 decreased to .60. RT to monitor  "

## 2023-12-10 NOTE — PROGRESS NOTES
12/10/23 1030   Appointment Info   Signing Clinician's Name / Credentials (OT) Samira Welsh JEN OTR/L CLT   Living Environment   People in Home significant other   Current Living Arrangements house   Home Accessibility stairs to enter home   Number of Stairs, Main Entrance 1   Self-Care   Activity/Exercise/Self-Care Comment I at baseline, works full time   General Information   Onset of Illness/Injury or Date of Surgery 12/08/23   Referring Physician Dr. Roach   Additional Occupational Profile Info/Pertinent History of Current Problem 53 year old female active smoker with a history of severe obesity, DM2, primary HTN, chronic dyspnea and peripheral edema, presented on 12/8 for elective right and left heart catheterization, found to have severe pulmonary HTN, severely elevated PAOP, systolic and diastolic heart failure, now with worsening acute on chronic respiratory failure with hypercapnia and hypoxia requiring NIPPV.   Existing Precautions/Restrictions no known precautions/restrictions   Cognitive Status Examination   Affect/Mental Status (Cognitive) WNL   Follows Commands WNL   Bed Mobility   Bed Mobility supine-sit   Supine-Sit Antelope (Bed Mobility) verbal cues;supervision   Transfers   Transfers sit-stand transfer;bed-chair transfer   Transfer Skill: Bed to Chair/Chair to Bed   Bed-Chair Antelope (Transfers) supervision   Sit-Stand Transfer   Sit-Stand Antelope (Transfers) supervision   Balance   Balance Comments sat EOB - SBA   Activities of Daily Living   BADL Assessment/Intervention lower body dressing   Lower Body Dressing Assessment/Training   Antelope Level (Lower Body Dressing) socks;maximum assist (25% patient effort)   Clinical Impression   Criteria for Skilled Therapeutic Interventions Met (OT) Yes, treatment indicated   OT Problem List-Impairments impacting ADL problems related to;activity tolerance impaired   Assessment of Occupational Performance 1-3 Performance Deficits    Identified Performance Deficits act tolerance for ADLS   Clinical Decision Making Complexity (OT) detailed assessment/moderate complexity   Risk & Benefits of therapy have been explained care plan/treatment goals reviewed   OT Total Evaluation Time   OT Eval, Moderate Complexity Minutes (30968) 8   OT Goals   Therapy Frequency (OT) Daily   OT Predicted Duration/Target Date for Goal Attainment 12/17/23   OT Goals Hygiene/Grooming;Lower Body Dressing;Transfers   OT: Hygiene/Grooming supervision/stand-by assist   OT: Lower Body Dressing Supervision/stand-by assist   OT: Transfer Supervision/stand-by assist   Interventions   Interventions Quick Adds Self-Care/Home Management   Self-Care/Home Management   Self-Care/Home Mgmt/ADL, Compensatory, Meal Prep Minutes (29983) 8   Treatment Detail/Skilled Intervention on HF, bed to chair with SBA. Monitoring vitals throughout- sats 89-90. Chair trsf- SBA. Patient tolerated well. Instructed on basic LB exercise, neck ROM to complete while in chair today.   OT Discharge Planning   OT Plan act tolerance- cals, lb drsg, standing, cubii   OT Discharge Recommendation (DC Rec) home   OT Rationale for DC Rec anticipate could return home when patient has less 02 needs   Total Session Time   Timed Code Treatment Minutes 8   Total Session Time (sum of timed and untimed services) 16

## 2023-12-10 NOTE — PROGRESS NOTES
"Removed BiPAP AVAPS from pt per pt request. Placed HFNC 50 lpm/70 %, SpO2 91-92 %.     0840 BS diminished bilat. Start Albuterol neb inline HFNC, tolerated well, BS unchanged after. RT to monitor, titrate FiO2 as tolerated.     /76   Pulse 98   Temp 98.8  F (37.1  C) (Axillary)   Resp 15   Ht 1.6 m (5' 3\")   Wt 134 kg (295 lb 6.7 oz)   LMP  (LMP Unknown)   SpO2 92%   BMI 52.33 kg/m      VBG on BiPAP 0512:     Latest Reference Range & Units 12/10/23 05:12   Ph Venous 7.35 - 7.45  7.36   PCO2 Venous 35 - 50 mm Hg 88 (H)   PO2 Venous 25 - 47 mm Hg 50 (H)   O2 Sat, Venous 70.0 - 75.0 % 82.7 (H)   Bicarbonate Venous 24 - 30 mmol/L 49 (H)   Base Excess Venous mmol/L 23.7   Oxyhemoglobin Venous 70.0 - 75.0 % 80.6 (H)   (H): Data is abnormally high    Improved acid-base status. RT to monitor  "

## 2023-12-10 NOTE — PLAN OF CARE
"Goal Outcome Evaluation:      Plan of Care Reviewed With: patient    Problem: Adult Inpatient Plan of Care  Goal: Plan of Care Review  Description: The Plan of Care Review/Shift note should be completed every shift.  The Outcome Evaluation is a brief statement about your assessment that the patient is improving, declining, or no change.  This information will be displayed automatically on your shift  note.  Outcome: Progressing  Flowsheets (Taken 12/10/2023 5802)  Plan of Care Reviewed With: patient  Goal: Patient-Specific Goal (Individualized)  Description: You can add care plan individualizations to a care plan. Examples of Individualization might be:  \"Parent requests to be called daily at 9am for status\", \"I have a hard time hearing out of my right ear\", or \"Do not touch me to wake me up as it startles  me\".  Outcome: Progressing  Goal: Absence of Hospital-Acquired Illness or Injury  Outcome: Progressing  Intervention: Identify and Manage Fall Risk  Recent Flowsheet Documentation  Taken 12/10/2023 1200 by Lucy Moreland RN  Safety Promotion/Fall Prevention: activity supervised  Taken 12/10/2023 0800 by Lucy Moreland RN  Safety Promotion/Fall Prevention: activity supervised  Intervention: Prevent Skin Injury  Recent Flowsheet Documentation  Taken 12/10/2023 1200 by Lucy Moreland RN  Body Position: position changed independently  Taken 12/10/2023 0800 by Lucy Moreland RN  Body Position:   position maintained   position changed independently  Intervention: Prevent Infection  Recent Flowsheet Documentation  Taken 12/10/2023 1200 by Lucy Moreland RN  Infection Prevention:   rest/sleep promoted   personal protective equipment utilized  Taken 12/10/2023 0800 by Lucy Moreland RN  Infection Prevention:   rest/sleep promoted   personal protective equipment utilized  Goal: Optimal Comfort and Wellbeing  Outcome: Progressing  Intervention: Provide Person-Centered Care  Recent Flowsheet Documentation  Taken " 12/10/2023 1200 by Lucy Moreland RN  Trust Relationship/Rapport:   care explained   choices provided   empathic listening provided   questions encouraged  Taken 12/10/2023 0800 by Lucy Moreland RN  Trust Relationship/Rapport:   care explained   choices provided   empathic listening provided   questions encouraged  Goal: Readiness for Transition of Care  Outcome: Progressing   [facility] - ICU    RN Progress Note:            Pertinent Assessments:      Please refer to flowsheet rows for full assessment     Jame highflow NC, up in chair, working with PT           Key Events - This Shift:       See above        LUIS SAT (Sedation Awakening Trial): For use ONLY if intubated    SAT Safety Screen    If FAILED why?    SAT Performed    If FAILED why?               Barriers to Discharge / Downgrade:     Need for high flow O2         Point of Contact Update Pt on cell phone to family

## 2023-12-10 NOTE — PLAN OF CARE
"  Problem: Adult Inpatient Plan of Care  Goal: Plan of Care Review  Description: The Plan of Care Review/Shift note should be completed every shift.  The Outcome Evaluation is a brief statement about your assessment that the patient is improving, declining, or no change.  This information will be displayed automatically on your shift  note.  Outcome: Not Progressing  Flowsheets (Taken 12/9/2023 1940)  Plan of Care Reviewed With: patient  Goal: Patient-Specific Goal (Individualized)  Description: You can add care plan individualizations to a care plan. Examples of Individualization might be:  \"Parent requests to be called daily at 9am for status\", \"I have a hard time hearing out of my right ear\", or \"Do not touch me to wake me up as it startles  me\".  Outcome: Not Progressing  Goal: Absence of Hospital-Acquired Illness or Injury  Outcome: Not Progressing  Intervention: Identify and Manage Fall Risk  Recent Flowsheet Documentation  Taken 12/9/2023 1600 by Lucy Moreland RN  Safety Promotion/Fall Prevention: activity supervised  Taken 12/9/2023 1200 by Lucy Moreland RN  Safety Promotion/Fall Prevention: activity supervised  Taken 12/9/2023 0800 by Lucy Moreland RN  Safety Promotion/Fall Prevention: activity supervised  Intervention: Prevent Skin Injury  Recent Flowsheet Documentation  Taken 12/9/2023 1600 by Lucy Moreland RN  Body Position: position maintained  Taken 12/9/2023 1200 by Lucy Moreland RN  Body Position: position maintained  Taken 12/9/2023 0800 by Lucy Moreland RN  Body Position: position maintained  Intervention: Prevent Infection  Recent Flowsheet Documentation  Taken 12/9/2023 1600 by Lucy Moreland RN  Infection Prevention:   rest/sleep promoted   personal protective equipment utilized  Taken 12/9/2023 1200 by Lucy Moreland RN  Infection Prevention:   rest/sleep promoted   personal protective equipment utilized  Taken 12/9/2023 0800 by Lucy Moreland RN  Infection Prevention:   " rest/sleep promoted   personal protective equipment utilized  Goal: Optimal Comfort and Wellbeing  Outcome: Not Progressing  Intervention: Provide Person-Centered Care  Recent Flowsheet Documentation  Taken 12/9/2023 1600 by Lucy Moreland RN  Trust Relationship/Rapport:   care explained   choices provided   empathic listening provided   questions encouraged  Taken 12/9/2023 1200 by Lucy Moreland RN  Trust Relationship/Rapport:   care explained   choices provided   empathic listening provided   questions encouraged  Taken 12/9/2023 0800 by Lucy Moreland RN  Trust Relationship/Rapport:   care explained   choices provided   empathic listening provided   questions encouraged  Goal: Readiness for Transition of Care  Outcome: Not Progressing   Goal Outcome Evaluation:      Plan of Care Reviewed With: patient

## 2023-12-10 NOTE — PROGRESS NOTES
[facility]    Medicine Progress Note - Hospitalist Service    Date of Admission:  12/8/2023    Assessment & Plan                  Sammy Laws is a 53 year old female with morbid obesity, cad, dm came in for elective cardiac angiogram. During angiogram, was found to have severely elevated pulmonary wedge pressures, decompensated CHF.  Admitted to the ICU on BiPAP, ultimately required Bumex drip for diuresis.  Now downgrading from ICU. Hospital Day: 3         1.Acute on chronic hypercapnic and hypoxic resp failure  -high risk for not only JAMES (never had sleep study), but also hypoventilation syndrome, as well as has severe pulm htn noted on angio.  This is presumably chronic, patient had not been seeing a doctor in some time  -ABG on admit showed severe hypercarbia and acidosis- started on BiPAP in ICU but acidosis remained significant, ongoing adjustment of BiPAP settings by intensivist, requiring very high settings with AVAPS.  After diuresis, now able to tolerate high flow nasal cannula during the day, she will need AVAPS at nighttime.  -sleep study eval has been placed in discharge orders- may need something set up sooner with  home DME. This seems fairly urgent. Defer to Pulm  -Scheduled albuterol nebs     2.Acute diastolic HF  -UOP was poor with bolus dose diuretics, changed to Bumex drip and given Diuril IV with immediate improvement.  Now back on bolus dose IV Bumex  -cardiology following  -Murphy in place  -Trend BMP     3.Somnolence  -suspect was from above  -Resolved     4.CAD  -s/p cath, no stents placed due to poor hemodynamics at time of cath and need for urgent diuresis. Could benefit from stenting of mid RCA and L PDA. Has multiple other areas of disease not amenable to stenting.     5.DM  -sliding scale  -recent A1c 7.2  -hold po metformin  -cardiology considering Jardiance    6. Morbid obesity  -BMI 54  -recommend consider outpatient Bariatrics eval, this is a life  "limiting diagnosis at this point    7.HTN  -home losartan    8. Depression  -home Wellbutrin    9. Nicotine dependence  -Patch  -Requested help quitting    10.  Nonsustained V. Tach  -Attributed to PICC line, resolved after PICC line was pulled back 2 cm  -Continue to monitor on telemetry for now    K and Mg protocols       DVT Prophylaxis: High risk. Lovenox  Diet: Diet  Combination Diet Regular Diet; Moderate Consistent Carb (60 g CHO per Meal) Diet; 2 gm NA Diet    Murphy Catheter: Not present  Lines: PRESENT      PICC 12/09/23 Triple Lumen Right Cephalic Vascular access-Site Assessment: WDL      Cardiac Monitoring: ACTIVE order. Indication: Post- PCI/Angiogram (24 hours)  Code Status: Full Code      Clinically Significant Risk Factors           # Hypercalcemia: Highest Ca = 10.8 mg/dL in last 2 days, will monitor as appropriate  # Hypomagnesemia: Lowest Mg = 1.5 mg/dL in last 2 days, will replace as needed       # Hypertension: Noted on problem list    # Acute heart failure with preserved ejection fraction: heart failure noted on problem list, last echo with EF >50%, and receiving IV diuretics      # DMII: A1C = 7.2 % (Ref range: 0.0 - 5.6 %) within past 6 months, PRESENT ON ADMISSION  # Severe Obesity: Estimated body mass index is 52.33 kg/m  as calculated from the following:    Height as of this encounter: 1.6 m (5' 3\").    Weight as of this encounter: 134 kg (295 lb 6.7 oz)., PRESENT ON ADMISSION            Disposition Plan   Disposition: Home     Expected Discharge Date: 12/12/2023             Medically ready to discharge today: No         Vianey Jackson MD  Hospitalist Service  [facility]  Securely message with [employer] (more info)  Text page via [facility] Paging/Directory   ______________________________________________________________________      Physical Exam   Vital Signs: Temp: 98.8  F (37.1  C) Temp src: Axillary BP: 127/66 Pulse: 102   Resp: 21 SpO2: 92 % O2 Device: High Flow Nasal " Cannula (HFNC) Oxygen Delivery: 50 LPM  Weight: 295 lbs 6.66 oz  General: obese female sitting in bedside chair wearing HFNC. Alert and oriented x3  HEENT: Head normocephalic atraumatic, oral mucosa moist. Sclerae anicteric  CV: Regular rhythm, distant heart sounds  Resp: faint inspiratory and expiratory wheezing  GI: Belly soft, nondistended, nontender, bowel sounds present  Skin: No rashes or lesions  Extremities: 1+ pitting edema bilateral ankles  Psych: normal affect, mood euthymic. A bit blase about seriousness of diagnoses/prognosis  Neuro: Grossly normal        Medical Decision Making           Data   Recent Results (from the past 12 hour(s))   Magnesium    Collection Time: 12/10/23  5:12 AM   Result Value Ref Range    Magnesium 1.5 (L) 1.7 - 2.3 mg/dL   Blood gas venous    Collection Time: 12/10/23  5:12 AM   Result Value Ref Range    pH Venous 7.36 7.35 - 7.45    pCO2 Venous 88 (H) 35 - 50 mm Hg    pO2 Venous 50 (H) 25 - 47 mm Hg    Bicarbonate Venous 49 (H) 24 - 30 mmol/L    Base Excess/Deficit 23.7   mmol/L    Oxyhemoglobin Venous 80.6 (H) 70.0 - 75.0 %    O2 Sat, Venous 82.7 (H) 70.0 - 75.0 %   Basic metabolic panel    Collection Time: 12/10/23  5:12 AM   Result Value Ref Range    Sodium 135 135 - 145 mmol/L    Potassium 4.4 3.4 - 5.3 mmol/L    Chloride 88 (L) 98 - 107 mmol/L    Carbon Dioxide (CO2) 42 (H) 22 - 29 mmol/L    Anion Gap 5 (L) 7 - 15 mmol/L    Urea Nitrogen 9.4 6.0 - 20.0 mg/dL    Creatinine 0.47 (L) 0.51 - 0.95 mg/dL    GFR Estimate >90 >60 mL/min/1.73m2    Calcium 10.8 (H) 8.6 - 10.0 mg/dL    Glucose 104 (H) 70 - 99 mg/dL   Glucose by meter    Collection Time: 12/10/23  7:50 AM   Result Value Ref Range    GLUCOSE BY METER POCT 127 (H) 70 - 99 mg/dL   Blood gas venous    Collection Time: 12/10/23 12:07 PM   Result Value Ref Range    pH Venous 7.38 7.35 - 7.45    pCO2 Venous 84 (H) 35 - 50 mm Hg    pO2 Venous 45 25 - 47 mm Hg    Bicarbonate Venous 50 (H) 24 - 30 mmol/L    Base Excess/Deficit  24.8   mmol/L    Oxyhemoglobin Venous 76.5 (H) 70.0 - 75.0 %    O2 Sat, Venous 78.2 (H) 70.0 - 75.0 %   Magnesium    Collection Time: 12/10/23 12:07 PM   Result Value Ref Range    Magnesium 2.0 1.7 - 2.3 mg/dL   Glucose by meter    Collection Time: 12/10/23 12:10 PM   Result Value Ref Range    GLUCOSE BY METER POCT 117 (H) 70 - 99 mg/dL     Interval History   Patient is doing ok today. Now on HFNC satting 91%, sitting up in chair. Discussed her diagnosis, prognosis and treatment plan. She expressed understanding. Not sure how much insight she has. Likely multiple more days in hospital.

## 2023-12-10 NOTE — PROGRESS NOTES
Removed from BiPAP and started HFNC 50 lpm/65 %.  SpO2 95 % after. FiO2 decreased to .60. Per MD maintain SpO2 88-90 %. RT to monitor

## 2023-12-10 NOTE — PROGRESS NOTES
"[facility]   [address]  [place] [address]   [email or web address]   OFFICE: [phone]     CARDIOLOGY FOLLOW-UP NOTE      Impression and Plan     Impression:   Acute decompensated heart failure with mildly reduced LVEF. LVEF of 45%. Currently responding to diuresis.  Severe WHO group II pulmonary hypertension due to decompensated left heart failure.  Hypertension - controlled.  Severe morbid obesity with BMI 54  DMII -not on insulin.     Plan:  Bumetanide 2mg IV BID  Wean O2 as tolerated  Consider adding ozempic for management of diabetes.     Discussed with nursing staff    Primary Cardiologist: Dr. Abdelrahman Miranda    Subjective     Diuresing well. Did not respond to furosemide. Did respond well to IV chlorothiazide and bumetanide gtt. Transitioned from BiPAP to HFLN. Still with some shortness of breath but feeling better.    Cardiac Diagnostics   Telemetry (personally reviewed): sinus tachycardia    Echocardiogram (results reviewed):   Limited echo 12/8/23  Left ventricular size, wall motion and function are normal. The ejection fraction is 60-65%.  Normal right ventricle size and systolic function.  On limited evaluation, no significant valve disease is identified.  Compared to the prior study the left ventricle now appears normal in function.    Cardiac Cath (results reviewed):   R/C 12/8/23  Multivessel CAD as described below:  LM: Normal  LAD: Diffuse mild disease with a focal moderate lesion in the distal LAD.  DFR is positive (0.84) beyond the distal LAD lesion, but DFR pullback shows gradual change in DFR, with no focal \"step-up\" to suggest a benefit of PCI.  LCx: Codominant vessel with severe stenosis downstream in the L PDA.  This is a relatively small vessel at this point (roughly 2 mm in diameter).  RCA: Codominant vessel with chronic subtotal occlusion in the mid vessel, with ALVINA I flow distally.  Severe WHO II (postcapillary) pulmonary " "hypertension.  Severely elevated pulmonary capillary wedge pressure (~45 mmHg).  Normal cardiac index by Tiana.      Physical Examination       /66 (BP Location: Left arm)   Pulse 97   Temp 98.8  F (37.1  C) (Axillary)   Resp 16   Ht 1.6 m (5' 3\")   Wt 134 kg (295 lb 6.7 oz)   LMP  (LMP Unknown)   SpO2 93%   BMI 52.33 kg/m          Intake/Output Summary (Last 24 hours) at 12/10/2023 0852  Last data filed at 12/10/2023 0800  Gross per 24 hour   Intake 485.77 ml   Output 6475 ml   Net -5989.23 ml       General: pleasant, morbidly obese female. No acute distress.   HENT: external ears normal. Nares patent. Mucous membranes moist.  Eyes: perrla, extraocular muscles intact. No scleral icterus.   Neck: No JVD  Lungs: clear to auscultation  COR: fast rate, regular rhythm, No murmurs, rubs, or gallops  Abd: nondistended, BS present  Extrem: No edema         Imaging      Chest x-ray 12/9  IMPRESSION: Right-sided PICC line with tip in the distal SVC. Stable cardiomegaly with unchanged mediastinal contours. Pulmonary vascular congestion with mild interstitial edema persists. Increased opacity in the left lower lobe due to atelectasis versus edema with likely a small left pleural effusion. No pneumothorax.     Lab Results   Lab Results   Component Value Date    CHOL 149 12/08/2023    HDL 34 (L) 12/08/2023    TRIG 108 12/08/2023    BUN 9.4 12/10/2023     12/10/2023    CO2 42 (H) 12/10/2023       Lab Results   Component Value Date    WBC 8.0 12/08/2023    HGB 18.4 (H) 12/08/2023    HCT 66.3 (H) 12/08/2023    MCV 86 12/08/2023     12/08/2023       Lab Results   Component Value Date    TSH 1.47 12/08/2023               Current Inpatient Scheduled Medications   Scheduled Meds:   albuterol  2.5 mg Nebulization 4x Daily    bumetanide  1 mg Oral BID    buPROPion  150 mg Oral QAM    enoxaparin ANTICOAGULANT  40 mg Subcutaneous Q24H    insulin aspart  1-7 Units Subcutaneous TID AC    insulin aspart  1-5 Units " Subcutaneous At Bedtime    losartan  50 mg Oral Daily    [Held by provider] metFORMIN  500 mg Oral BID w/meals    nicotine  1 patch Transdermal Daily    nicotine   Transdermal Q8H    pantoprazole  40 mg Intravenous Q24H    sodium chloride (PF)  3 mL Intracatheter Q8H     Continuous Infusions:         Medications Prior to Admission   Prior to Admission medications    Medication Sig Start Date End Date Taking? Authorizing Provider   aspirin 81 MG EC tablet Take 1 tablet (81 mg) by mouth daily 11/1/23  Yes Michell Lloyd NP   buPROPion (WELLBUTRIN XL) 150 MG 24 hr tablet Take 1 tablet (150 mg) by mouth every morning 11/1/23  Yes Michell Lloyd NP   losartan (COZAAR) 50 MG tablet Take 1 tablet (50 mg) by mouth daily 11/21/23  Yes Abdelrahman Miranda MD   metFORMIN (GLUCOPHAGE XR) 500 MG 24 hr tablet Take 1 tablet (500 mg) by mouth 2 times daily (with meals) for 90 days 11/1/23 1/30/24 Yes Michell Lloyd NP   torsemide (DEMADEX) 20 MG tablet Take 2 tablets (40 mg) once daily on 12/9, 12/10, 12/11, and 12/12.  Get your labs drawn as ordered on either 12/11 or 12/12.  You will then be contacted with recommendations for what dose to take after this.  If you do not hear from our clinic by 12/13, you should plan on reducing your dose down to 1 tablet (20 mg) daily on 12/13. 12/8/23  Yes Abhi Valiente MD

## 2023-12-10 NOTE — PROGRESS NOTES
CRITICAL CARE PROGRESS NOTE:    Summary:   53 year old female active smoker with a history of severe obesity, DM2, primary HTN, chronic dyspnea and peripheral edema, presented on 12/8 for elective right and left heart catheterization, found to have severe pulmonary HTN, severely elevated PAOP, systolic and diastolic heart failure, now with worsening acute on chronic respiratory failure with hypercapnia and hypoxia requiring NIPPV.     Assessment & Plan:   Goals of Care:  Life prolonging without limits     Neurology, Psychiatry, Sedation, Analgesia:  Acute encephalopathy  Due to hypercapnia primarily. Improved, now alert, conversant with venous PCO2 in 80s which is likely baseline.  - correct acute on chronic respiratory acidosis and hypoxia with AVAPS, diuresis  - avoid opiates and benzos    Antidepressant  On bupropion, presumably for depression. Could be for tobacco dependence, but not standard dose for that. Difficult to get records, minimal outpatient follow-up.  - continue bupropion    Cardiovascular:  Acute decompensated heart failure  Severe pulmonary HTN  Severely elevated PAOP  Mixed systolic and diastolic heart failure  Likely WHO group 2/3 pulmonary HTN, with elective right heart cath with PAP 73/38 (52), PAOP severely elevated at 45-50, nonobstructive CAD, normal CI. Hypercapnia likely worsening fluid retention, high risk of JAMES/OHS. No PFTs or sleep study on record. Per significant other, John, she has had worse dyspnea over the last year, cannot walk more than a short distance without dyspnea. Likely baseline CO2 likely in the 70s. Worsening acute respiratory acidosis despite BiPAP, improved with aggressive AVAPS. [place] smokes 1 ppd, significant other smokes as well. No PFTs on record. Good UOP with bumetanide 0.5 mg/hr 12/9-12/10 and one-time dose of chlorothiazide, with net negative 8200 mL  - appreciate cardiology consultation  - continue AVAPS at night, can trial off during the day unless sleeping    - follow VBG q 8 hrs  - needs to quit smoking; nicotine patch, advised her significant other to quit also  - transitioned from bumetanide drip to IV bumetanide per cardiology  - 2000 mg daily sodium restriction  - needs outpatient PFTs  - try to qualify for home AVAPS on discharge  - will need pulmonary/sleep outpatient follow-up  - needs more regular primary care follow-up  - continue losartan if tolerated, monitor hemodynamics, renal function, electrolytes    Intermittent ventricular tachycardia  Occurred 12/9 after PICC placement when right arm raised. Lytes unremarkable. Resolved with retracting PICC 2 cm.  - monitor     Respiratory, Airway:  Acute on chronic hypercapnic and hypoxemic respiratory failure  Pulmonary hypertension  Tobacco dependence  Likely WHO group 2/3 pulmonary HTN, with elective right heart cath with PAP 73/38 (52), PAOP severely elevated at 45-50, nonobstructive CAD, normal CI. Hypercapnia likely worsening fluid retention, high risk of JAMES/OHS. No PFTs or sleep study on record. Per significant other, John, she has had worse dyspnea over the last year, cannot walk more than a short distance without dyspnea. Likely baseline CO2 in the 70s. Worsening acute respiratory acidosis despite BiPAP, improved with transition to and adjustment of AVAPS. ABG on 12/9 in AM 7/21/98/90/41 on BiPAP with FiO2 65%, with diuresis and AVAPS (target Vt 450 mL, rate 22, EPAP 10, IPAPmin 16, IPAPmax 35), VBG AM of 12/10 7.36/88. Wheezing on exam 12/10, DDx includes peribronchial edema, undiagnosed COPD, obesity related asthma or small airways obstruction.  - trial off AVAPS during the day, goal SpO2 88-92%  - AVAPS at night and as needed during the day at above settings (except will decrease rate to 16, usually overbreathes anyway)  - follow VBG q 8 hrs  - needs to quit smoking; nicotine patch, advised her significant other to quit also  - diuresis and sodium restriction as discussed above  - scheduled nebulized  albuterol for now  - no indication for systemic steroids  - needs outpatient PFTs, ideally sleep study  - try to qualify for home AVAPS on discharge  - will need pulmonary/sleep outpatient follow-up  - needs more regular primary care follow-up     Gastrointestinal:  No acute issues.  - monitor  - stop PPI with initiation of PO diet     Renal, Acid-base, Electrolytes, Volume:  Respiratory acidosis  Hypervolemia  See cath results above. Appears to be getting closer to euvolemia clinically.  - diuresis  - AVAPS  - morales, monitor I/O, BUN, Cr, weight, electrolytes     Infectious Disease:  No acute issues.  - monitor     Hematology, Oncology:  Secondary polycythemia  Chronic hypoxia-induced.  - monitor     Endocrine:  DM  A1c 7.2  - FSBG with sliding scale aspart  - hold metformin  - needs regular outpatient primary care management     Checklist:  FEN: ADAT, moderate carb, 2000 mg sodium restriction  VTE ppx: enoxaparin  GI ppx: PO diet, no PTA PPI  Bowel regimen: prn  VAP ppx: Head of bed >30 degrees while on AVAPS  Lines/tube-size:  morales placed on 12/9  PICC placed on 12/9  Skin: monitor   PT/OT/SLP, early mobility: ordered PT/OT  Code Status: full  Communication: Communicated with the patient's significant other, John, by telephone on 12/9. Communicated with the patient directly.  Disposition: The patient is stable to transfer out of ICU to cardiac telemetry. Will follow on pulmonary consult service. Case discussed with Dr. Jackson.     Restraints  Not indicated    John Roach MD  Pulmonary and Critical Care Medicine  [facility]  Office [phone]  Pager [phone]  he/him    Overnight events:  Tolerating AVAPS overnight. Alert this morning. Very good UOP response to bumetanide drip.    Subjective:  Denies pain. Endorses wheezing.    Objective:  Physical Exam:  Vent settings for last 24 hours:  FiO2 (%): 70 %  Resp: 15      /76   Pulse 98   Temp 98.8  F (37.1  C) (Axillary)   Resp 15    "Ht 1.6 m (5' 3\")   Wt 134 kg (295 lb 6.7 oz)   LMP  (LMP Unknown)   SpO2 92%   BMI 52.33 kg/m      Intake/Output last 3 shifts:  I/O last 3 completed shifts:  In: 481.77 [P.O.:300; I.V.:181.77]  Out: 6175 [Urine:6175]  Intake/Output this shift:  I/O this shift:  In: 4 [I.V.:4]  Out: 300 [Urine:300]    Physical Exam  Gen: alert, oriented, no distress on AVAPS  HEENT: NT, no GENA, oronasal mask with good seal  CV: tachy, regular, no m/g/r  Resp: bilateral diffuse expiratory wheezes but otherwise good air entry, no crackles  Abd: soft, nontender, BS+  Skin: no rashes or lesions  Ext: peripheral edema resolved  Neuro: PERRL, nonfocal exam    LAB:  Recent Labs   Lab 12/08/23  0754   WBC 8.0   HGB 18.4*   HCT 66.3*        Recent Labs   Lab 12/10/23  0512 12/09/23  2329 12/09/23  1740    135 137   CO2 42* 39* 40*   BUN 9.4 8.8 8.6       Current Outpatient Medications   Medication Sig Dispense Refill    torsemide (DEMADEX) 20 MG tablet Take 2 tablets (40 mg) once daily on 12/9, 12/10, 12/11, and 12/12.  Get your labs drawn as ordered on either 12/11 or 12/12.  You will then be contacted with recommendations for what dose to take after this.  If you do not hear from our clinic by 12/13, you should plan on reducing your dose down to 1 tablet (20 mg) daily on 12/13. 90 tablet 3       All pertinent vital signs, ventilator settings, I&Os, laboratory, microbiology, ECGs, & imaging data has been personally reviewed. Total Care time, excluding procedures, was 1 Hour    "

## 2023-12-10 NOTE — PROGRESS NOTES
"   12/10/23 1100   Appointment Info   Signing Clinician's Name / Credentials (PT) Susanna Smallwood, PT, DPT   Living Environment   People in Home significant other   Current Living Arrangements house   Home Accessibility stairs to enter home   Number of Stairs, Main Entrance 1   Stair Railings, Main Entrance none   Transportation Anticipated car, drives self   Self-Care   Usual Activity Tolerance moderate   Current Activity Tolerance moderate   Equipment Currently Used at Home none   Fall history within last six months no   Activity/Exercise/Self-Care Comment Patient is independent at baseline and works full-time.   General Information   Onset of Illness/Injury or Date of Surgery 12/08/23   Referring Physician John Roach MD   Patient/Family Therapy Goals Statement (PT) Return home   Pertinent History of Current Problem (include personal factors and/or comorbidities that impact the POC) Per chart, \"53 year old female active smoker with a history of severe obesity, DM2, primary HTN, chronic dyspnea and peripheral edema, presented on 12/8 for elective right and left heart catheterization, found to have severe pulmonary HTN, severely elevated PAOP, systolic and diastolic heart failure, now with worsening acute on chronic respiratory failure with hypercapnia and hypoxia requiring NIPPV.\"   Existing Precautions/Restrictions oxygen therapy device and L/min  (HFNC)   Range of Motion (ROM)   Range of Motion ROM is WFL   Strength (Manual Muscle Testing)   Strength (Manual Muscle Testing) strength is WFL   Bed Mobility   Comment, (Bed Mobility) Patient encountered in recliner and returned to recliner at end of session.   Transfers   Transfers sit-stand transfer   Sit-Stand Transfer   Sit-Stand Iron (Transfers) set up;supervision;1 person to manage equipment   Assistive Device (Sit-Stand Transfers) other (see comments)  (None)   Gait/Stairs (Locomotion)   Iron Level (Gait) set up;supervision;1 person to " manage equipment   Assistive Device (Gait) other (see comments)  (None)   Comment, (Gait/Stairs) Stairs not tested due to lines.   Balance   Balance no deficits were identified   Clinical Impression   Criteria for Skilled Therapeutic Intervention Yes, treatment indicated   PT Diagnosis (PT) Impaired functional mobility   Influenced by the following impairments Decreased endurance, respiratory needs   Functional limitations due to impairments Transfers, gait, stairs   Clinical Presentation (PT Evaluation Complexity) evolving   Clinical Presentation Rationale Patient presents as medically diagnosed.   Clinical Decision Making (Complexity) moderate complexity   Planned Therapy Interventions (PT) gait training;home exercise program;patient/family education;stair training;strengthening;transfer training;progressive activity/exercise;home program guidelines   Risk & Benefits of therapy have been explained evaluation/treatment results reviewed;care plan/treatment goals reviewed;patient   PT Total Evaluation Time   PT Eval, Moderate Complexity Minutes (75984) 10   Physical Therapy Goals   PT Frequency Daily   PT Predicted Duration/Target Date for Goal Attainment 12/17/23   PT Goals Transfers;Gait;Stairs   PT: Transfers Independent;Sit to/from stand   PT: Gait Independent;150 feet   PT: Stairs Independent;1 stair   Interventions   Interventions Quick Adds Therapeutic Procedure   Therapeutic Procedure/Exercise   Ther. Procedure: strength, endurance, ROM, flexibillity Minutes (58337) 10   Symptoms Noted During/After Treatment fatigue   Treatment Detail/Skilled Intervention Patient participated in 10 minutes of continuous standing activity, including marching in place, mini squats, and static standing. SnO2 88-90% on HFNC. Patient complained of her feet hurting but denied SOB.   PT Discharge Planning   PT Plan Activity tolerance, LE bike, gait and stairs as able   PT Discharge Recommendation (DC Rec) home   PT Rationale for DC  Rec Patient is mobilizing with SBA but demonstrates limited endurance. Anticipate patient will progress with endurance/mobility and be able to return home at discharge.   PT Brief overview of current status SBA for standing activity.   PT Equipment Needed at Discharge other (see comments)  (None)   Total Session Time   Timed Code Treatment Minutes 10   Total Session Time (sum of timed and untimed services) 20

## 2023-12-10 NOTE — PLAN OF CARE
[facility] - ICU    RN Progress Note:            Pertinent Assessments:      Please refer to flowsheet rows for full assessment     Sammy was on BiPAP throughout the night.  She tolerated this fairly well but the moves the mask frequently and loosens the seal.  On HiFlo during the day.  Bumex gttp remains on with good urine output and good decent weight loss noted.              Key Events - This Shift:       Venous blood gas pH improved throughout the night and oxygen requirement are decreased from yesterday.        LUIS SAT (Sedation Awakening Trial): For use ONLY if intubated    SAT Safety Screen Not Applicable   If FAILED why?    SAT Performed Not Applicable   If FAILED why?               Barriers to Discharge / Downgrade:     Oxygen requirements

## 2023-12-10 NOTE — PROGRESS NOTES
12/10/23 0504   Tech Time   $Tech Time (10 minute increments) 2   Mode: CPAP/ BiPAP/ AVAPS/ AVAPS AE   CPAP/BiPAP/ AVAPS/ AVAPS AE Mode AVAPS   Equipment   Device V60   CPAP/BiPAP/Settings   $CPAP/BiPAP Subsequent completed   BIPAP/CPAP On Standby On   Oxygen (%) 30   AVAPS Settings   Min P (cmH20) AVAPS 12   Max P (cmH20) AVAPS 35   EPAP cmH20 Bilevel (Res Med) 10   Target VT (mL) AVAPS 450   Set Rate (breath/min) 22 breath/min   Timed Inspiration (Sec) 0.9   Rise Time 3   AVAPS Patient Parameters   Respiratory Rate Total (breaths/min) 30   Vte (mL) 444   Peak Inspiratory Pressure (cm H2O) 19   Minute Ventilation (L/min) 12   Total Leak 14   CPAP/BiPAP/AVAPS/AVAPS AE Alarms   High Pressure (cm H2O) 25 cmH2O   Low Pressure (cm H2O) 8   Low Pressure Delay (sec) 20 sec   Lo Min Vent 3   High Rate (breaths/min) 40 breaths/min   Low Rate (breaths/min) 10   Audible Alarm set at (Volume of Alarm) 7   Humidifier Checked N/A   RT Device Skin Assessment   Oxygen Delivery Device CPAP/BiPAP Mask   Interface Face Mask - Medium   Ventilator Arm In Place No   Site Appearance neck circumference Clean and dry   Site Appearance nares (outer) Clean and dry   Site Appearance nares (inner) Clean and dry   Site Appearance bridge of nose Clean and dry   Site Appearance occiput Clean and dry   Strap Tightness Finger Allowance between head and device strap   Device Skin Interventions Taken No adjustments needed

## 2023-12-10 NOTE — PROGRESS NOTES
"HFNC 50 lpm/70 %, SpO2 90 %. BS diminished bases, E/wheezes YARIEL, crackles RLL. Albuterol tx given/inline. Tolerated well. Sitting up in chair. BS after tx diminished bilat, wheezes cleared    /76   Pulse 98   Temp 98.8  F (37.1  C) (Axillary)   Resp 21   Ht 1.6 m (5' 3\")   Wt 134 kg (295 lb 6.7 oz)   LMP  (LMP Unknown)   SpO2 90%   BMI 52.33 kg/m      RT to monitor, titrate O2 as tolerated  "

## 2023-12-11 ENCOUNTER — TELEPHONE (OUTPATIENT)
Dept: CARDIOLOGY | Facility: CLINIC | Age: 53
End: 2023-12-11
Payer: COMMERCIAL

## 2023-12-11 ENCOUNTER — APPOINTMENT (OUTPATIENT)
Dept: OCCUPATIONAL THERAPY | Facility: HOSPITAL | Age: 53
DRG: 286 | End: 2023-12-11
Attending: INTERNAL MEDICINE
Payer: COMMERCIAL

## 2023-12-11 ENCOUNTER — TELEPHONE (OUTPATIENT)
Dept: FAMILY MEDICINE | Facility: CLINIC | Age: 53
End: 2023-12-11
Payer: COMMERCIAL

## 2023-12-11 DIAGNOSIS — I50.9 ACUTE DECOMPENSATED HEART FAILURE (H): Primary | ICD-10-CM

## 2023-12-11 LAB
ALLEN'S TEST: YES
ANION GAP SERPL CALCULATED.3IONS-SCNC: 3 MMOL/L (ref 7–15)
BASE EXCESS BLDA CALC-SCNC: 23.1 MMOL/L
BASE EXCESS BLDV CALC-SCNC: 23.9 MMOL/L
BASE EXCESS BLDV CALC-SCNC: 24.1 MMOL/L
BUN SERPL-MCNC: 15.5 MG/DL (ref 6–20)
CALCIUM SERPL-MCNC: 11.3 MG/DL (ref 8.6–10)
CHLORIDE SERPL-SCNC: 88 MMOL/L (ref 98–107)
COHGB MFR BLD: 93.4 % (ref 96–97)
CREAT SERPL-MCNC: 0.47 MG/DL (ref 0.51–0.95)
DEPRECATED HCO3 PLAS-SCNC: 46 MMOL/L (ref 22–29)
EGFRCR SERPLBLD CKD-EPI 2021: >90 ML/MIN/1.73M2
FLOW: 50 LPM
GLUCOSE BLDC GLUCOMTR-MCNC: 142 MG/DL (ref 70–99)
GLUCOSE BLDC GLUCOMTR-MCNC: 189 MG/DL (ref 70–99)
GLUCOSE BLDC GLUCOMTR-MCNC: 209 MG/DL (ref 70–99)
GLUCOSE BLDC GLUCOMTR-MCNC: 222 MG/DL (ref 70–99)
GLUCOSE SERPL-MCNC: 111 MG/DL (ref 70–99)
HCO3 BLD-SCNC: 48 MMOL/L (ref 23–29)
HCO3 BLDV-SCNC: 49 MMOL/L (ref 24–30)
HCO3 BLDV-SCNC: 50 MMOL/L (ref 24–30)
MAGNESIUM SERPL-MCNC: 1.8 MG/DL (ref 1.7–2.3)
O2/TOTAL GAS SETTING VFR VENT: 0.6 %
OXYHGB MFR BLD: 91.9 % (ref 96–97)
OXYHGB MFR BLDV: 71.7 % (ref 70–75)
OXYHGB MFR BLDV: 84.4 % (ref 70–75)
PCO2 BLD: 73 MM HG (ref 35–45)
PCO2 BLDV: 79 MM HG (ref 35–50)
PCO2 BLDV: >98 MM HG (ref 35–50)
PH BLD: 7.42 [PH] (ref 7.37–7.44)
PH BLDV: 7.32 [PH] (ref 7.35–7.45)
PH BLDV: 7.4 [PH] (ref 7.35–7.45)
PO2 BLD: 69 MM HG (ref 80–90)
PO2 BLDV: 40 MM HG (ref 25–47)
PO2 BLDV: 57 MM HG (ref 25–47)
POTASSIUM SERPL-SCNC: 3.7 MMOL/L (ref 3.4–5.3)
SAO2 % BLDV: 73.1 % (ref 70–75)
SAO2 % BLDV: 85.9 % (ref 70–75)
SODIUM SERPL-SCNC: 137 MMOL/L (ref 135–145)
TEMPERATURE: 37 DEGREES C
VENTILATION MODE: ABNORMAL

## 2023-12-11 PROCEDURE — 82805 BLOOD GASES W/O2 SATURATION: CPT | Performed by: HOSPITALIST

## 2023-12-11 PROCEDURE — 250N000011 HC RX IP 250 OP 636: Mod: JZ | Performed by: INTERNAL MEDICINE

## 2023-12-11 PROCEDURE — 999N000157 HC STATISTIC RCP TIME EA 10 MIN

## 2023-12-11 PROCEDURE — 83735 ASSAY OF MAGNESIUM: CPT | Performed by: INTERNAL MEDICINE

## 2023-12-11 PROCEDURE — 99207 PR CDG-CUT & PASTE-POTENTIAL IMPACT ON LEVEL: CPT | Performed by: HOSPITALIST

## 2023-12-11 PROCEDURE — 250N000013 HC RX MED GY IP 250 OP 250 PS 637: Performed by: INTERNAL MEDICINE

## 2023-12-11 PROCEDURE — 94640 AIRWAY INHALATION TREATMENT: CPT | Mod: 76

## 2023-12-11 PROCEDURE — 99233 SBSQ HOSP IP/OBS HIGH 50: CPT | Performed by: INTERNAL MEDICINE

## 2023-12-11 PROCEDURE — 250N000009 HC RX 250: Performed by: INTERNAL MEDICINE

## 2023-12-11 PROCEDURE — 120N000013 HC R&B IMCU

## 2023-12-11 PROCEDURE — 94640 AIRWAY INHALATION TREATMENT: CPT

## 2023-12-11 PROCEDURE — 97110 THERAPEUTIC EXERCISES: CPT | Mod: GO

## 2023-12-11 PROCEDURE — 99233 SBSQ HOSP IP/OBS HIGH 50: CPT | Performed by: HOSPITALIST

## 2023-12-11 PROCEDURE — 80048 BASIC METABOLIC PNL TOTAL CA: CPT | Performed by: INTERNAL MEDICINE

## 2023-12-11 PROCEDURE — 97535 SELF CARE MNGMENT TRAINING: CPT | Mod: GO

## 2023-12-11 PROCEDURE — 250N000013 HC RX MED GY IP 250 OP 250 PS 637: Performed by: HOSPITALIST

## 2023-12-11 PROCEDURE — 82805 BLOOD GASES W/O2 SATURATION: CPT | Performed by: INTERNAL MEDICINE

## 2023-12-11 PROCEDURE — 99232 SBSQ HOSP IP/OBS MODERATE 35: CPT | Performed by: INTERNAL MEDICINE

## 2023-12-11 RX ORDER — MAGNESIUM OXIDE 400 MG/1
400 TABLET ORAL EVERY 4 HOURS
Qty: 2 TABLET | Refills: 0 | Status: COMPLETED | OUTPATIENT
Start: 2023-12-11 | End: 2023-12-11

## 2023-12-11 RX ORDER — POTASSIUM CHLORIDE 1500 MG/1
20 TABLET, EXTENDED RELEASE ORAL ONCE
Qty: 1 TABLET | Refills: 0 | Status: COMPLETED | OUTPATIENT
Start: 2023-12-11 | End: 2023-12-11

## 2023-12-11 RX ORDER — BUMETANIDE 0.25 MG/ML
2 INJECTION INTRAMUSCULAR; INTRAVENOUS EVERY 8 HOURS
Status: DISCONTINUED | OUTPATIENT
Start: 2023-12-11 | End: 2023-12-11

## 2023-12-11 RX ADMIN — MAGNESIUM OXIDE TAB 400 MG (241.3 MG ELEMENTAL MG) 400 MG: 400 (241.3 MG) TAB at 12:13

## 2023-12-11 RX ADMIN — LOSARTAN POTASSIUM 50 MG: 50 TABLET, FILM COATED ORAL at 09:31

## 2023-12-11 RX ADMIN — NICOTINE 1 PATCH: 21 PATCH, EXTENDED RELEASE TRANSDERMAL at 16:06

## 2023-12-11 RX ADMIN — ALBUTEROL SULFATE 2.5 MG: 2.5 SOLUTION RESPIRATORY (INHALATION) at 16:50

## 2023-12-11 RX ADMIN — ALBUTEROL SULFATE 2.5 MG: 2.5 SOLUTION RESPIRATORY (INHALATION) at 20:24

## 2023-12-11 RX ADMIN — BUPROPION HYDROCHLORIDE 150 MG: 150 TABLET, FILM COATED, EXTENDED RELEASE ORAL at 09:31

## 2023-12-11 RX ADMIN — BUMETANIDE 2 MG: 0.25 INJECTION INTRAMUSCULAR; INTRAVENOUS at 09:05

## 2023-12-11 RX ADMIN — POTASSIUM CHLORIDE 20 MEQ: 1500 TABLET, EXTENDED RELEASE ORAL at 09:31

## 2023-12-11 RX ADMIN — ENOXAPARIN SODIUM 40 MG: 40 INJECTION SUBCUTANEOUS at 21:12

## 2023-12-11 RX ADMIN — ALBUTEROL SULFATE 2.5 MG: 2.5 SOLUTION RESPIRATORY (INHALATION) at 07:56

## 2023-12-11 RX ADMIN — BUMETANIDE 1 MG/HR: 0.25 INJECTION INTRAMUSCULAR; INTRAVENOUS at 15:38

## 2023-12-11 RX ADMIN — ALBUTEROL SULFATE 2.5 MG: 2.5 SOLUTION RESPIRATORY (INHALATION) at 12:11

## 2023-12-11 RX ADMIN — ENOXAPARIN SODIUM 40 MG: 40 INJECTION SUBCUTANEOUS at 09:31

## 2023-12-11 RX ADMIN — MAGNESIUM OXIDE TAB 400 MG (241.3 MG ELEMENTAL MG) 400 MG: 400 (241.3 MG) TAB at 09:31

## 2023-12-11 ASSESSMENT — ACTIVITIES OF DAILY LIVING (ADL)
ADLS_ACUITY_SCORE: 32
ADLS_ACUITY_SCORE: 26
ADLS_ACUITY_SCORE: 32
ADLS_ACUITY_SCORE: 32

## 2023-12-11 NOTE — PROGRESS NOTES
Care Management Follow Up    Length of Stay (days): 3    Expected Discharge Date: 12/13/2023     Concerns to be Addressed:       Patient plan of care discussed at interdisciplinary rounds: Yes    Anticipated Discharge Disposition:       Anticipated Discharge Services:    Anticipated Discharge DME:      Patient/family educated on Medicare website which has current facility and service quality ratings:    Education Provided on the Discharge Plan:    Patient/Family in Agreement with the Plan:      Referrals Placed by CM/SW:    Private pay costs discussed: Not applicable    Additional Information:  Chart reviewed. CM following for needs. Anticipate discharge home when medically ready     3:51 PM  Patient getting calls to set up outpatient sleep study but she doesn't know when she is discharging. Has been talking to Nurys. CM will call tomorrow to help coordinate [phone]    Ghada Warren RN

## 2023-12-11 NOTE — TELEPHONE ENCOUNTER
Pt needs to be scheduled for a sleep consult... Spoke with the patient and she is going to be in the hospital for some time so would like to have the consult in the hospital.     Spoke with pt again and informed that [facility] does not have a doctor that does consults in the hospital and she should reach out to the hospital care coordinator with help in scheduling for a consult in the hospital.

## 2023-12-11 NOTE — PROGRESS NOTES
[facility]    Medicine Progress Note - Hospitalist Service    Date of Admission:  12/8/2023    Assessment & Plan                    aSmmy Laws is a 53 year old female with morbid obesity, cad, dm came in for elective cardiac angiogram. During angiogram, was found to have severely elevated pulmonary wedge pressures, decompensated CHF.  Admitted to the ICU on BiPAP, ultimately required Bumex drip for diuresis.  downgraded from ICU 12/10. Remains in somewhat tenuous condition with inadequate diuresis and worse CO2 retention this morning. Hospital Day: 4         1.Acute on chronic hypercapnic and hypoxic resp failure  -high risk for not only JAMES (never had sleep study), but also hypoventilation syndrome, as well as has severe pulm htn noted on angio.  This is presumably chronic, patient had not been seeing a doctor in some time  -ABG on admit showed severe hypercarbia and acidosis- started on BiPAP in ICU but acidosis remained significant, ongoing adjustment of BiPAP settings by intensivist, requiring very high settings with AVAPS.  After diuresis, now able to tolerate high flow nasal cannula during the day, she will need AVAPS at nighttime.  -sleep study eval has been placed in discharge orders- may need something set up sooner with [facility] home DME. This seems fairly urgent. Defer to Pulm  -Scheduled albuterol nebs  -VBG again in AM     2.Acute diastolic HF  -UOP was poor with bolus dose diuretics, changed to Bumex drip and given Diuril IV with immediate improvement.  UOP worse on bolus dose Bumex, today cardiology increasing dose, may need to go back on drip.  -cardiology following  -Murphy in place  -Trend BMP     3.Somnolence  -due to hypercarbia  -monitor as indicator of adequacy of NIPPV     4.CAD  -s/p cath, no stents placed due to poor hemodynamics at time of cath and need for urgent diuresis. Could benefit from stenting of mid RCA and L PDA. Has multiple other areas of disease not  "amenable to stenting.     5.DM  -sliding scale  -recent A1c 7.2  -hold po metformin  -cardiology considering Jardiance or Ozempic, no objection to either from my standpoint    6. Morbid obesity  -BMI 54  -recommend consider outpatient Bariatrics eval, this is a life limiting diagnosis at this point    7.HTN  -home losartan    8. Depression  -home Wellbutrin    9. Nicotine dependence  -Patch  -Requested help quitting    10.  Nonsustained V. Tach  -Attributed to PICC line, resolved after PICC line was pulled back 2 cm  -Continue to monitor on telemetry for now    K and Mg protocols       DVT Prophylaxis: High risk. Lovenox  Diet: Diet  Combination Diet Regular Diet; Moderate Consistent Carb (60 g CHO per Meal) Diet; 2 gm NA Diet    Murphy Catheter: Not present  Lines: PRESENT      PICC 12/09/23 Triple Lumen Right Brachial vein medial Vascular access-Site Assessment: WDL      Cardiac Monitoring: ACTIVE order. Indication: Tachyarrhythmias, acute (48 hours)  Code Status: Full Code      Clinically Significant Risk Factors           # Hypercalcemia: Highest Ca = 11.3 mg/dL in last 2 days, will monitor as appropriate  # Hypomagnesemia: Lowest Mg = 1.5 mg/dL in last 2 days, will replace as needed       # Hypertension: Noted on problem list    # Acute heart failure with preserved ejection fraction: heart failure noted on problem list, last echo with EF >50%, and receiving IV diuretics      # DMII: A1C = 7.2 % (Ref range: 0.0 - 5.6 %) within past 6 months, PRESENT ON ADMISSION  # Severe Obesity: Estimated body mass index is 51.78 kg/m  as calculated from the following:    Height as of this encounter: 1.6 m (5' 3\").    Weight as of this encounter: 132.6 kg (292 lb 5.3 oz)., PRESENT ON ADMISSION            Disposition Plan   Disposition: Home    Expected Discharge Date: 12/13/2023        Discharge Comments: will need AVAPS machine for home     Medically ready to discharge today: No         Vianey Jackson MD  Hospitalist Service  M " [facility]  Securely message with Eber (more info)  Text page via [facility] Paging/Directory   ______________________________________________________________________      Physical Exam   Vital Signs: Temp: 98.5  F (36.9  C) Temp src: Oral BP: 93/57 Pulse: 96   Resp: 21 SpO2: 90 % O2 Device: BiPAP/CPAP Oxygen Delivery: 50 LPM  Weight: 292 lbs 5.28 oz  General: obese female sleeping with BiPAP mask on. Rouses to voice, falls back asleep  HEENT: Head normocephalic atraumatic, oral mucosa moist. Sclerae anicteric  Skin: No rashes or lesions  Extremities: 1+ pitting edema bilateral ankles  Psych: drowsy today  Neuro: drowsy today        Medical Decision Making           Data   Recent Results (from the past 12 hour(s))   Basic metabolic panel    Collection Time: 12/11/23  5:27 AM   Result Value Ref Range    Sodium 137 135 - 145 mmol/L    Potassium 3.7 3.4 - 5.3 mmol/L    Chloride 88 (L) 98 - 107 mmol/L    Carbon Dioxide (CO2) 46 (HH) 22 - 29 mmol/L    Anion Gap 3 (L) 7 - 15 mmol/L    Urea Nitrogen 15.5 6.0 - 20.0 mg/dL    Creatinine 0.47 (L) 0.51 - 0.95 mg/dL    GFR Estimate >90 >60 mL/min/1.73m2    Calcium 11.3 (H) 8.6 - 10.0 mg/dL    Glucose 111 (H) 70 - 99 mg/dL   Blood gas venous    Collection Time: 12/11/23  5:27 AM   Result Value Ref Range    pH Venous 7.32 (L) 7.35 - 7.45    pCO2 Venous >98 (H) 35 - 50 mm Hg    pO2 Venous 57 (H) 25 - 47 mm Hg    Bicarbonate Venous 50 (H) 24 - 30 mmol/L    Base Excess/Deficit 24.1   mmol/L    Oxyhemoglobin Venous 84.4 (H) 70.0 - 75.0 %    O2 Sat, Venous 85.9 (H) 70.0 - 75.0 %   Magnesium    Collection Time: 12/11/23  5:27 AM   Result Value Ref Range    Magnesium 1.8 1.7 - 2.3 mg/dL   Glucose by meter    Collection Time: 12/11/23  8:32 AM   Result Value Ref Range    GLUCOSE BY METER POCT 142 (H) 70 - 99 mg/dL   Blood gas venous    Collection Time: 12/11/23 10:07 AM   Result Value Ref Range    pH Venous 7.40 7.35 - 7.45    pCO2 Venous 79 (H) 35 - 50 mm Hg    pO2  Venous 40 25 - 47 mm Hg    Bicarbonate Venous 49 (H) 24 - 30 mmol/L    Base Excess/Deficit 23.9   mmol/L    Oxyhemoglobin Venous 71.7 70.0 - 75.0 %    O2 Sat, Venous 73.1 70.0 - 75.0 %   Glucose by meter    Collection Time: 12/11/23  1:02 PM   Result Value Ref Range    GLUCOSE BY METER POCT 189 (H) 70 - 99 mg/dL   Glucose by meter    Collection Time: 12/11/23  3:58 PM   Result Value Ref Range    GLUCOSE BY METER POCT 222 (H) 70 - 99 mg/dL     Interval History   Patient had a rough night, struggling with the BiPAP machine.  This morning VBG looking worse, patient is more drowsy.  Seems to have a good seal with the BiPAP mask now and she is waking up a little bit.  Will recheck VBG in 1 hour to ensure improvement.

## 2023-12-11 NOTE — PROGRESS NOTES
PULMONARY MEDICINE PROGRESS NOTE  12/11/2023    Admit Date: 12/8/2023  CODE: Full Code    Reason for Consult: acute on chronic respiratory failure with hypercapnia and hypoxia, tobacco dependence, severe pulmonary hypertension, acute decompensated right- and left-sided heart failure, CAD    Assessment/Plan:   53 year old female active smoker with a history of lack of regular medical care, severe obesity, DM2, primary HTN, chronic dyspnea and peripheral edema, presented on 12/8 for elective right and left heart catheterization, found to have severe pulmonary HTN, severely elevated PAOP, CAD, diastolic heart failure, now with worsening acute on chronic respiratory failure with hypercapnia and hypoxia requiring NIPPV.    Acute on chronic respiratory failure with hypercapnia and hypoxia, tobacco dependence, severe pulmonary hypertension, acute decompensated right- and left-sided systolic and diastolic heart failure, CAD: Likely WHO group 2/3 pulmonary HTN, with elective right heart cath with PAP 73/38 (52), PAOP severely elevated at 45-50, multivessel CAD with plan for PCI but first requires stabilization, normal CI. EF normal on TTE 12/9. Hypercapnia likely worsening fluid retention, high likelihood of undiagnosed severe JAMES/OHS, high risk of COPD. No PFTs or sleep study on record. Per significant other, John and the patient, she has had worse dyspnea over the last year, cannot walk more than a short distance without dyspnea, and did not have regular primary care prior to these symptoms developing. Very unhealthy lifestyle with smoking 1 ppd, severe obesity. Significant other also smokes. Likely baseline CO2 likely in the 80s. Worsening acute respiratory acidosis despite BiPAP, improved with aggressive AVAPS and bumetanide drip, now intermittent IV bumetanide. Good UOP with bumetanide 0.5 mg/hr 12/9-12/10 and one-time dose of chlorothiazide, then transitioned to intermittent IV bumetanide, increased  "12/11.    Plan:  - appreciate cardiology consultation  - bumetanide increased today  - agree that she would benefit from eventual GLP-1 agonist  - continue AVAPS at night and as needed during the day; CO2 creeping up again despite aggressive AVAPS  - scheduled nebulized albuterol  - HFNC, wean as tolerated  - unclear indication for systemic steroids; could consider if failing to improve with trade-off of hyperglycemia, possible increased fluid retention  - Debbi, RT with [facility], is reviewing her records to see if we can qualify her for a home device on discharge; much appreciated  - PT/OT, aggressive IS  - eventual PCI at the discretion of cardiology  - will need outpatient sleep medicine and pulmonary follow-up; I will place these referrals  - patient must quit smoking; on nicotine patch  - advised significant other to also quit smoking  - will follow    John Roach MD  Pulmonary and Critical Care Medicine  [facility]  Vocera  Office [phone]  Pager [phone]  he/him                                                                                                                                                        SUBJECTIVE/INTERVAL HISTORY   Up in chair. Awake but a bit somnolent. Breathing feels improved. On HFNC currently. Respiratory acidosis worsens off BiPAP.                                                                                                                                                      Exam/Data:     Vitals  /69 (BP Location: Right arm)   Pulse 103   Temp 98.5  F (36.9  C) (Axillary)   Resp 24   Ht 1.6 m (5' 3\")   Wt 132.6 kg (292 lb 5.3 oz)   LMP  (LMP Unknown)   SpO2 94%   BMI 51.78 kg/m    BP - Mean:  [60-93] 89  I/O last 3 completed shifts:  In: 327.33 [P.O.:300; I.V.:27.33]  Out: 1500 [Urine:1500]  Weight change: -1.4 kg (-3 lb 1.4 oz)  [unfilled]  EXAM:  /69 (BP Location: Right arm)   Pulse 103   Temp 98.5  F (36.9 " " C) (Axillary)   Resp 24   Ht 1.6 m (5' 3\")   Wt 132.6 kg (292 lb 5.3 oz)   LMP  (LMP Unknown)   SpO2 94%   BMI 51.78 kg/m      Intake/Output last 3 shifts:  I/O last 3 completed shifts:  In: 327.33 [P.O.:300; I.V.:27.33]  Out: 1500 [Urine:1500]  Intake/Output this shift:  I/O this shift:  In: -   Out: 500 [Urine:500]    Physical Exam  Gen: alert, oriented, no distress  HEENT: NT, no GENA  CV: tachy, regular, no m/g/r  Resp: diminished bilaterally, no audible wheezing today  Abd: soft, nontender, BS+  Skin: no rashes or lesions  Ext: leg edema resolved  Neuro: PERRL, nonfocal exam, a bit somnolent but conversant    ROS: A complete 10-system review of systems was obtained and is negative with the exception of what is noted in the history of present illness.    Medications:      albuterol  2.5 mg Nebulization 4x Daily    bumetanide  2 mg Intravenous Q8H    buPROPion  150 mg Oral QAM    enoxaparin ANTICOAGULANT  40 mg Subcutaneous Q12H    insulin aspart  1-7 Units Subcutaneous TID AC    losartan  50 mg Oral Daily    magnesium oxide  400 mg Oral Q4H    nicotine  1 patch Transdermal Daily    nicotine   Transdermal Q8H    sodium chloride (PF)  3 mL Intracatheter Q8H         DATA  All laboratory and radiology has been personally reviewed by myself today.  Recent Labs   Lab 12/08/23  0754   WBC 8.0   HGB 18.4*   HCT 66.3*        Recent Labs   Lab 12/11/23  0527 12/10/23  0512 12/09/23  2329    135 135   CO2 46* 42* 39*   BUN 15.5 9.4 8.8       Imaging:  CXR (December 2023):  - images directly reviewed, formal interpretation follows:  IMPRESSION: Right-sided PICC line with tip in the distal SVC. Stable cardiomegaly with unchanged mediastinal contours. Pulmonary vascular congestion with mild interstitial edema persists. Increased opacity in the left lower lobe due to atelectasis versus edema with likely a small left pleural effusion. No pneumothorax.    TTE (December 2023):  Left ventricular size, wall " motion and function are normal. The ejection  fraction is 60-65%.  Normal right ventricle size and systolic function.  On limited evaluation, no significant valve disease is identified.  Compared to the prior study the left ventricle now appears normal in function.    Pulmonary Function Testing  none

## 2023-12-11 NOTE — TELEPHONE ENCOUNTER
Reason for call:  Other   Patient called regarding (reason for call): appointment and call back  Additional comments:   Patient is Inpatient currently and needs Sleep Eval asap per Dr Landry. See Referral. Please reach out to patient regarding this.    Phone number to reach patient:  Cell number on file:    Telephone Information:   Mobile [phone]       Best Time:  any    Can we leave a detailed message on this number?  YES    Travel screening: Not Applicable

## 2023-12-11 NOTE — TELEPHONE ENCOUNTER
Pro NT BNP order cancelled per Dr Valiente's message below.  Pt follows with Dr Miranda, at [facility] in [place].  She was last seen by him on 11/21/23.          RE: Call pt  Received: Today  Abhi Valiente MD Miller, Amy K, RN  Roger Vee,    We can cancel the order and have her follow up with her primary cardiologist after discharge.    Thanks,  Alexander          Previous Messages       ----- Message -----  From: Nanda Simmons, RN  Sent: 12/11/2023   2:12 PM CST  To: Abhi Valiente MD  Subject: RE: Call pt                                      Hi Dr Valiente,    She is still IP, on BiPap last night, HHF today, and on IV push Bumex q8h.  I see your lab order was for an NT Pro BNP.  Do you want to have this drawn at some point following hospital discharge, or cancel the order?    Thank you,  Nanda    ----- Message -----  From: Nanda Simmons RN  Sent: 12/11/2023  12:00 AM CST  To: [facility] Heart Team 1  Subject: Call pt                                          Call pt to see if she got her labs drawn.      ----- Message -----  From: Abhi Valiente MD  Sent: 12/8/2023  11:39 AM CST  To: [facility] Heart Team 1    Hi all,    Would you mind following up on this patient on Monday or Tuesday of next week to make sure she gets her labs drawn?  Just want to make sure it doesn't slip through the cracks since this was a [place]'s cath for me.    Thanks,  Alexander

## 2023-12-11 NOTE — PROGRESS NOTES
Respiratory Care Note    Pt remains on HHFNC 50L60%, spO2 in the low 90s. AVAPS to use while sleeping: R18, 450mL, 35-12, +10, 65%. BS are diminished with slight expiratory wheeze. JJ=158, completed with good effort. Albuterol completed as scheduled. PFT completed, best FVC=.65. RT will continue to follow.     Princess Moy, RT

## 2023-12-11 NOTE — PROGRESS NOTES
Patient on HFNC during the day- 50 lpm, 60% FiO2, received neb as schedule, BS diminished. Patient placed on BIPAP overnight to rest on AVAPS mode. RT will follow.

## 2023-12-11 NOTE — PROGRESS NOTES
"[facility] HEART [facility]   [address]  [place] [address]   [email or web address]   OFFICE: [phone]     CARDIOLOGY FOLLOW-UP NOTE      Impression and Plan     Impression:   Acute decompensated heart failure with mildly reduced LVEF. LVEF of 45%. Diuresis slowed down; will need to intensify.  Severe WHO group II pulmonary hypertension due to decompensated left heart failure.  Hypertension - controlled.  Severe morbid obesity with BMI 54  DMII -not on insulin.     Plan:  Increase Bumetanide 2mg IV q8h. If UOP not >1L with each dose of bumex then would resume bumex gtt at 2 mg/hr.  Wean O2 as tolerated  Consider adding ozempic for management of diabetes.       Primary Cardiologist: Dr. Abdelrahman Miranda    Subjective     Remains on BiPAP or HFNC. Sleeping comfortably on BiPAP    Cardiac Diagnostics   Telemetry (personally reviewed): sinus tachycardia    Echocardiogram (results reviewed):   Limited echo 12/8/23  Left ventricular size, wall motion and function are normal. The ejection fraction is 60-65%.  Normal right ventricle size and systolic function.  On limited evaluation, no significant valve disease is identified.  Compared to the prior study the left ventricle now appears normal in function.    Cardiac Cath (results reviewed):   R/C 12/8/23  Multivessel CAD as described below:  LM: Normal  LAD: Diffuse mild disease with a focal moderate lesion in the distal LAD.  DFR is positive (0.84) beyond the distal LAD lesion, but DFR pullback shows gradual change in DFR, with no focal \"step-up\" to suggest a benefit of PCI.  LCx: Codominant vessel with severe stenosis downstream in the L PDA.  This is a relatively small vessel at this point (roughly 2 mm in diameter).  RCA: Codominant vessel with chronic subtotal occlusion in the mid vessel, with ALVINA I flow distally.  Severe WHO II (postcapillary) pulmonary hypertension.  Severely elevated pulmonary capillary wedge pressure (~45 " "mmHg).  Normal cardiac index by Tiana.      Physical Examination       /72   Pulse 105   Temp 98.3  F (36.8  C) (Oral)   Resp 30   Ht 1.6 m (5' 3\")   Wt 132.6 kg (292 lb 5.3 oz)   LMP  (LMP Unknown)   SpO2 97%   BMI 51.78 kg/m          Intake/Output Summary (Last 24 hours) at 12/10/2023 0852  Last data filed at 12/10/2023 0800  Gross per 24 hour   Intake 485.77 ml   Output 6475 ml   Net -5989.23 ml       General: morbidly obese female. No acute distress. Sleeping comfortably  HENT: external ears normal. Nares patent. Mucous membranes moist.  Eyes: perrla, extraocular muscles intact. No scleral icterus.   Neck: unable to assess JVD on BiPAP  Lungs: diminished air entry.  COR: fast rate, regular rhythm, No murmurs, rubs, or gallops  Abd: nondistended, BS present  Extrem: 2-3+ BLE edema  -       Imaging      Chest x-ray 12/9  IMPRESSION: Right-sided PICC line with tip in the distal SVC. Stable cardiomegaly with unchanged mediastinal contours. Pulmonary vascular congestion with mild interstitial edema persists. Increased opacity in the left lower lobe due to atelectasis versus edema with likely a small left pleural effusion. No pneumothorax.     Lab Results   Lab Results   Component Value Date    CHOL 149 12/08/2023    HDL 34 (L) 12/08/2023    TRIG 108 12/08/2023    BUN 15.5 12/11/2023     12/11/2023    CO2 46 (HH) 12/11/2023       Lab Results   Component Value Date    WBC 8.0 12/08/2023    HGB 18.4 (H) 12/08/2023    HCT 66.3 (H) 12/08/2023    MCV 86 12/08/2023     12/08/2023       Lab Results   Component Value Date    TSH 1.47 12/08/2023               Current Inpatient Scheduled Medications   Scheduled Meds:   albuterol  2.5 mg Nebulization 4x Daily    bumetanide  1 mg Intravenous BID    buPROPion  150 mg Oral QAM    enoxaparin ANTICOAGULANT  40 mg Subcutaneous Q12H    insulin aspart  1-7 Units Subcutaneous TID AC    losartan  50 mg Oral Daily    magnesium oxide  400 mg Oral Q4H    nicotine  1 " patch Transdermal Daily    nicotine   Transdermal Q8H    potassium chloride  20 mEq Oral Once    sodium chloride (PF)  3 mL Intracatheter Q8H     Continuous Infusions:         Medications Prior to Admission   Prior to Admission medications    Medication Sig Start Date End Date Taking? Authorizing Provider   aspirin 81 MG EC tablet Take 1 tablet (81 mg) by mouth daily 11/1/23  Yes Michell Lloyd NP   buPROPion (WELLBUTRIN XL) 150 MG 24 hr tablet Take 1 tablet (150 mg) by mouth every morning 11/1/23  Yes Michell Lloyd NP   losartan (COZAAR) 50 MG tablet Take 1 tablet (50 mg) by mouth daily 11/21/23  Yes Abdelrahman Miranda MD   metFORMIN (GLUCOPHAGE XR) 500 MG 24 hr tablet Take 1 tablet (500 mg) by mouth 2 times daily (with meals) for 90 days 11/1/23 1/30/24 Yes Michell Lloyd NP   torsemide (DEMADEX) 20 MG tablet Take 2 tablets (40 mg) once daily on 12/9, 12/10, 12/11, and 12/12.  Get your labs drawn as ordered on either 12/11 or 12/12.  You will then be contacted with recommendations for what dose to take after this.  If you do not hear from our clinic by 12/13, you should plan on reducing your dose down to 1 tablet (20 mg) daily on 12/13. 12/8/23  Yes Abhi Valiente MD

## 2023-12-12 ENCOUNTER — APPOINTMENT (OUTPATIENT)
Dept: CARDIOLOGY | Facility: HOSPITAL | Age: 53
DRG: 286 | End: 2023-12-12
Attending: INTERNAL MEDICINE
Payer: COMMERCIAL

## 2023-12-12 ENCOUNTER — APPOINTMENT (OUTPATIENT)
Dept: PHYSICAL THERAPY | Facility: HOSPITAL | Age: 53
DRG: 286 | End: 2023-12-12
Attending: INTERNAL MEDICINE
Payer: COMMERCIAL

## 2023-12-12 LAB
ALLEN'S TEST: YES
ANION GAP SERPL CALCULATED.3IONS-SCNC: 6 MMOL/L (ref 7–15)
BASE EXCESS BLDA CALC-SCNC: 23.9 MMOL/L
BUN SERPL-MCNC: 28.2 MG/DL (ref 6–20)
CALCIUM SERPL-MCNC: 11.4 MG/DL (ref 8.6–10)
CHLORIDE SERPL-SCNC: 87 MMOL/L (ref 98–107)
COHGB MFR BLD: 94.5 % (ref 96–97)
CREAT SERPL-MCNC: 0.81 MG/DL (ref 0.51–0.95)
DEPRECATED HCO3 PLAS-SCNC: 43 MMOL/L (ref 22–29)
EGFRCR SERPLBLD CKD-EPI 2021: 87 ML/MIN/1.73M2
ERYTHROCYTE [DISTWIDTH] IN BLOOD BY AUTOMATED COUNT: 21.2 % (ref 10–15)
FLOW: 50 LPM
GLUCOSE BLDC GLUCOMTR-MCNC: 146 MG/DL (ref 70–99)
GLUCOSE BLDC GLUCOMTR-MCNC: 152 MG/DL (ref 70–99)
GLUCOSE BLDC GLUCOMTR-MCNC: 159 MG/DL (ref 70–99)
GLUCOSE BLDC GLUCOMTR-MCNC: 178 MG/DL (ref 70–99)
GLUCOSE SERPL-MCNC: 112 MG/DL (ref 70–99)
HCO3 BLD-SCNC: 49 MMOL/L (ref 23–29)
HCT VFR BLD AUTO: 64.4 % (ref 35–47)
HGB BLD-MCNC: 18.1 G/DL (ref 11.7–15.7)
LVEF ECHO: NORMAL
MAGNESIUM SERPL-MCNC: 1.6 MG/DL (ref 1.7–2.3)
MCH RBC QN AUTO: 24 PG (ref 26.5–33)
MCHC RBC AUTO-ENTMCNC: 28.1 G/DL (ref 31.5–36.5)
MCV RBC AUTO: 85 FL (ref 78–100)
O2/TOTAL GAS SETTING VFR VENT: 0.6 %
OXYHGB MFR BLD: 93.2 % (ref 96–97)
PCO2 BLD: 81 MM HG (ref 35–45)
PH BLD: 7.39 [PH] (ref 7.37–7.44)
PLATELET # BLD AUTO: 128 10E3/UL (ref 150–450)
PO2 BLD: 74 MM HG (ref 80–90)
POTASSIUM SERPL-SCNC: 4.2 MMOL/L (ref 3.4–5.3)
RBC # BLD AUTO: 7.55 10E6/UL (ref 3.8–5.2)
SODIUM SERPL-SCNC: 136 MMOL/L (ref 135–145)
TEMPERATURE: 37 DEGREES C
VENTILATION MODE: ABNORMAL
WBC # BLD AUTO: 10.7 10E3/UL (ref 4–11)

## 2023-12-12 PROCEDURE — 82805 BLOOD GASES W/O2 SATURATION: CPT | Performed by: INTERNAL MEDICINE

## 2023-12-12 PROCEDURE — 99233 SBSQ HOSP IP/OBS HIGH 50: CPT | Performed by: HOSPITALIST

## 2023-12-12 PROCEDURE — 80048 BASIC METABOLIC PNL TOTAL CA: CPT | Performed by: INTERNAL MEDICINE

## 2023-12-12 PROCEDURE — 99207 PR CDG-CUT & PASTE-POTENTIAL IMPACT ON LEVEL: CPT | Performed by: HOSPITALIST

## 2023-12-12 PROCEDURE — 83735 ASSAY OF MAGNESIUM: CPT | Performed by: HOSPITALIST

## 2023-12-12 PROCEDURE — 250N000013 HC RX MED GY IP 250 OP 250 PS 637: Performed by: INTERNAL MEDICINE

## 2023-12-12 PROCEDURE — 93306 TTE W/DOPPLER COMPLETE: CPT | Mod: 26 | Performed by: INTERNAL MEDICINE

## 2023-12-12 PROCEDURE — 250N000009 HC RX 250: Performed by: INTERNAL MEDICINE

## 2023-12-12 PROCEDURE — 999N000157 HC STATISTIC RCP TIME EA 10 MIN

## 2023-12-12 PROCEDURE — 94640 AIRWAY INHALATION TREATMENT: CPT | Mod: 76

## 2023-12-12 PROCEDURE — 250N000011 HC RX IP 250 OP 636: Mod: JZ | Performed by: INTERNAL MEDICINE

## 2023-12-12 PROCEDURE — 99233 SBSQ HOSP IP/OBS HIGH 50: CPT | Performed by: INTERNAL MEDICINE

## 2023-12-12 PROCEDURE — 94640 AIRWAY INHALATION TREATMENT: CPT

## 2023-12-12 PROCEDURE — 94660 CPAP INITIATION&MGMT: CPT

## 2023-12-12 PROCEDURE — 85027 COMPLETE CBC AUTOMATED: CPT | Performed by: STUDENT IN AN ORGANIZED HEALTH CARE EDUCATION/TRAINING PROGRAM

## 2023-12-12 PROCEDURE — 99232 SBSQ HOSP IP/OBS MODERATE 35: CPT | Mod: 25 | Performed by: INTERNAL MEDICINE

## 2023-12-12 PROCEDURE — 120N000013 HC R&B IMCU

## 2023-12-12 PROCEDURE — 255N000002 HC RX 255 OP 636: Performed by: HOSPITALIST

## 2023-12-12 PROCEDURE — 999N000208 ECHOCARDIOGRAM COMPLETE

## 2023-12-12 PROCEDURE — 97530 THERAPEUTIC ACTIVITIES: CPT | Mod: GP

## 2023-12-12 PROCEDURE — 250N000013 HC RX MED GY IP 250 OP 250 PS 637: Performed by: HOSPITALIST

## 2023-12-12 RX ORDER — ASPIRIN 81 MG/1
162 TABLET ORAL DAILY
Status: DISCONTINUED | OUTPATIENT
Start: 2023-12-12 | End: 2023-12-14

## 2023-12-12 RX ORDER — SPIRONOLACTONE 25 MG
12.5 TABLET ORAL
Status: DISCONTINUED | OUTPATIENT
Start: 2023-12-12 | End: 2023-12-19 | Stop reason: HOSPADM

## 2023-12-12 RX ORDER — CARVEDILOL 3.12 MG/1
3.12 TABLET ORAL 2 TIMES DAILY WITH MEALS
Status: DISCONTINUED | OUTPATIENT
Start: 2023-12-12 | End: 2023-12-13

## 2023-12-12 RX ORDER — METOLAZONE 5 MG/1
5 TABLET ORAL
Status: DISCONTINUED | OUTPATIENT
Start: 2023-12-12 | End: 2023-12-12

## 2023-12-12 RX ORDER — MAGNESIUM OXIDE 400 MG/1
400 TABLET ORAL EVERY 4 HOURS
Status: COMPLETED | OUTPATIENT
Start: 2023-12-12 | End: 2023-12-12

## 2023-12-12 RX ORDER — ROSUVASTATIN CALCIUM 40 MG/1
40 TABLET, COATED ORAL DAILY
Status: DISCONTINUED | OUTPATIENT
Start: 2023-12-12 | End: 2023-12-19 | Stop reason: HOSPADM

## 2023-12-12 RX ORDER — TORSEMIDE 20 MG/1
20 TABLET ORAL
Status: DISCONTINUED | OUTPATIENT
Start: 2023-12-12 | End: 2023-12-14

## 2023-12-12 RX ORDER — METOLAZONE 5 MG/1
5 TABLET ORAL
Status: DISCONTINUED | OUTPATIENT
Start: 2023-12-12 | End: 2023-12-13

## 2023-12-12 RX ADMIN — CARVEDILOL 3.12 MG: 3.12 TABLET, FILM COATED ORAL at 12:53

## 2023-12-12 RX ADMIN — METOLAZONE 5 MG: 5 TABLET ORAL at 10:01

## 2023-12-12 RX ADMIN — BUPROPION HYDROCHLORIDE 150 MG: 150 TABLET, FILM COATED, EXTENDED RELEASE ORAL at 10:01

## 2023-12-12 RX ADMIN — Medication 162 MG: at 12:53

## 2023-12-12 RX ADMIN — TORSEMIDE 20 MG: 20 TABLET ORAL at 18:06

## 2023-12-12 RX ADMIN — PERFLUTREN 2 ML: 6.52 INJECTION, SUSPENSION INTRAVENOUS at 14:03

## 2023-12-12 RX ADMIN — TORSEMIDE 20 MG: 20 TABLET ORAL at 21:16

## 2023-12-12 RX ADMIN — ALBUTEROL SULFATE 2.5 MG: 2.5 SOLUTION RESPIRATORY (INHALATION) at 19:59

## 2023-12-12 RX ADMIN — NICOTINE 1 PATCH: 21 PATCH, EXTENDED RELEASE TRANSDERMAL at 15:38

## 2023-12-12 RX ADMIN — SPIRONOLACTONE 12.5 MG: 25 TABLET ORAL at 13:30

## 2023-12-12 RX ADMIN — ENOXAPARIN SODIUM 40 MG: 40 INJECTION SUBCUTANEOUS at 10:01

## 2023-12-12 RX ADMIN — LOSARTAN POTASSIUM 50 MG: 50 TABLET, FILM COATED ORAL at 10:01

## 2023-12-12 RX ADMIN — ALBUTEROL SULFATE 2.5 MG: 2.5 SOLUTION RESPIRATORY (INHALATION) at 07:40

## 2023-12-12 RX ADMIN — METOLAZONE 5 MG: 5 TABLET ORAL at 20:18

## 2023-12-12 RX ADMIN — SPIRONOLACTONE 12.5 MG: 25 TABLET ORAL at 21:16

## 2023-12-12 RX ADMIN — ALBUTEROL SULFATE 2.5 MG: 2.5 SOLUTION RESPIRATORY (INHALATION) at 15:59

## 2023-12-12 RX ADMIN — ENOXAPARIN SODIUM 40 MG: 40 INJECTION SUBCUTANEOUS at 21:16

## 2023-12-12 RX ADMIN — MAGNESIUM OXIDE TAB 400 MG (241.3 MG ELEMENTAL MG) 400 MG: 400 (241.3 MG) TAB at 10:01

## 2023-12-12 RX ADMIN — CARVEDILOL 3.12 MG: 3.12 TABLET, FILM COATED ORAL at 23:59

## 2023-12-12 RX ADMIN — MAGNESIUM OXIDE TAB 400 MG (241.3 MG ELEMENTAL MG) 400 MG: 400 (241.3 MG) TAB at 06:43

## 2023-12-12 RX ADMIN — ALBUTEROL SULFATE 2.5 MG: 2.5 SOLUTION RESPIRATORY (INHALATION) at 12:43

## 2023-12-12 RX ADMIN — ROSUVASTATIN CALCIUM 40 MG: 40 TABLET, COATED ORAL at 12:53

## 2023-12-12 RX ADMIN — ACETAMINOPHEN 650 MG: 325 TABLET ORAL at 04:47

## 2023-12-12 ASSESSMENT — ACTIVITIES OF DAILY LIVING (ADL)
ADLS_ACUITY_SCORE: 26

## 2023-12-12 NOTE — PROGRESS NOTES
Patient was off BIPAP AVAPS mode overnight for sleep study.   Patient remained on HFNC overnight 50L 60%. Patient placed on AVAPS at 625am. ABG drawn this morning, results below.  RT will continue to follow.     Recent Labs   Lab 12/12/23  0618 12/11/23  2036 12/09/23  1453 12/09/23  0523 12/08/23  2340   PH 7.39 7.42 7.28* 7.21* 7.25*   PCO2 81* 73*  --  >98* 89*   PO2 74* 69*  --  90 65*   HCO3 49* 48*  --  41* 39*   O2PER 0.6 0.6  --  65 40       Gretta Rockwell, RT

## 2023-12-12 NOTE — PROGRESS NOTES
[facility]    Medicine Progress Note - Hospitalist Service    Date of Admission:  12/8/2023    Assessment & Plan                      Sammy Laws is a 53 year old female with morbid obesity, cad, dm came in for elective cardiac angiogram. During angiogram, was found to have severely elevated pulmonary wedge pressures, decompensated CHF.  Admitted to the ICU on BiPAP, ultimately required Bumex drip for diuresis.  downgraded from ICU 12/10. Ongoing cardiology workup and medication titration. Ideally would not discharge until home AVAPS arranged, Pulm working on this. Hospital Day: 5         1.Acute on chronic hypercapnic and hypoxic resp failure  -ABG on admit showed severe hypercarbia and acidosis- started on BiPAP in ICU but acidosis remained significant, ongoing adjustment of BiPAP settings by intensivist, requiring very high settings with AVAPS.  After diuresis, now able to tolerate high flow nasal cannula during the day, she will need AVAPS at nighttime. Overnight oximetry study completed last night.  -high risk for not only JAMES (never had sleep study), but also hypoventilation syndrome, as well as has severe pulm htn noted on angio.  This is presumably chronic, patient had not been seeing a doctor in some time  -Pulm working on arrangements for home AVAPS  -Scheduled albuterol nebs  -Avoid hyperoxia     2.Acute diastolic HF  -UOP was poor with bolus dose diuretics, changed to Bumex drip and given Diuril IV with immediate improvement.  UOP worse again on bolus dose Bumex, Bumex drip resumed last night. >40 lbs down from admission. Now cardiology changing her to PO diuretic plus metolazone and spironolactone. Will want to monitor closely to ensure adequate diuresis on this regimen.  -cardiology following  -Murphy out, voiding well  -Trend BMP  -cardiology considering RHC     3.Somnolence  -due to hypercarbia  -monitor as indicator of adequacy of NIPPV     4.CAD  -s/p cath, no stents  placed due to poor hemodynamics at time of cath and need for urgent diuresis. Could benefit from stenting of mid RCA and L PDA, cardiology planning to discuss with CV surgery before considering another PCI attempt. Has multiple other areas of disease not amenable to stenting.  -ASA, statin, BB     5.DM  -sliding scale  -recent A1c 7.2  -hold po metformin  -cardiology considering Jardiance or Ozempic, no objection to either from my standpoint    6. Morbid obesity  -BMI 51  -recommend consider outpatient Bariatrics eval, this is a life limiting diagnosis at this point    7.HTN  -home losartan    8. Depression  -home Wellbutrin    9. Nicotine dependence  -Patch  -Requested help quitting    10.  Nonsustained V. Tach  -Attributed to PICC line, resolved after PICC line was pulled back 2 cm  -Continue to monitor on telemetry for now    K and Mg protocols       DVT Prophylaxis: High risk. Lovenox  Diet: Diet  Combination Diet Regular Diet; Moderate Consistent Carb (60 g CHO per Meal) Diet; 2 gm NA Diet    Murphy Catheter: Not present  Lines: PRESENT      PICC 12/09/23 Triple Lumen Right Brachial vein medial Vascular access-Site Assessment: WDL      Cardiac Monitoring: ACTIVE order. Indication: Tachyarrhythmias, acute (48 hours)  Code Status: Full Code      Clinically Significant Risk Factors           # Hypercalcemia: Highest Ca = 11.4 mg/dL in last 2 days, will monitor as appropriate  # Hypomagnesemia: Lowest Mg = 1.6 mg/dL in last 2 days, will replace as needed     # Thrombocytopenia: Lowest platelets = 128 in last 2 days, will monitor for bleeding   # Hypertension: Noted on problem list    # Acute heart failure with preserved ejection fraction: heart failure noted on problem list, last echo with EF >50%, and receiving IV diuretics      # DMII: A1C = 7.2 % (Ref range: 0.0 - 5.6 %) within past 6 months   # Severe Obesity: Estimated body mass index is 51.48 kg/m  as calculated from the following:    Height as of this  "encounter: 1.6 m (5' 3\").    Weight as of this encounter: 131.8 kg (290 lb 9.6 oz).             Disposition Plan   Disposition: Home     Expected Discharge Date: 12/15/2023        Discharge Comments: will need AVAPS machine for home     Medically ready to discharge today: No         Vianey Jackson MD  Hospitalist Service  [facility]  Securely message with [employer] (more info)  Text page via [employer] Paging/Directory   ______________________________________________________________________      Physical Exam   Vital Signs: Temp: 98.5  F (36.9  C) Temp src: Oral BP: 113/71 Pulse: 92   Resp: 18 SpO2: 91 % O2 Device: High Flow Nasal Cannula (HFNC) Oxygen Delivery: 50 LPM  Weight: 290 lbs 9.6 oz  General: in no apparent distress, non-toxic, and alert female sitting in bedside chair oriented x3  HEENT: Head normocephalic atraumatic, oral mucosa moist. Sclerae anicteric  Skin:  psoriasis plaques on elbows  Extremities: Trace ankle edema bilaterally  Psych: Flat affect, mood euthymic  Neuro: Grossly normal          Medical Decision Making           Data   Recent Results (from the past 12 hour(s))   Basic metabolic panel    Collection Time: 12/12/23  5:34 AM   Result Value Ref Range    Sodium 136 135 - 145 mmol/L    Potassium 4.2 3.4 - 5.3 mmol/L    Chloride 87 (L) 98 - 107 mmol/L    Carbon Dioxide (CO2) 43 (H) 22 - 29 mmol/L    Anion Gap 6 (L) 7 - 15 mmol/L    Urea Nitrogen 28.2 (H) 6.0 - 20.0 mg/dL    Creatinine 0.81 0.51 - 0.95 mg/dL    GFR Estimate 87 >60 mL/min/1.73m2    Calcium 11.4 (H) 8.6 - 10.0 mg/dL    Glucose 112 (H) 70 - 99 mg/dL   Magnesium    Collection Time: 12/12/23  5:34 AM   Result Value Ref Range    Magnesium 1.6 (L) 1.7 - 2.3 mg/dL   CBC with platelets    Collection Time: 12/12/23  5:34 AM   Result Value Ref Range    WBC Count 10.7 4.0 - 11.0 10e3/uL    RBC Count 7.55 (H) 3.80 - 5.20 10e6/uL    Hemoglobin 18.1 (H) 11.7 - 15.7 g/dL    Hematocrit 64.4 (H) 35.0 - 47.0 %    MCV 85 78 - 100 " fL    MCH 24.0 (L) 26.5 - 33.0 pg    MCHC 28.1 (L) 31.5 - 36.5 g/dL    RDW 21.2 (H) 10.0 - 15.0 %    Platelet Count 128 (L) 150 - 450 10e3/uL   Blood gas arterial    Collection Time: 12/12/23  6:18 AM   Result Value Ref Range    pH Arterial 7.39 7.37 - 7.44    pCO2 Arterial 81 (HH) 35 - 45 mm Hg    pO2 Arterial 74 (L) 80 - 90 mm Hg    Bicarbonate Arterial 49 (H) 23 - 29 mmol/L    O2 Sat, Arterial 94.5 (L) 96.0 - 97.0 %    Oxyhemoglobin 93.2 (L) 96.0 - 97.0 %    Base Excess/Deficit 23.9   mmol/L    Ventilation Mode HFNC     FIO2 0.6     Flow 50.0 LPM    Sample Stabilized Temperature 37.0 degrees C    Jose's Test Yes    Glucose by meter    Collection Time: 12/12/23  7:47 AM   Result Value Ref Range    GLUCOSE BY METER POCT 152 (H) 70 - 99 mg/dL   Glucose by meter    Collection Time: 12/12/23 12:45 PM   Result Value Ref Range    GLUCOSE BY METER POCT 159 (H) 70 - 99 mg/dL   Echocardiogram Complete    Collection Time: 12/12/23  2:05 PM   Result Value Ref Range    LVEF  50-55% (borderline)    Glucose by meter    Collection Time: 12/12/23  3:50 PM   Result Value Ref Range    GLUCOSE BY METER POCT 146 (H) 70 - 99 mg/dL     Interval History   Patient states doing okay today.  She is wearing the BiPAP mask for few hours this morning after being on high flow overnight for oximetry study.  Now awake and alert, she is about to get up and sit in the chair.  Not much appetite, dislikes hospital food and dietary restrictions.  Scaling back on high flow as tolerated.  Not ready for discharge.

## 2023-12-12 NOTE — PLAN OF CARE
Pt alertx4, but drowsy, awakens to voice. Lung sounds diminished, on High flow nasal cannula at 50 L with 60 % FiO2. Sleep study being done tonight, 12/11. Pt was up in chair until 2100. VSS.    Protocols:  K+: 3.7, replaced, am recheck, 12/12.  Magnesium: 1.8, replaced, am recheck, 12/12.    Telemetry reads Normal Sinus Rhythm.    Problem: Pulmonary Impairment  Goal: Improved Activity Tolerance  Outcome: Progressing  Goal: Effective Airway Clearance  Outcome: Progressing  Goal: Optimal Gas Exchange  Outcome: Progressing

## 2023-12-12 NOTE — PROGRESS NOTES
Pulmonary Progress Note  12/12/2023      Admit Date: 12/8/2023  CODE: Full Code    Reason for Consult: acute on chronic respiratory failure with hypoxemia and hypercapnia. Cor pulmonale with volume overload.     Assessment/Plan:   53F w/ super morbid obesity, smoker, lack of medical care, DM2, HTN, presented 12/8 for elective RHC and LHC. Found to have severe pulm HTN  (mPAP 52 mm Hg, CO 6.09 L/min, PVR 1.12 JOYNER, DPG is negative and 7 mm Hg), severely elevated left sided and right sided filling pressures (PCWP 45 mm Hg), consistent with isolated post-capillary (Ipc-PH; e.g. WHO group II/III). Improving slowly with NIPPV, HFNC, aggressive diuresis with ~40lbs weight reduction since admission. Renal function stable. Baseline PaCO2 appears to be around 80 mm Hg. Discussed importance of quitting smoking, strict adherence to low salt diet, and compliance with NIPPV which we will get her qualified for once she's closer to discharge.     Recommendations:  - titrate HFNC for goal SpO2 88-92%, critical to avoid hyperoxia as this could worsen CO2 retention.  - continue BiPAP/AVAPs mode with naps and every night settings: RR 18bpm, Vt 450cc, EPAP 10, min IPAP 12, max IPAP 35, FiO2 per SpO2 goals as above  - home NIV qualification with [facility] once closer to discharge. I tried reaching out to [facility] but I was unable to get through.  - cardiology following; appreciate input  - continue bumetanide infusion @2mg/hr. Defer to cardiology for further dose adjustments.  - add metolazone 5mg po BID for additional diuresis  - strict intake/output and daily weights please  - follow renal function, serum CO2 closely. May require acetazolamide if bicarb continues to increase with diuresis.  - smoking cessation encouraged  - will need follow up in sleep clinic and pulm clinic with PFTs; we'll help arrange once closer to discharge. She would prefer something near [facility]. I advised she follow up with  "[facility] as [facility] pulmonology does not have a presence there, to my knowledge.    We'll continue to follow.    Shreyas Mendoza MD (Avi)  [facility]  Pager [phone]  Clinic [phone]  Fax [phone]     Subjective/Interim Events:   Feels a bit better.   On HFNC 0.6 50Lpm.  SpO2 hanging right at 90%.    Good diuresis with bumetanide infusion.  Weight down to 292 lbs (yesterday) from 334 lbs on admission.      Medications:      bumetanide 1 mg/hr (12/12/23 0800)      albuterol  2.5 mg Nebulization 4x Daily    buPROPion  150 mg Oral QAM    enoxaparin ANTICOAGULANT  40 mg Subcutaneous Q12H    insulin aspart  1-7 Units Subcutaneous TID AC    losartan  50 mg Oral Daily    metolazone  5 mg Oral BID    nicotine  1 patch Transdermal Daily    nicotine   Transdermal Q8H    sodium chloride (PF)  3 mL Intracatheter Q8H         Exam/Data:   Vitals  /62 (BP Location: Left arm)   Pulse 95   Temp 98.4  F (36.9  C) (Axillary)   Resp 20   Ht 1.6 m (5' 3\")   Wt 132.6 kg (292 lb 5.3 oz)   LMP  (LMP Unknown)   SpO2 92%   BMI 51.78 kg/m    BP - Mean:  [70-79] 79  I/O last 3 completed shifts:  In: 709 [P.O.:380; I.V.:329]  Out: 2050 [Urine:2050]  Weight change:   [unfilled]  FiO2 (%): 60 %  Resp: 20      EXAM:  Physical Exam  Gen: awake, alert, oriented, no distress  HEENT: NT, no GENA  CV: RRR, no m/g/r  Resp: markedly diminished due to body habitus. No obvious wheezing.   Abd: soft, nontender, BS+  Skin: no rashes or lesions  Ext: no edema  Neuro: PERRL, nonfocal exam    ROS:  A 10-system review was obtained and is negative with the exception of the symptoms noted above.    DATA:    PFT DATA none available    No micro          IMAGING:   XR Chest 1 View    Result Date: 12/9/2023  EXAM: XR CHEST 1 VIEW LOCATION: [facility] DATE: 12/9/2023 INDICATION: PICC, V tach COMPARISON: 12/08/2023     IMPRESSION: Right-sided PICC line " with tip in the distal SVC. Stable cardiomegaly with unchanged mediastinal contours. Pulmonary vascular congestion with mild interstitial edema persists. Increased opacity in the left lower lobe due to atelectasis versus  edema with likely a small left pleural effusion. No pneumothorax.    Echocardiogram Limited    Result Date: 2023  [id number]^WILL^DANA^[place] [address]  Name: SHELTON WEBER MRN: [id number] : 1970 Study Date: 2023 10:46 AM Age: 53 yrs Gender: Female Patient [facility] Reason For Study: CHF Ordering Physician: DANA SOLIS Referring Physician: KATHRINE REECE Performed By: PORTER  BSA: 2.3 m2 Height: 63 in Weight: 306 lb HR: 99 BP: 113/62 mmHg ______________________________________________________________________________ Procedure Limited Portable Echo Adult. Definity (NDC #94080-182) given intravenously. Lot [id number], Exp 1 Dec 24. Technically difficult study. Poor acoustic windows. ______________________________________________________________________________ Interpretation Summary  Left ventricular size, wall motion and function are normal. The ejection fraction is 60-65%. Normal right ventricle size and systolic function. On limited evaluation, no significant valve disease is identified. Compared to the prior study the left ventricle now appears normal in function. ______________________________________________________________________________ Left Ventricle Left ventricular size, wall motion and function are normal. The ejection fraction is 60-65%.  Right Ventricle Normal right ventricle size and systolic function.  Mitral Valve Mitral valve leaflets appear normal. There is no evidence of mitral stenosis or clinically significant mitral regurgitation.  Tricuspid Valve The tricuspid valve is not well visualized, but is grossly normal. Right ventricular systolic pressure could not be approximated  "due to inadequate tricuspid regurgitation.  Aortic Valve Aortic valve leaflets appear normal. There is no evidence of aortic stenosis or clinically significant aortic regurgitation.  Vessels The inferior vena cava cannot be assessed.  Pericardium There is no pericardial effusion.  Rhythm Sinus rhythm was noted.  ______________________________________________________________________________ MMode/2D Measurements & Calculations IVSd: 1.2 cm LVIDd: 5.1 cm LVIDs: 3.2 cm LVPWd: 1.0 cm FS: 38.0 % LV mass(C)d: 217.9 grams LV mass(C)dI: 94.0 grams/m2 LA dimension: 4.0 cm RWT: 0.40  Time Measurements MM HR: 99.0 BPM  ______________________________________________________________________________ Report approved by: Meredith Whitman 12/09/2023 02:19 PM       XR Chest Port 1 View    Result Date: 12/8/2023  EXAM: XR CHEST PORT 1 VIEW LOCATION: [facility] DATE: 12/8/2023 INDICATION: Shortness of breath. COMPARISON: 11/01/2023     IMPRESSION: Diffuse interstitial prominence with hazy perihilar and lower lung opacities, likely mild interstitial and alveolar pulmonary edema. No pleural effusion or pneumothorax. Stable cardiomegaly.    Cardiac Catheterization    Result Date: 12/8/2023  Multivessel CAD as described below: LM: Normal LAD: Diffuse mild disease with a focal moderate lesion in the distal LAD.  DFR is positive (0.84) beyond the distal LAD lesion, but DFR pullback shows gradual change in DFR, with no focal \"step-up\" to suggest a benefit of PCI. LCx: Codominant vessel with severe stenosis downstream in the L PDA.  This is a relatively small vessel at this point (roughly 2 mm in diameter). RCA: Codominant vessel with chronic subtotal occlusion in the mid vessel, with ALVINA I flow distally. Severe WHO II (postcapillary) pulmonary hypertension. Severely elevated pulmonary capillary wedge pressure (~45 mmHg). Normal cardiac index by Tiana. Would recommend focus currently on diuresis/afterload reduction " to improve pulmonary hypertension and LV filling pressures.  We will give 40 mg of IV Lasix today, then change her home diuretic from furosemide 20 mg oral daily to torsemide 40 mg oral daily, with plan for repeat labs in roughly 3 days and dose adjustment as needed based on this. Following improvement of her hemodynamics/volume status, could consider return to the Cath Lab for PCI of the LPDA and mid RCA versus medical management.

## 2023-12-12 NOTE — PLAN OF CARE
Goal Outcome Evaluation:    A&Ox4. Up SBA. Tylenol given for mild fever overnight. ST in the 100s. Pt complaints of tender abdomen, and generalized aching.   High flow NC overnight 50L 60%, now BIPAP. VSS.   Purewick replaced, good output.     PICC with Bumex @ 4 ml/hr.     Problem: Adult Inpatient Plan of Care  Goal: Plan of Care Review    Outcome: Progressing  Goal: Absence of Hospital-Acquired Illness or Injury  Outcome: Progressing  Intervention: Identify and Manage Fall Risk  Recent Flowsheet Documentation  Taken 12/12/2023 0400 by Destinee Felipe RN  Safety Promotion/Fall Prevention:   assistive device/personal items within reach   clutter free environment maintained   nonskid shoes/slippers when out of bed   patient and family education   room organization consistent   safety round/check completed  Taken 12/12/2023 0000 by Destinee Felipe RN  Safety Promotion/Fall Prevention:   assistive device/personal items within reach   clutter free environment maintained   nonskid shoes/slippers when out of bed   patient and family education   room organization consistent   safety round/check completed  Intervention: Prevent Skin Injury  Recent Flowsheet Documentation  Taken 12/12/2023 0443 by Destinee Felipe RN  Body Position:   position changed independently   supine, legs elevated   supine, head elevated  Taken 12/12/2023 0400 by Destinee Felipe RN  Body Position:   position changed independently   supine, legs elevated   supine, head elevated  Skin Protection:   adhesive use limited   incontinence pads utilized   pulse oximeter probe site changed  Device Skin Pressure Protection: (pt refused skin to device padding - heated high flow) --  Taken 12/12/2023 0000 by Destinee Felipe RN  Body Position:   position changed independently   supine, legs elevated   supine, head elevated  Skin Protection:   adhesive use limited   incontinence pads utilized   pulse oximeter probe site changed  Device Skin Pressure  Protection: (pt refused skin to device padding - heated high flow) --  Intervention: Prevent and Manage VTE (Venous Thromboembolism) Risk  Recent Flowsheet Documentation  Taken 12/12/2023 0400 by Destinee Felipe RN  VTE Prevention/Management: SCDs (sequential compression devices) off  Taken 12/12/2023 0000 by Destinee Felipe RN  VTE Prevention/Management: SCDs (sequential compression devices) off  Intervention: Prevent Infection  Recent Flowsheet Documentation  Taken 12/12/2023 0400 by Destinee Felipe RN  Infection Prevention:   rest/sleep promoted   personal protective equipment utilized  Taken 12/12/2023 0000 by Destinee Felipe RN  Infection Prevention:   rest/sleep promoted   personal protective equipment utilized  Goal: Optimal Comfort and Wellbeing  Outcome: Progressing  Intervention: Provide Person-Centered Care  Recent Flowsheet Documentation  Taken 12/12/2023 0400 by Destinee Felipe RN  Trust Relationship/Rapport:   care explained   choices provided   emotional support provided   questions encouraged  Taken 12/12/2023 0000 by Destinee Felipe RN  Trust Relationship/Rapport:   care explained   choices provided   emotional support provided   questions encouraged  Goal: Readiness for Transition of Care  Outcome: Progressing     Problem: Pulmonary Impairment  Goal: Improved Activity Tolerance  Outcome: Progressing  Goal: Effective Airway Clearance  Outcome: Progressing  Goal: Optimal Gas Exchange  Outcome: Progressing     12/12/23

## 2023-12-12 NOTE — PROGRESS NOTES
Imp/plan:  HFmrEF - EF 45% in setting of severe three vessel dz (see below),with PCWP 51mmHg, PA mean 52mmHg, bumex continuous infusion, metalazone 5mg bid, losartan 50mg dailyedema and dyspnea slightly better complicated by hx tob use/JAMES, DM; Cr today 0.81, K 4.2.  Loss over 40lbs since last week. Will change to po medications oral toresemide and spironolactone, metalazone, and add carvedilol (no hx asthma).  Repeat echo to estimate PA pressure and EF, might need RHC given hypoxia related to lung dz as well.   CAD with diffuse dz in LAD with abn dFR distal (no clear severe narrowing), subtotal RCA mid and severe dz in left dom PDA.  In setting DM but also lung dz/obesity/CM, will discuss with CV surgery before considering PCI as an option. Start asp and statin, along with carvedilol    Current Facility-Administered Medications   Medication    acetaminophen (TYLENOL) tablet 650 mg    albuterol (PROVENTIL) neb solution 2.5 mg    bisacodyl (DULCOLAX) suppository 10 mg    bumetanide (BUMEX) 0.25 mg/mL infusion    buPROPion (WELLBUTRIN XL) 24 hr tablet 150 mg    calcium carbonate (TUMS) chewable tablet 500 mg    carboxymethylcellulose PF (REFRESH PLUS) 0.5 % ophthalmic solution 1 drop    glucose gel 15-30 g    Or    dextrose 50 % injection 25-50 mL    Or    glucagon injection 1 mg    docusate sodium (COLACE) capsule 100 mg    enoxaparin ANTICOAGULANT (LOVENOX) injection 40 mg    famotidine (PEPCID) tablet 10 mg    Hold: metformin and metformin containing medications on day of the procedure and for 48 hours after IV contrast given- Patients with acute kidney injury or severe chronic kidney disease (stage IV or stage V; i.e., eGFR less than 30)    hydrALAZINE (APRESOLINE) injection 5 mg    insulin aspart (NovoLOG) injection (RAPID ACTING)    lidocaine (LMX4) cream    lidocaine 1 % 0.1-1 mL    losartan (COZAAR) tablet 50 mg    melatonin tablet 3 mg    metolazone (ZAROXOLYN) tablet 5 mg    naloxone (NARCAN) injection 0.2  "mg    Or    naloxone (NARCAN) injection 0.4 mg    Or    naloxone (NARCAN) injection 0.2 mg    Or    naloxone (NARCAN) injection 0.4 mg    nicotine (NICODERM CQ) 21 MG/24HR 24 hr patch 1 patch    nicotine Patch in Place    ondansetron (ZOFRAN) injection 4 mg    polyethylene glycol (MIRALAX) Packet 17 g    sodium chloride (OCEAN) 0.65 % nasal spray 1 spray    sodium chloride (PF) 0.9% PF flush 10-40 mL    sodium chloride (PF) 0.9% PF flush 3 mL    sodium chloride (PF) 0.9% PF flush 3 mL     Past Medical History:   Diagnosis Date    CHF (congestive heart failure) (H)     DMII (diabetes mellitus, type 2) (H)     Essential hypertension     Morbid obesity (H)     Pulmonary hypertension (H)         Review of Systems: 12 points negative other than above    /62 (BP Location: Left arm)   Pulse 95   Temp 98.4  F (36.9  C) (Axillary)   Resp 20   Ht 1.6 m (5' 3\")   Wt 132.6 kg (292 lb 5.3 oz)   LMP  (LMP Unknown)   SpO2 92%   BMI 51.78 kg/m    JVP<7cm, carotids normal  Lungs clear  Cor RRR no c,r,m  Abs soft +BS, no mass  Ext no c,c,e    Lab Results   Component Value Date    HGB 18.1 (H) 12/12/2023     Lab Results   Component Value Date     (L) 12/12/2023     No results found for: \"CREATININE\"  No components found for: \"K\"          Rashaun Rossi MD  Interventional Cardiology   [facility]  [phone]    "

## 2023-12-13 ENCOUNTER — APPOINTMENT (OUTPATIENT)
Dept: PHYSICAL THERAPY | Facility: HOSPITAL | Age: 53
DRG: 286 | End: 2023-12-13
Attending: INTERNAL MEDICINE
Payer: COMMERCIAL

## 2023-12-13 DIAGNOSIS — Z71.6 ENCOUNTER FOR TOBACCO USE CESSATION COUNSELING: ICD-10-CM

## 2023-12-13 LAB
ANION GAP SERPL CALCULATED.3IONS-SCNC: 4 MMOL/L (ref 7–15)
BUN SERPL-MCNC: 42.9 MG/DL (ref 6–20)
CALCIUM SERPL-MCNC: 11.6 MG/DL (ref 8.6–10)
CHLORIDE SERPL-SCNC: 85 MMOL/L (ref 98–107)
CREAT SERPL-MCNC: 0.86 MG/DL (ref 0.51–0.95)
DEPRECATED HCO3 PLAS-SCNC: 46 MMOL/L (ref 22–29)
EGFRCR SERPLBLD CKD-EPI 2021: 81 ML/MIN/1.73M2
GLUCOSE BLDC GLUCOMTR-MCNC: 101 MG/DL (ref 70–99)
GLUCOSE BLDC GLUCOMTR-MCNC: 132 MG/DL (ref 70–99)
GLUCOSE BLDC GLUCOMTR-MCNC: 144 MG/DL (ref 70–99)
GLUCOSE BLDC GLUCOMTR-MCNC: 147 MG/DL (ref 70–99)
GLUCOSE BLDC GLUCOMTR-MCNC: 167 MG/DL (ref 70–99)
GLUCOSE SERPL-MCNC: 109 MG/DL (ref 70–99)
MAGNESIUM SERPL-MCNC: 1.7 MG/DL (ref 1.7–2.3)
POTASSIUM SERPL-SCNC: 3.8 MMOL/L (ref 3.4–5.3)
SODIUM SERPL-SCNC: 135 MMOL/L (ref 135–145)

## 2023-12-13 PROCEDURE — 94640 AIRWAY INHALATION TREATMENT: CPT

## 2023-12-13 PROCEDURE — 99232 SBSQ HOSP IP/OBS MODERATE 35: CPT | Performed by: INTERNAL MEDICINE

## 2023-12-13 PROCEDURE — 250N000013 HC RX MED GY IP 250 OP 250 PS 637: Performed by: INTERNAL MEDICINE

## 2023-12-13 PROCEDURE — 250N000013 HC RX MED GY IP 250 OP 250 PS 637: Performed by: HOSPITALIST

## 2023-12-13 PROCEDURE — 120N000013 HC R&B IMCU

## 2023-12-13 PROCEDURE — 250N000011 HC RX IP 250 OP 636: Mod: JZ | Performed by: INTERNAL MEDICINE

## 2023-12-13 PROCEDURE — 94660 CPAP INITIATION&MGMT: CPT

## 2023-12-13 PROCEDURE — 99207 PR CDG-CUT & PASTE-POTENTIAL IMPACT ON LEVEL: CPT | Performed by: INTERNAL MEDICINE

## 2023-12-13 PROCEDURE — 250N000009 HC RX 250: Performed by: INTERNAL MEDICINE

## 2023-12-13 PROCEDURE — 80048 BASIC METABOLIC PNL TOTAL CA: CPT | Performed by: INTERNAL MEDICINE

## 2023-12-13 PROCEDURE — 94640 AIRWAY INHALATION TREATMENT: CPT | Mod: 76

## 2023-12-13 PROCEDURE — 99233 SBSQ HOSP IP/OBS HIGH 50: CPT | Performed by: INTERNAL MEDICINE

## 2023-12-13 PROCEDURE — 97116 GAIT TRAINING THERAPY: CPT | Mod: GP

## 2023-12-13 PROCEDURE — 99221 1ST HOSP IP/OBS SF/LOW 40: CPT | Performed by: SURGERY

## 2023-12-13 PROCEDURE — 999N000157 HC STATISTIC RCP TIME EA 10 MIN

## 2023-12-13 PROCEDURE — 83735 ASSAY OF MAGNESIUM: CPT | Performed by: HOSPITALIST

## 2023-12-13 RX ORDER — ACETAZOLAMIDE 500 MG/5ML
250 INJECTION, POWDER, LYOPHILIZED, FOR SOLUTION INTRAVENOUS DAILY
Qty: 5 ML | Refills: 0 | Status: COMPLETED | OUTPATIENT
Start: 2023-12-13 | End: 2023-12-14

## 2023-12-13 RX ORDER — CARVEDILOL 3.12 MG/1
6.25 TABLET ORAL 2 TIMES DAILY WITH MEALS
Status: DISCONTINUED | OUTPATIENT
Start: 2023-12-13 | End: 2023-12-14

## 2023-12-13 RX ORDER — BUPROPION HYDROCHLORIDE 150 MG/1
150 TABLET ORAL EVERY MORNING
Qty: 90 TABLET | Refills: 2 | Status: SHIPPED | OUTPATIENT
Start: 2023-12-13 | End: 2024-03-18

## 2023-12-13 RX ORDER — FAMOTIDINE 20 MG/1
20 TABLET, FILM COATED ORAL AT BEDTIME
Status: DISCONTINUED | OUTPATIENT
Start: 2023-12-13 | End: 2023-12-19 | Stop reason: HOSPADM

## 2023-12-13 RX ORDER — MAGNESIUM OXIDE 400 MG/1
400 TABLET ORAL EVERY 4 HOURS
Status: COMPLETED | OUTPATIENT
Start: 2023-12-13 | End: 2023-12-13

## 2023-12-13 RX ORDER — LOSARTAN POTASSIUM 25 MG/1
25 TABLET ORAL DAILY
Status: DISCONTINUED | OUTPATIENT
Start: 2023-12-14 | End: 2023-12-19 | Stop reason: HOSPADM

## 2023-12-13 RX ORDER — CARVEDILOL 3.12 MG/1
6.25 TABLET ORAL 2 TIMES DAILY WITH MEALS
Status: DISCONTINUED | OUTPATIENT
Start: 2023-12-13 | End: 2023-12-13

## 2023-12-13 RX ADMIN — Medication 162 MG: at 09:39

## 2023-12-13 RX ADMIN — FAMOTIDINE 20 MG: 20 TABLET ORAL at 20:51

## 2023-12-13 RX ADMIN — CARVEDILOL 6.25 MG: 3.12 TABLET, FILM COATED ORAL at 09:41

## 2023-12-13 RX ADMIN — NICOTINE 1 PATCH: 21 PATCH, EXTENDED RELEASE TRANSDERMAL at 18:23

## 2023-12-13 RX ADMIN — LOSARTAN POTASSIUM 50 MG: 50 TABLET, FILM COATED ORAL at 09:40

## 2023-12-13 RX ADMIN — ENOXAPARIN SODIUM 40 MG: 40 INJECTION SUBCUTANEOUS at 09:41

## 2023-12-13 RX ADMIN — ENOXAPARIN SODIUM 40 MG: 40 INJECTION SUBCUTANEOUS at 20:53

## 2023-12-13 RX ADMIN — MAGNESIUM OXIDE TAB 400 MG (241.3 MG ELEMENTAL MG) 400 MG: 400 (241.3 MG) TAB at 12:45

## 2023-12-13 RX ADMIN — ALBUTEROL SULFATE 2.5 MG: 2.5 SOLUTION RESPIRATORY (INHALATION) at 19:36

## 2023-12-13 RX ADMIN — ALBUTEROL SULFATE 2.5 MG: 2.5 SOLUTION RESPIRATORY (INHALATION) at 15:50

## 2023-12-13 RX ADMIN — TORSEMIDE 20 MG: 20 TABLET ORAL at 20:51

## 2023-12-13 RX ADMIN — ROSUVASTATIN CALCIUM 40 MG: 40 TABLET, COATED ORAL at 09:40

## 2023-12-13 RX ADMIN — ALBUTEROL SULFATE 2.5 MG: 2.5 SOLUTION RESPIRATORY (INHALATION) at 11:27

## 2023-12-13 RX ADMIN — SPIRONOLACTONE 12.5 MG: 25 TABLET ORAL at 20:51

## 2023-12-13 RX ADMIN — SPIRONOLACTONE 12.5 MG: 25 TABLET ORAL at 09:40

## 2023-12-13 RX ADMIN — MAGNESIUM OXIDE TAB 400 MG (241.3 MG ELEMENTAL MG) 400 MG: 400 (241.3 MG) TAB at 09:40

## 2023-12-13 RX ADMIN — TORSEMIDE 20 MG: 20 TABLET ORAL at 09:40

## 2023-12-13 RX ADMIN — DOCUSATE SODIUM 100 MG: 100 CAPSULE, LIQUID FILLED ORAL at 09:42

## 2023-12-13 RX ADMIN — ACETAZOLAMIDE 250 MG: 500 INJECTION, POWDER, LYOPHILIZED, FOR SOLUTION INTRAVENOUS at 10:33

## 2023-12-13 RX ADMIN — ALBUTEROL SULFATE 2.5 MG: 2.5 SOLUTION RESPIRATORY (INHALATION) at 07:24

## 2023-12-13 RX ADMIN — BUPROPION HYDROCHLORIDE 150 MG: 150 TABLET, FILM COATED, EXTENDED RELEASE ORAL at 09:39

## 2023-12-13 ASSESSMENT — ACTIVITIES OF DAILY LIVING (ADL)
ADLS_ACUITY_SCORE: 26
ADLS_ACUITY_SCORE: 26
ADLS_ACUITY_SCORE: 28
ADLS_ACUITY_SCORE: 26
ADLS_ACUITY_SCORE: 28
ADLS_ACUITY_SCORE: 26
ADLS_ACUITY_SCORE: 28
ADLS_ACUITY_SCORE: 26

## 2023-12-13 NOTE — PROGRESS NOTES
Patient was seen on high flow nasal cannula at 50lpm and 60%. Pt has been on it all day and tolerating well. Pt was placed on Bipap on AVAPS for sleep on the settings as charted in the flow sheet. Duoneb treatments given as ordered. Pt tolerated well. No respiratory distress noted at this time.RT following.    Shelly Swan, RT

## 2023-12-13 NOTE — PROGRESS NOTES
Care Management Follow Up    Length of Stay (days): 5    Expected Discharge Date: 12/15/2023     Concerns to be Addressed:       Patient plan of care discussed at interdisciplinary rounds: Yes    Anticipated Discharge Disposition:  home      Anticipated Discharge Services:    Anticipated Discharge DME:      Patient/family educated on Medicare website which has current facility and service quality ratings:    Education Provided on the Discharge Plan:    Patient/Family in Agreement with the Plan:      Referrals Placed by CM/SW:    Private pay costs discussed: Not applicable    Additional Information:  Chart reviewed. CM following for needs. Anticipate discharge home when medically ready      Ghada Warren RN

## 2023-12-13 NOTE — PROGRESS NOTES
Imp/plan:  HF - EF improved now to 50-55% from 45%, can not measure PA/RA pressure by echo, BUN how higher with normal Cr, K3.8, cont toresamide 20mg bid, spironolactone 12.5mg bid, losartan 50mg daily, carvedilol, will raise 6.25mg bid, will stop metalazone, noted addition of diamox for elevated CO2   CAD multivessel dz, on asp, statin, CV to consult (thank you).  Higher risk given lung issues and high BMI, and diffuse dz in LAD    Current Facility-Administered Medications   Medication    acetaminophen (TYLENOL) tablet 650 mg    albuterol (PROVENTIL) neb solution 2.5 mg    aspirin EC tablet 162 mg    bisacodyl (DULCOLAX) suppository 10 mg    buPROPion (WELLBUTRIN XL) 24 hr tablet 150 mg    calcium carbonate (TUMS) chewable tablet 500 mg    carboxymethylcellulose PF (REFRESH PLUS) 0.5 % ophthalmic solution 1 drop    carvedilol (COREG) tablet 3.125 mg    glucose gel 15-30 g    Or    dextrose 50 % injection 25-50 mL    Or    glucagon injection 1 mg    docusate sodium (COLACE) capsule 100 mg    enoxaparin ANTICOAGULANT (LOVENOX) injection 40 mg    famotidine (PEPCID) tablet 10 mg    Hold: metformin and metformin containing medications on day of the procedure and for 48 hours after IV contrast given- Patients with acute kidney injury or severe chronic kidney disease (stage IV or stage V; i.e., eGFR less than 30)    hydrALAZINE (APRESOLINE) injection 5 mg    insulin aspart (NovoLOG) injection (RAPID ACTING)    lidocaine (LMX4) cream    lidocaine 1 % 0.1-1 mL    losartan (COZAAR) tablet 50 mg    magnesium oxide (MAG-OX) tablet 400 mg    melatonin tablet 3 mg    metolazone (ZAROXOLYN) tablet 5 mg    naloxone (NARCAN) injection 0.2 mg    Or    naloxone (NARCAN) injection 0.4 mg    Or    naloxone (NARCAN) injection 0.2 mg    Or    naloxone (NARCAN) injection 0.4 mg    nicotine (NICODERM CQ) 21 MG/24HR 24 hr patch 1 patch    nicotine Patch in Place    ondansetron (ZOFRAN) injection 4 mg    polyethylene glycol (MIRALAX) Packet  "17 g    rosuvastatin (CRESTOR) tablet 40 mg    sodium chloride (OCEAN) 0.65 % nasal spray 1 spray    sodium chloride (PF) 0.9% PF flush 10-40 mL    sodium chloride (PF) 0.9% PF flush 3 mL    sodium chloride (PF) 0.9% PF flush 3 mL    spironolactone (ALDACTONE) half-tab 12.5 mg    torsemide (DEMADEX) tablet 20 mg     Past Medical History:   Diagnosis Date    CHF (congestive heart failure) (H)     DMII (diabetes mellitus, type 2) (H)     Essential hypertension     Morbid obesity (H)     Pulmonary hypertension (H)         Review of Systems: 12 points negative other than above    /65   Pulse 75   Temp 97.6  F (36.4  C) (Oral)   Resp 24   Ht 1.6 m (5' 3\")   Wt 132.5 kg (292 lb 1.8 oz)   LMP  (LMP Unknown)   SpO2 93%   BMI 51.74 kg/m    JVP<7cm, carotids normal  Lungs clear  Cor RRR no c,r,m  Abs soft +BS, no mass  Ext no c,c,e    Lab Results   Component Value Date    HGB 18.1 (H) 12/12/2023     Lab Results   Component Value Date     (L) 12/12/2023     No results found for: \"CREATININE\"  No components found for: \"K\"        Rashaun Rossi MD  Interventional Cardiology   [facility]  [phone]    "

## 2023-12-13 NOTE — PLAN OF CARE
[facility] - ICU    RN Progress Note:            Pertinent Assessments:      Please refer to flowsheet rows for full assessment     Patient more awake, more active today. States she is starting to feel better.           Key Events - This Shift:     O2 titrated from HFNC to nasal cannula at 5L/min this afternoon. O2 sats remain 90-92%. Patient more hypotensive (asymptomatic) this afternoon, Dr. Rossi updated, medications adjusted. Will continue to closely monitor.            Goal Outcome Evaluation:           Problem: Pulmonary Impairment  Goal: Optimal Gas Exchange  Outcome: Progressing     Problem: Pulmonary Impairment  Goal: Improved Activity Tolerance  Outcome: Progressing

## 2023-12-13 NOTE — PROGRESS NOTES
PULMONARY / CRITICAL CARE PROGRESS NOTE    Date / Time of Admission:  12/8/2023  7:30 AM    Assessment:     Sammy Laws is a 53 year old female with history of morbid obesity, Body mass index is 51.74 kg/m ., DM2, HTN, tobacco user.   Presented on 12/8 for elective RHC and LHC. Found to have severe pulm HTN  (mPAP 52 mm Hg, CO 6.09 L/min, PVR 1.12 JOYNER), severely elevated left sided and right sided filling pressures (PCWP 45 mm Hg). Improving slowly with NIPPV, HFNC, aggressive diuresis with ~40lbs weight reduction since admission.     Acute respiratory failure   Decompensated cardiomyopathy   HTN, HFpEF, CAD  Secondary pulmonary hypertension (II,III)  JAMES / OHS  Secondary polycythemia  Bibasal atelectasis   DM  Tobacco user  Morbid obesity Body mass index is 51.74 kg/m .    Advance Directives:  Full code    Plan:   Titrate FiO2, keep SpO2 >88%, HFNC  BiPAP at night and as needed  Bronchodilators as needed  Titrate diuresis   Add acetazolamide  Titrate BP medications  Cardiology service is following   Monitor kidney function   Supplement electrolytes   Advance diet as tolerated  H2 blocker for GI prophylaxis   Glucose level monitoring  DVT prophylaxis lovenox subcutaneous  PT, OT evaluation     Please contact me if you have any questions.    Quincy Kyle  Pulmonary / Critical Care  12/13/2023  7:55 AM        Subjective:   HPI:  Sammy Laws is a 53 year old female with history of morbid obesity, Body mass index is 51.74 kg/m ., DM2, HTN, tobacco user.   Presented on 12/8 for elective RHC and LHC. Found to have severe pulm HTN  (mPAP 52 mm Hg, CO 6.09 L/min, PVR 1.12 JOYNER), severely elevated left sided and right sided filling pressures (PCWP 45 mm Hg). Improving slowly with NIPPV, HFNC, aggressive diuresis with ~40lbs weight reduction since admission.     Events overnight   - Bumex drip was transition to oral diuretics  - Used BiPAP at night, High O2 requirements  - Working with PT    Allergies:  Patient has no known allergies.     MEDS:  Current Facility-Administered Medications   Medication    acetaminophen (TYLENOL) tablet 650 mg    albuterol (PROVENTIL) neb solution 2.5 mg    aspirin EC tablet 162 mg    bisacodyl (DULCOLAX) suppository 10 mg    buPROPion (WELLBUTRIN XL) 24 hr tablet 150 mg    calcium carbonate (TUMS) chewable tablet 500 mg    carboxymethylcellulose PF (REFRESH PLUS) 0.5 % ophthalmic solution 1 drop    carvedilol (COREG) tablet 3.125 mg    glucose gel 15-30 g    Or    dextrose 50 % injection 25-50 mL    Or    glucagon injection 1 mg    docusate sodium (COLACE) capsule 100 mg    enoxaparin ANTICOAGULANT (LOVENOX) injection 40 mg    famotidine (PEPCID) tablet 10 mg    Hold: metformin and metformin containing medications on day of the procedure and for 48 hours after IV contrast given- Patients with acute kidney injury or severe chronic kidney disease (stage IV or stage V; i.e., eGFR less than 30)    hydrALAZINE (APRESOLINE) injection 5 mg    insulin aspart (NovoLOG) injection (RAPID ACTING)    lidocaine (LMX4) cream    lidocaine 1 % 0.1-1 mL    losartan (COZAAR) tablet 50 mg    magnesium oxide (MAG-OX) tablet 400 mg    melatonin tablet 3 mg    metolazone (ZAROXOLYN) tablet 5 mg    naloxone (NARCAN) injection 0.2 mg    Or    naloxone (NARCAN) injection 0.4 mg    Or    naloxone (NARCAN) injection 0.2 mg    Or    naloxone (NARCAN) injection 0.4 mg    nicotine (NICODERM CQ) 21 MG/24HR 24 hr patch 1 patch    nicotine Patch in Place    ondansetron (ZOFRAN) injection 4 mg    polyethylene glycol (MIRALAX) Packet 17 g    rosuvastatin (CRESTOR) tablet 40 mg    sodium chloride (OCEAN) 0.65 % nasal spray 1 spray    sodium chloride (PF) 0.9% PF flush 10-40 mL    sodium chloride (PF) 0.9% PF flush 3 mL    sodium chloride (PF) 0.9% PF flush 3 mL    spironolactone (ALDACTONE) half-tab 12.5 mg    torsemide (DEMADEX) tablet 20 mg     Objective:   VITALS:  /65   Pulse 75   Temp 97.6  F (36.4  C)  "(Oral)   Resp 24   Ht 1.6 m (5' 3\")   Wt 132.5 kg (292 lb 1.8 oz)   LMP  (LMP Unknown)   SpO2 93%   BMI 51.74 kg/m    VENT:  FiO2 (%): 50 %  Resp: 24    EXAM:   Gen: morbidly obese, awake, alert, no distress  HEENT: pink conjunctiva, moist mucosa, Mallampati III/IV  Neck: no thyromegaly, masses or JVD  Lungs: clear  CV: regular, distant, no murmurs or gallops appreciated  Abdomen: soft, NT, BS wnl  Ext: trace edema  Neuro: CN II-XII intact, non focal       Data Review:  Recent Labs   Lab 12/13/23  0628 12/13/23  0157 12/12/23  2004 12/12/23  1550 12/12/23  1245 12/12/23  0747   * 147* 178* 146* 159* 152*        12/13/23 06:28   Sodium 135   Potassium 3.8   Chloride 85 (L)   Carbon Dioxide (CO2) 46 (HH)   Urea Nitrogen 42.9 (H)   Creatinine 0.86   GFR Estimate 81   Calcium 11.6 (H)   Anion Gap 4 (L)   Magnesium 1.7   Glucose 109 (H)      12/12/23 05:34   WBC 10.7   Hemoglobin 18.1 (H)   Hematocrit 64.4 (H)   Platelet Count 128 (L)   RBC Count 7.55 (H)   MCV 85   MCH 24.0 (L)   MCHC 28.1 (L)   RDW 21.2 (H)      XR CHEST 1 VIEW  LOCATION: [facility]  DATE: 12/9/2023  INDICATION: PICC, V tach  COMPARISON: 12/08/2023  IMPRESSION: Right-sided PICC line with tip in the distal SVC. Stable cardiomegaly with unchanged mediastinal contours. Pulmonary vascular congestion with mild interstitial edema persists. Increased opacity in the left lower lobe due to atelectasis versus edema with likely a small left pleural effusion. No pneumothorax.    Echocardiogram 12/12/2023  Left ventricular function is decreased. The ejection fraction is 50-55%  (borderline).  The right ventricle is not well visualized.  The right ventricle is mildly dilated.  Mildly decreased right ventricular systolic function  Small pericardial effusion  There are no echocardiographic indications of cardiac tamponade.  Technically difficult, suboptimal study.    Coronary angiogram 12/8/2023  Multivessel CAD as described " "below:  LM: Normal  LAD: Diffuse mild disease with a focal moderate lesion in the distal LAD.  DFR is positive (0.84) beyond the distal LAD lesion, but DFR pullback shows gradual change in DFR, with no focal \"step-up\" to suggest a benefit of PCI.  LCx: Codominant vessel with severe stenosis downstream in the L PDA.  This is a relatively small vessel at this point (roughly 2 mm in diameter).  RCA: Codominant vessel with chronic subtotal occlusion in the mid vessel, with ALVINA I flow distally.  Severe WHO II (postcapillary) pulmonary hypertension.  Severely elevated pulmonary capillary wedge pressure (~45 mmHg).  Normal cardiac index by Tiana.  __________________________________    By:  Quincy Kyle MD, 12/13/2023  7:55 AM    Primary Care Physician:  Michell Lloyd             "

## 2023-12-13 NOTE — PROGRESS NOTES
Patient on BIPAP overnight. Patient tolerated BIPAP well.   RT will continue to follow.     Gretta Rockwell, RT

## 2023-12-13 NOTE — PLAN OF CARE
[facility] - ICU    RN Progress Note:            Pertinent Assessments:      Please refer to flowsheet rows for full assessment     Up in chair until 2200.  Patient back to be with BiPAP.  SATs 91%-93% on FiO2 of 50%           Key Events - This Shift:       Patient denies any concerns at this time.  No significant events.             Barriers to Discharge / Downgrade:     Should be able to transfer off of ICU.  Still requiring HFNC and BiPAP       Roshan Kyle RN

## 2023-12-13 NOTE — PROGRESS NOTES
Respiratory Care    Pt currently on HFNC 50/50% SpO2 91-92. Wore BiPAP overnight tolerating well. WOB appears comfortable, RR 16, no respiratory distress noted. Will continue to wean settings as able.       Denny Kern, RT

## 2023-12-13 NOTE — CONSULTS
Cardiac Surgery Consultation      Sammy Laws MRN# [id number]   YOB: 1970 Age: 53 year old   Date of Admission: 12/8/2023     Reason for consult: I was asked by Dr. Rossi to evaluate this patient for severe right coronary artery and left posterior descending branch coronary artery disease.           Assessment and Plan:   Ms. Sammy Laws is a 53-year-old woman with severe  severe right coronary artery and left posterior descending branch coronary artery disease. Fortunately, she has a non-dominant right coronary artery and the LPDA is also small and supported by a wrap-around LAD with only moderate distal disease. I do not recommend coronary artery bypass grafting because of the lack of benefit of surgery. I agree with medical management which offers an enormous upside with massive potential benefits in smoking cessation, lipid management, weight loss, cardiac and pulmonary rehab, and eventually, aerobic exercise, all with very low risk.         History of Present Illness:   This patient is a 53 year old female who presents with dyspnea. She underwent coronary angiography which demonstrates severe right coronary artery and left posterior descending branch coronary artery disease. Echocardiography demonstrates preserved biventricular function and no significant valvular abnormality. She has newly diagnosed diabetes mellitus type 2 and she is an active smoker.                Past Medical History:     Past Medical History:   Diagnosis Date    CHF (congestive heart failure) (H)     DMII (diabetes mellitus, type 2) (H)     Essential hypertension     Morbid obesity (H)     Pulmonary hypertension (H)              Past Surgical History:     Past Surgical History:   Procedure Laterality Date    CV CORONARY ANGIOGRAM N/A 12/8/2023    Procedure: Coronary Angiogram;  Surgeon: Abhi Valiente MD;  Location: [facility] CATH LAB CV    CV INSTANTANEOUS WAVE-FREE RATIO N/A 12/8/2023    Procedure:  Instantaneous Wave-Free Ratio;  Surgeon: Abhi Valiente MD;  Location: [facility] CATH LAB CV    CV RIGHT HEART CATH MEASUREMENTS RECORDED N/A 12/8/2023    Procedure: Right Heart Catheterization;  Surgeon: Abhi Valiente MD;  Location: [facility] CATH LAB CV    PICC TRIPLE LUMEN PLACEMENT  12/9/2023               Social History:     Social History     Tobacco Use    Smoking status: Every Day     Packs/day: 1     Types: Cigarettes     Passive exposure: Current    Smokeless tobacco: Never   Substance Use Topics    Alcohol use: Not on file             Family History:   History reviewed. No pertinent family history.          Immunizations:     Immunization History   Administered Date(s) Administered    Hepatitis B, Adult 06/12/1995, 08/11/1995, 05/10/1996    Influenza Vaccine 18-64 (Flublok) 11/10/2022    Influenza Vaccine >6 months,quad, PF 10/18/2023    MMR 12/29/2008, 01/26/2009    TDAP (Adacel,Boostrix) 06/26/2006, 11/01/2023             Allergies:   No Known Allergies          Medications:     Current Facility-Administered Medications Ordered in Epic   Medication Dose Route Frequency Last Rate Last Admin    acetaminophen (TYLENOL) tablet 650 mg  650 mg Oral Q4H PRN   650 mg at 12/12/23 0447    acetaZOLAMIDE (DIAMOX) injection 250 mg  250 mg Intravenous Daily        albuterol (PROVENTIL) neb solution 2.5 mg  2.5 mg Nebulization 4x Daily   2.5 mg at 12/13/23 0724    aspirin EC tablet 162 mg  162 mg Oral Daily   162 mg at 12/12/23 1253    bisacodyl (DULCOLAX) suppository 10 mg  10 mg Rectal Daily PRN        buPROPion (WELLBUTRIN XL) 24 hr tablet 150 mg  150 mg Oral QAM   150 mg at 12/12/23 1001    calcium carbonate (TUMS) chewable tablet 500 mg  500 mg Oral TID PRN        carboxymethylcellulose PF (REFRESH PLUS) 0.5 % ophthalmic solution 1 drop  1 drop Both Eyes TID PRN        carvedilol (COREG) tablet 6.25 mg  6.25 mg Oral BID w/meals        glucose gel 15-30 g  15-30 g Oral Q15 Min PRN        Or     dextrose 50 % injection 25-50 mL  25-50 mL Intravenous Q15 Min PRN        Or    glucagon injection 1 mg  1 mg Subcutaneous Q15 Min PRN        docusate sodium (COLACE) capsule 100 mg  100 mg Oral BID PRN        enoxaparin ANTICOAGULANT (LOVENOX) injection 40 mg  40 mg Subcutaneous Q12H   40 mg at 12/12/23 2116    famotidine (PEPCID) tablet 20 mg  20 mg Oral At Bedtime        Hold: metformin and metformin containing medications on day of the procedure and for 48 hours after IV contrast given- Patients with acute kidney injury or severe chronic kidney disease (stage IV or stage V; i.e., eGFR less than 30)   Does not apply HOLD        hydrALAZINE (APRESOLINE) injection 5 mg  5 mg Intravenous Q6H PRN        insulin aspart (NovoLOG) injection (RAPID ACTING)  1-7 Units Subcutaneous TID AC   1 Units at 12/12/23 1604    lidocaine (LMX4) cream   Topical Q1H PRN        lidocaine 1 % 0.1-1 mL  0.1-1 mL Other Q1H PRN        losartan (COZAAR) tablet 50 mg  50 mg Oral Daily   50 mg at 12/12/23 1001    magnesium oxide (MAG-OX) tablet 400 mg  400 mg Oral Q4H        melatonin tablet 3 mg  3 mg Oral At Bedtime PRN        naloxone (NARCAN) injection 0.2 mg  0.2 mg Intravenous Q2 Min PRN        Or    naloxone (NARCAN) injection 0.4 mg  0.4 mg Intravenous Q2 Min PRN        Or    naloxone (NARCAN) injection 0.2 mg  0.2 mg Intramuscular Q2 Min PRN        Or    naloxone (NARCAN) injection 0.4 mg  0.4 mg Intramuscular Q2 Min PRN        nicotine (NICODERM CQ) 21 MG/24HR 24 hr patch 1 patch  1 patch Transdermal Daily   1 patch at 12/12/23 1538    nicotine Patch in Place   Transdermal Q8H        ondansetron (ZOFRAN) injection 4 mg  4 mg Intravenous Q6H PRN        polyethylene glycol (MIRALAX) Packet 17 g  17 g Oral Daily PRN        rosuvastatin (CRESTOR) tablet 40 mg  40 mg Oral Daily   40 mg at 12/12/23 1253    sodium chloride (OCEAN) 0.65 % nasal spray 1 spray  1 spray Both Nostrils Q1H PRN        sodium chloride (PF) 0.9% PF flush 10-40 mL   10-40 mL Intracatheter Once PRN        sodium chloride (PF) 0.9% PF flush 3 mL  3 mL Intracatheter Q8H   3 mL at 12/13/23 0327    sodium chloride (PF) 0.9% PF flush 3 mL  3 mL Intracatheter q1 min prn        spironolactone (ALDACTONE) half-tab 12.5 mg  12.5 mg Oral BID   12.5 mg at 12/12/23 2116    torsemide (DEMADEX) tablet 20 mg  20 mg Oral BID   20 mg at 12/12/23 2116     Current Outpatient Medications Ordered in Epic   Medication    torsemide (DEMADEX) 20 MG tablet             Review of Systems:     The 10 point Review of Systems is negative other than noted in the HPI            Physical Exam:   Vitals were reviewed  Temp: 97.6  F (36.4  C) Temp src: Oral BP: 107/65 Pulse: 75   Resp: 24 SpO2: 93 % O2 Device: BiPAP/CPAP Oxygen Delivery: 50 LPM  Deferred         Data:   All laboratory data reviewed  All cardiac studies reviewed by me.  All imaging studies reviewed by me.

## 2023-12-13 NOTE — PROGRESS NOTES
[facility]    PROGRESS NOTE - Hospitalist Service    Assessment and Plan  Patient is new to me, Sammy Laws is a 53 year old female with morbid obesity, cad, dm came in for elective cardiac angiogram. During angiogram, was found to have severely elevated pulmonary wedge pressures, decompensated CHF.  Admitted to the ICU on BiPAP, ultimately required Bumex drip for diuresis.  downgraded from ICU 12/10. Ongoing cardiology workup and medication titration. Ideally would not discharge until home AVAPS arranged, Pulm working on this. Hospital Day: 5         Acute on chronic hypercapnic and hypoxic resp failure  -ABG on admit showed severe hypercarbia and acidosis- started on BiPAP in ICU but acidosis remained significant, ongoing adjustment of BiPAP settings by intensivist, requiring very high settings with AVAPS.  After diuresis, now able to tolerate high flow nasal cannula during the day, she will need AVAPS at nighttime. Overnight oximetry study completed last night.  -high risk for not only JAMES (never had sleep study), but also hypoventilation syndrome, as well as has severe pulm htn noted on angio.  This is presumably chronic, patient had not been seeing a doctor in some time  -Pulm working on arrangements for home AVAPS  -Scheduled albuterol nebs  -Avoid hyperoxia     Acute diastolic HF  -UOP was poor with bolus dose diuretics, changed to Bumex drip and given Diuril IV with immediate improvement.  UOP worse again on bolus dose Bumex, Bumex drip resumed last night. >40 lbs down from admission. Now cardiology changing her to PO diuretic plus metolazone and spironolactone. Will want to monitor closely to ensure adequate diuresis on this regimen.  -cardiology following  -Murphy out, voiding well  -Trend BMP  -cardiology considering [facility]     Acute encephalopathy  -Metabolic secondary to hypercarbia  -monitor as indicator of adequacy of NIPPV     Coronary artery disease  -s/p cath, no stents  placed due to poor hemodynamics at time of cath and need for urgent diuresis. Could benefit from stenting of mid RCA and L PDA, cardiology planning to discuss with CV surgery before considering another PCI attempt. Has multiple other areas of disease not amenable to stenting.  -ASA, statin, BB  -CV surgery consult, plan for medical treatment at this point     Diabetes mellitus type 2  -sliding scale  -recent A1c 7.2  -hold po metformin  -cardiology considering Jardiance or Ozempic, no objection to either from my standpoint     Morbid obesity  -BMI 51  -recommend consider outpatient Bariatrics eval, this is a life limiting diagnosis at this point     Hypertension  -home losartan     Depression  -home Wellbutrin     Smoking dependence  -Patch  -Requested help quitting     Nonsustained V. Tach  -Attributed to PICC line, resolved after PICC line was pulled back 2 cm  -Continue to monitor on telemetry for now     50 MINUTES SPENT BY ME on the date of service doing chart review, history, exam, documentation & further activities per the note    Principal Problem:    CHF (congestive heart failure) (H)  Active Problems:    Diabetes mellitus, type 2 (H)    Hypertension, unspecified type    Atherosclerosis of aorta (H24)    Nonspecific abnormal electrocardiogram (ECG) (EKG)    Morbid obesity (H)    SOB (shortness of breath)    Pulmonary hypertension (H)    Nicotine dependence      VTE prophylaxis:  Enoxaparin (Lovenox) SQ  DIET: Orders Placed This Encounter      Diet      Combination Diet Regular Diet; Moderate Consistent Carb (60 g CHO per Meal) Diet; 2 gm NA Diet      Disposition/Barriers to discharge: High flow oxygen, BiPAP  Code Status: Full Code    Subjective:  Measha is feeling about the same today, still has some shortness of breath with mild movement.  Requires high flow oxygen and BiPAP still.    PHYSICAL EXAM  Vitals:    12/11/23 0650 12/12/23 1200 12/13/23 0600   Weight: 132.6 kg (292 lb 5.3 oz) 131.8 kg (290 lb  9.6 oz) 132.5 kg (292 lb 1.8 oz)     B/P:87/60 T:98.2 P:79 R:20     Intake/Output Summary (Last 24 hours) at 12/13/2023 1705  Last data filed at 12/13/2023 1245  Gross per 24 hour   Intake 240 ml   Output 1850 ml   Net -1610 ml      Body mass index is 51.74 kg/m .    Constitutional: awake, alert, cooperative, no apparent distress, and appears stated age  Respiratory: Decreased breath sounds lung bases: Rhonchi.  No wheeze.  Cardiovascular: Normal apical impulse, regular rate and rhythm, normal S1 and S2, no S3 or S4, and no murmur noted  GI: Morbidly obese, no scars, normal bowel sounds, soft, non-distended, non-tender, no masses palpated, no hepatosplenomegally  Skin: no bruising or bleeding and normal skin color, texture, turgor  Musculoskeletal: There is no redness, warmth, or swelling of the joints.  Full range of motion noted.  no lower extremity pitting edema present  Neurologic: Awake, alert, oriented to name, place and time.  Cranial nerves II-XII are grossly intact.  Motor is 5 out of 5 bilaterally.   Sensory is intact.    Neuropsychiatric: Appropriate with examiner      PERTINENT LABS/IMAGING:    I have personally reviewed the following data over the past 24 hrs:    N/A  \   N/A   / N/A     135 85 (L) 42.9 (H) /  132 (H)   3.8 46 (HH) 0.86 \       Imaging results reviewed over the past 24 hrs:   No results found for this or any previous visit (from the past 24 hour(s)).    Discussed with patient, family, pulmonology, nursing staff and discharge planner    Josh Greenberg MD  [facility] Medicine Service  [phone]

## 2023-12-14 ENCOUNTER — APPOINTMENT (OUTPATIENT)
Dept: PHYSICAL THERAPY | Facility: HOSPITAL | Age: 53
DRG: 286 | End: 2023-12-14
Attending: INTERNAL MEDICINE
Payer: COMMERCIAL

## 2023-12-14 LAB
ALBUMIN SERPL BCG-MCNC: 3.5 G/DL (ref 3.5–5.2)
ALBUMIN UR-MCNC: NEGATIVE MG/DL
ALP SERPL-CCNC: 74 U/L (ref 40–150)
ALT SERPL W P-5'-P-CCNC: 13 U/L (ref 0–50)
ANION GAP SERPL CALCULATED.3IONS-SCNC: 7 MMOL/L (ref 7–15)
APPEARANCE UR: CLEAR
AST SERPL W P-5'-P-CCNC: 19 U/L (ref 0–45)
BASE EXCESS BLDV CALC-SCNC: 23.3 MMOL/L
BASOPHILS # BLD AUTO: 0.1 10E3/UL (ref 0–0.2)
BASOPHILS NFR BLD AUTO: 1 %
BILIRUB SERPL-MCNC: 1.1 MG/DL
BILIRUB UR QL STRIP: NEGATIVE
BUN SERPL-MCNC: 54.9 MG/DL (ref 6–20)
CALCIUM SERPL-MCNC: 11.7 MG/DL (ref 8.6–10)
CHLORIDE SERPL-SCNC: 83 MMOL/L (ref 98–107)
COLOR UR AUTO: ABNORMAL
CREAT SERPL-MCNC: 0.87 MG/DL (ref 0.51–0.95)
CRP SERPL-MCNC: 39.1 MG/L
DEPRECATED HCO3 PLAS-SCNC: 43 MMOL/L (ref 22–29)
EGFRCR SERPLBLD CKD-EPI 2021: 80 ML/MIN/1.73M2
EOSINOPHIL # BLD AUTO: 0.3 10E3/UL (ref 0–0.7)
EOSINOPHIL NFR BLD AUTO: 3 %
ERYTHROCYTE [DISTWIDTH] IN BLOOD BY AUTOMATED COUNT: 21.2 % (ref 10–15)
GLUCOSE BLDC GLUCOMTR-MCNC: 131 MG/DL (ref 70–99)
GLUCOSE BLDC GLUCOMTR-MCNC: 138 MG/DL (ref 70–99)
GLUCOSE BLDC GLUCOMTR-MCNC: 141 MG/DL (ref 70–99)
GLUCOSE BLDC GLUCOMTR-MCNC: 150 MG/DL (ref 70–99)
GLUCOSE SERPL-MCNC: 102 MG/DL (ref 70–99)
GLUCOSE UR STRIP-MCNC: NEGATIVE MG/DL
HCO3 BLDV-SCNC: 48 MMOL/L (ref 24–30)
HCT VFR BLD AUTO: 63.6 % (ref 35–47)
HGB BLD-MCNC: 18 G/DL (ref 11.7–15.7)
HGB UR QL STRIP: NEGATIVE
IMM GRANULOCYTES # BLD: 0 10E3/UL
IMM GRANULOCYTES NFR BLD: 0 %
KETONES UR STRIP-MCNC: NEGATIVE MG/DL
LEUKOCYTE ESTERASE UR QL STRIP: NEGATIVE
LYMPHOCYTES # BLD AUTO: 2.6 10E3/UL (ref 0.8–5.3)
LYMPHOCYTES NFR BLD AUTO: 29 %
MAGNESIUM SERPL-MCNC: 1.9 MG/DL (ref 1.7–2.3)
MCH RBC QN AUTO: 23.9 PG (ref 26.5–33)
MCHC RBC AUTO-ENTMCNC: 28.3 G/DL (ref 31.5–36.5)
MCV RBC AUTO: 85 FL (ref 78–100)
MONOCYTES # BLD AUTO: 1 10E3/UL (ref 0–1.3)
MONOCYTES NFR BLD AUTO: 11 %
NEUTROPHILS # BLD AUTO: 5.2 10E3/UL (ref 1.6–8.3)
NEUTROPHILS NFR BLD AUTO: 56 %
NITRATE UR QL: NEGATIVE
NRBC # BLD AUTO: 0 10E3/UL
NRBC BLD AUTO-RTO: 0 /100
OXYHGB MFR BLDV: 70.7 % (ref 70–75)
PCO2 BLDV: 79 MM HG (ref 35–50)
PH BLDV: 7.39 [PH] (ref 7.35–7.45)
PH UR STRIP: 7.5 [PH] (ref 5–7)
PLATELET # BLD AUTO: 138 10E3/UL (ref 150–450)
PO2 BLDV: 43 MM HG (ref 25–47)
POTASSIUM SERPL-SCNC: 3.7 MMOL/L (ref 3.4–5.3)
PROCALCITONIN SERPL IA-MCNC: 0.1 NG/ML
PROT SERPL-MCNC: 6.6 G/DL (ref 6.4–8.3)
RBC # BLD AUTO: 7.53 10E6/UL (ref 3.8–5.2)
RBC URINE: <1 /HPF
SAO2 % BLDV: 72.1 % (ref 70–75)
SODIUM SERPL-SCNC: 133 MMOL/L (ref 135–145)
SP GR UR STRIP: 1.01 (ref 1–1.03)
SQUAMOUS EPITHELIAL: 1 /HPF
UROBILINOGEN UR STRIP-MCNC: <2 MG/DL
WBC # BLD AUTO: 9.3 10E3/UL (ref 4–11)
WBC URINE: 1 /HPF

## 2023-12-14 PROCEDURE — 97116 GAIT TRAINING THERAPY: CPT | Mod: GP

## 2023-12-14 PROCEDURE — 250N000011 HC RX IP 250 OP 636: Mod: JZ | Performed by: INTERNAL MEDICINE

## 2023-12-14 PROCEDURE — 250N000013 HC RX MED GY IP 250 OP 250 PS 637: Performed by: INTERNAL MEDICINE

## 2023-12-14 PROCEDURE — 80053 COMPREHEN METABOLIC PANEL: CPT | Performed by: INTERNAL MEDICINE

## 2023-12-14 PROCEDURE — 85025 COMPLETE CBC W/AUTO DIFF WBC: CPT | Performed by: INTERNAL MEDICINE

## 2023-12-14 PROCEDURE — 82805 BLOOD GASES W/O2 SATURATION: CPT | Performed by: INTERNAL MEDICINE

## 2023-12-14 PROCEDURE — 250N000009 HC RX 250: Performed by: INTERNAL MEDICINE

## 2023-12-14 PROCEDURE — 86140 C-REACTIVE PROTEIN: CPT | Performed by: INTERNAL MEDICINE

## 2023-12-14 PROCEDURE — 97530 THERAPEUTIC ACTIVITIES: CPT | Mod: GP

## 2023-12-14 PROCEDURE — 83735 ASSAY OF MAGNESIUM: CPT | Performed by: HOSPITALIST

## 2023-12-14 PROCEDURE — 94660 CPAP INITIATION&MGMT: CPT

## 2023-12-14 PROCEDURE — 84145 PROCALCITONIN (PCT): CPT | Performed by: INTERNAL MEDICINE

## 2023-12-14 PROCEDURE — 120N000013 HC R&B IMCU

## 2023-12-14 PROCEDURE — 999N000157 HC STATISTIC RCP TIME EA 10 MIN

## 2023-12-14 PROCEDURE — 99207 PR CDG-CUT & PASTE-POTENTIAL IMPACT ON LEVEL: CPT | Performed by: INTERNAL MEDICINE

## 2023-12-14 PROCEDURE — 99232 SBSQ HOSP IP/OBS MODERATE 35: CPT | Performed by: INTERNAL MEDICINE

## 2023-12-14 PROCEDURE — 94640 AIRWAY INHALATION TREATMENT: CPT | Mod: 76

## 2023-12-14 PROCEDURE — 99233 SBSQ HOSP IP/OBS HIGH 50: CPT | Performed by: INTERNAL MEDICINE

## 2023-12-14 PROCEDURE — 81001 URINALYSIS AUTO W/SCOPE: CPT | Performed by: INTERNAL MEDICINE

## 2023-12-14 RX ORDER — CLOPIDOGREL BISULFATE 75 MG/1
75 TABLET ORAL DAILY
Status: DISCONTINUED | OUTPATIENT
Start: 2023-12-15 | End: 2023-12-19 | Stop reason: HOSPADM

## 2023-12-14 RX ORDER — NOREPINEPHRINE BITARTRATE 0.02 MG/ML
.01-.6 INJECTION, SOLUTION INTRAVENOUS CONTINUOUS
Status: DISCONTINUED | OUTPATIENT
Start: 2023-12-14 | End: 2023-12-15

## 2023-12-14 RX ORDER — CLOPIDOGREL BISULFATE 75 MG/1
300 TABLET ORAL ONCE
Qty: 4 TABLET | Refills: 0 | Status: COMPLETED | OUTPATIENT
Start: 2023-12-14 | End: 2023-12-14

## 2023-12-14 RX ORDER — ASPIRIN 81 MG/1
81 TABLET ORAL DAILY
Status: DISCONTINUED | OUTPATIENT
Start: 2023-12-14 | End: 2023-12-19 | Stop reason: HOSPADM

## 2023-12-14 RX ORDER — POTASSIUM CHLORIDE 29.8 MG/ML
20 INJECTION INTRAVENOUS ONCE
Status: COMPLETED | OUTPATIENT
Start: 2023-12-14 | End: 2023-12-14

## 2023-12-14 RX ORDER — MAGNESIUM OXIDE 400 MG/1
400 TABLET ORAL EVERY 4 HOURS
Status: COMPLETED | OUTPATIENT
Start: 2023-12-14 | End: 2023-12-14

## 2023-12-14 RX ORDER — ACETAZOLAMIDE 500 MG/5ML
500 INJECTION, POWDER, LYOPHILIZED, FOR SOLUTION INTRAVENOUS DAILY
Qty: 10 ML | Refills: 0 | Status: COMPLETED | OUTPATIENT
Start: 2023-12-14 | End: 2023-12-15

## 2023-12-14 RX ORDER — FUROSEMIDE 10 MG/ML
40 INJECTION INTRAMUSCULAR; INTRAVENOUS EVERY 8 HOURS
Status: DISCONTINUED | OUTPATIENT
Start: 2023-12-14 | End: 2023-12-15

## 2023-12-14 RX ADMIN — Medication 81 MG: at 09:11

## 2023-12-14 RX ADMIN — MAGNESIUM OXIDE TAB 400 MG (241.3 MG ELEMENTAL MG) 400 MG: 400 (241.3 MG) TAB at 08:52

## 2023-12-14 RX ADMIN — ENOXAPARIN SODIUM 40 MG: 40 INJECTION SUBCUTANEOUS at 20:07

## 2023-12-14 RX ADMIN — ALBUTEROL SULFATE 2.5 MG: 2.5 SOLUTION RESPIRATORY (INHALATION) at 08:45

## 2023-12-14 RX ADMIN — ALBUTEROL SULFATE 2.5 MG: 2.5 SOLUTION RESPIRATORY (INHALATION) at 20:32

## 2023-12-14 RX ADMIN — FAMOTIDINE 20 MG: 20 TABLET ORAL at 20:07

## 2023-12-14 RX ADMIN — TORSEMIDE 20 MG: 20 TABLET ORAL at 10:29

## 2023-12-14 RX ADMIN — ALBUTEROL SULFATE 2.5 MG: 2.5 SOLUTION RESPIRATORY (INHALATION) at 16:13

## 2023-12-14 RX ADMIN — ALBUTEROL SULFATE 2.5 MG: 2.5 SOLUTION RESPIRATORY (INHALATION) at 12:01

## 2023-12-14 RX ADMIN — POTASSIUM CHLORIDE 20 MEQ: 29.8 INJECTION, SOLUTION INTRAVENOUS at 08:52

## 2023-12-14 RX ADMIN — BUPROPION HYDROCHLORIDE 150 MG: 150 TABLET, FILM COATED, EXTENDED RELEASE ORAL at 08:52

## 2023-12-14 RX ADMIN — ACETAZOLAMIDE 500 MG: 500 INJECTION, POWDER, LYOPHILIZED, FOR SOLUTION INTRAVENOUS at 13:00

## 2023-12-14 RX ADMIN — ACETAZOLAMIDE 250 MG: 500 INJECTION, POWDER, LYOPHILIZED, FOR SOLUTION INTRAVENOUS at 10:30

## 2023-12-14 RX ADMIN — ENOXAPARIN SODIUM 40 MG: 40 INJECTION SUBCUTANEOUS at 08:52

## 2023-12-14 RX ADMIN — NICOTINE 1 PATCH: 21 PATCH, EXTENDED RELEASE TRANSDERMAL at 17:04

## 2023-12-14 RX ADMIN — SPIRONOLACTONE 12.5 MG: 25 TABLET ORAL at 20:07

## 2023-12-14 RX ADMIN — SPIRONOLACTONE 12.5 MG: 25 TABLET ORAL at 10:28

## 2023-12-14 RX ADMIN — FUROSEMIDE 40 MG: 10 INJECTION, SOLUTION INTRAMUSCULAR; INTRAVENOUS at 12:59

## 2023-12-14 RX ADMIN — ROSUVASTATIN CALCIUM 40 MG: 40 TABLET, COATED ORAL at 08:52

## 2023-12-14 RX ADMIN — MAGNESIUM OXIDE TAB 400 MG (241.3 MG ELEMENTAL MG) 400 MG: 400 (241.3 MG) TAB at 10:31

## 2023-12-14 RX ADMIN — CLOPIDOGREL BISULFATE 300 MG: 75 TABLET ORAL at 10:28

## 2023-12-14 RX ADMIN — FUROSEMIDE 40 MG: 10 INJECTION, SOLUTION INTRAMUSCULAR; INTRAVENOUS at 20:07

## 2023-12-14 ASSESSMENT — ACTIVITIES OF DAILY LIVING (ADL)
ADLS_ACUITY_SCORE: 30
ADLS_ACUITY_SCORE: 30
ADLS_ACUITY_SCORE: 28
ADLS_ACUITY_SCORE: 30
ADLS_ACUITY_SCORE: 30
ADLS_ACUITY_SCORE: 28
ADLS_ACUITY_SCORE: 30

## 2023-12-14 NOTE — PROGRESS NOTES
Care Management Discharge Note    Discharge Date: 12/15/2023       Discharge Disposition: Home    Discharge Services:      Discharge DME:      Discharge Transportation: family or friend will provide    Private pay costs discussed: Not applicable      Education Provided on the Discharge Plan:  Per Care Team   Persons Notified of Discharge Plans: Yes  Patient/Family in Agreement with the Plan:      Handoff Referral Completed: Yes    Additional Information:  Final discharge plan is for pt to return home with Op follow up including sleep study. Family to transport.       Leora Eagle RN

## 2023-12-14 NOTE — PROGRESS NOTES
[facility]    PROGRESS NOTE - Hospitalist Service    Assessment and Plan  53 year old female with morbid obesity, cad, dm came in for elective cardiac angiogram. During angiogram, was found to have severely elevated pulmonary wedge pressures, decompensated CHF.  Admitted to the ICU on BiPAP, ultimately required Bumex drip for diuresis.  downgraded from ICU 12/10. Ongoing cardiology workup and medication titration. Ideally would not discharge until home AVAPS arranged, still hypoxic and requires 50 L        Acute on chronic hypercapnic and hypoxic resp failure  -ABG on admit showed severe hypercarbia and acidosis- started on BiPAP in ICU but acidosis remained significant, ongoing adjustment of BiPAP settings by intensivist, requiring very high settings with AVAPS.  After diuresis, now able to tolerate high flow nasal cannula during the day, she will need AVAPS at nighttime. Overnight oximetry study completed last night.  - high risk for not only JAMES (never had sleep study), but also hypoventilation syndrome, as well as has severe pulm htn noted on angio.  This is presumably chronic, patient had not been seeing a doctor in some time  - Pulm working on arrangements for home AVAPS  - Scheduled albuterol nebs  - still hypoxic and required 50 L.     Acute diastolic HF  -UOP was poor with bolus dose diuretics, changed to Bumex drip and given Diuril IV with immediate improvement.  UOP worse again on bolus dose Bumex, Bumex drip resumed last night. >40 lbs down from admission. Now cardiology changing her to PO diuretic plus metolazone and spironolactone. Will want to monitor closely to ensure adequate diuresis on this regimen.  -cardiology following  -Murphy out, voiding well  -Trend BMP  -cardiology considering RHC  -Diuresis is currently on hold  -Blood pressure is on the low side and patient started on Levophed drip by pulmonology     Acute encephalopathy  -Metabolic secondary to  hypercarbia  -Resolved with  -monitor as indicator of adequacy of NIPPV     Coronary artery disease  - s/p cath, no stents placed due to poor hemodynamics at time of cath and need for urgent diuresis. Could benefit from stenting of mid RCA and L PDA, cardiology planning to discuss with CV surgery before considering another PCI attempt. Has multiple other areas of disease not amenable to stenting.  - ASA, statin, BB  - CV surgery consult, plan for medical treatment at this point     Diabetes mellitus type 2  -Cover with sliding scale  - recent A1c 7.2  - hold po metformin for now  - cardiology considering Jardiance or Ozempic, no objection to either from my standpoint     Morbid obesity  -BMI 51  -recommend consider outpatient Bariatrics eval, this is a life limiting diagnosis at this point     Hypertension  -home losartan     Depression  -home Wellbutrin     Smoking dependence  -Patch  -Requested help quitting     Nonsustained V. Tach  -Attributed to PICC line, resolved after PICC line was pulled back 2 cm  -Continue to monitor on telemetry for now     35 MINUTES SPENT BY ME on the date of service doing chart review, history, exam, documentation & further activities per the note    Principal Problem:    CHF (congestive heart failure) (H)  Active Problems:    Diabetes mellitus, type 2 (H)    Hypertension, unspecified type    Atherosclerosis of aorta (H24)    Nonspecific abnormal electrocardiogram (ECG) (EKG)    Morbid obesity (H)    SOB (shortness of breath)    Pulmonary hypertension (H)    Nicotine dependence      VTE prophylaxis:  Enoxaparin (Lovenox) SQ  DIET: Orders Placed This Encounter      Diet      Combination Diet Regular Diet; Moderate Consistent Carb (60 g CHO per Meal) Diet; 2 gm NA Diet      Disposition/Barriers to discharge: Hypoxia, hypotension  Code Status: Full Code    Subjective:  Sammy is feeling about the same today, still hypoxic and required 15 L of oxygen.  No chest pain.  Blood pressures in  the low side.    PHYSICAL EXAM  Vitals:    12/12/23 1200 12/13/23 0600 12/14/23 0600   Weight: 131.8 kg (290 lb 9.6 oz) 132.5 kg (292 lb 1.8 oz) 130.2 kg (287 lb 0.6 oz)     B/P:94/53 T:98.3 P:74 R:20     Intake/Output Summary (Last 24 hours) at 12/14/2023 1103  Last data filed at 12/14/2023 0900  Gross per 24 hour   Intake --   Output 2375 ml   Net -2375 ml      Body mass index is 50.85 kg/m .    Constitutional: awake, alert, cooperative, no apparent distress, and appears stated age  Respiratory: Decreased breath sounds on lung bases with scattered rhonchi, no wheeze.  No rales.  Cardiovascular: Normal apical impulse, regular rate and rhythm, normal S1 and S2, no S3 or S4, and no murmur noted  GI: Morbidly obese, no scars, normal bowel sounds, soft, non-distended, non-tender, no masses palpated, no hepatosplenomegally  Skin: no bruising or bleeding and normal skin color, texture, turgor  Musculoskeletal: There is no redness, warmth, or swelling of the joints.  Full range of motion noted.  no lower extremity pitting edema present  Neurologic: Awake, alert, oriented to name, place and time.  Cranial nerves II-XII are grossly intact.  Motor is 5 out of 5 bilaterally.   Sensory is intact.    Neuropsychiatric: Appropriate with examiner      PERTINENT LABS/IMAGING:    I have personally reviewed the following data over the past 24 hrs:    9.3  \   18.0 (H)   / 138 (L)     133 (L) 83 (L) 54.9 (H) /  131 (H)   3.7 43 (H) 0.87 \     ALT: 13 AST: 19 AP: 74 TBILI: 1.1   ALB: 3.5 TOT PROTEIN: 6.6 LIPASE: N/A       Imaging results reviewed over the past 24 hrs:   No results found for this or any previous visit (from the past 24 hour(s)).    Discussed with patient, family, pulmonology, nursing staff and discharge planner    Josh Greenberg MD  [facility] Medicine Service  [phone]

## 2023-12-14 NOTE — PROGRESS NOTES
PULMONARY / CRITICAL CARE PROGRESS NOTE    Date / Time of Admission:  12/8/2023  7:30 AM    Assessment:     Sammy Laws is a 53 year old female with history of morbid obesity, Body mass index is 51.74 kg/m ., DM2, HTN, tobacco user.   Presented on 12/8 for elective RHC and LHC. Found to have severe pulm HTN  (mPAP 52 mm Hg, CO 6.09 L/min, PVR 1.12 JOYNER), severely elevated left sided and right sided filling pressures (PCWP 45 mm Hg). Improving slowly with NIPPV, HFNC, aggressive diuresis with ~40lbs weight reduction since admission.     Acute respiratory failure   Decompensated cardiomyopathy   HTN, HFpEF, CAD  Secondary pulmonary hypertension (II,III)  JAMES / OHS  Secondary polycythemia  Bibasal atelectasis   DM  Tobacco user  Morbid obesity Body mass index is 50.85 kg/m .    Advance Directives:  Full code    Plan:   Titrate FiO2, keep SpO2 >88%, HFNC Nasal cannula 5 LPM   BiPAP at night and as needed  Bronchodilators as needed  Change PO to IV diuresis   Acetazolamide IV   Start NE drip and keep MAP > 65  Cardiology service is following   Monitor kidney function   Supplement electrolytes   Advance diet as tolerated  H2 blocker for GI prophylaxis   Glucose level monitoring  DVT prophylaxis lovenox subcutaneous  PT, OT evaluation     Please contact me if you have any questions.    Quincy Kyle  Pulmonary / Critical Care  12/14/2023  11:06 AM        Subjective:   HPI:  Sammy Laws is a 53 year old female with history of morbid obesity, Body mass index is 51.74 kg/m ., DM2, HTN, tobacco user.   Presented on 12/8 for elective RHC and LHC. Found to have severe pulm HTN  (mPAP 52 mm Hg, CO 6.09 L/min, PVR 1.12 JOYNER), severely elevated left sided and right sided filling pressures (PCWP 45 mm Hg). Improving slowly with NIPPV, HFNC, aggressive diuresis since admission.     Events overnight   - Oral torsemide was changed to lasix IV, monitor Is/Os  - Low Bps, started on NE drip  - Used BiPAP at night, FiO2  50%  - On 5 LPM nasal cannula  - Working with PT    Allergies: Patient has no known allergies.     MEDS:  Current Facility-Administered Medications   Medication    acetaminophen (TYLENOL) tablet 650 mg    acetaZOLAMIDE (DIAMOX) injection 500 mg    albuterol (PROVENTIL) neb solution 2.5 mg    aspirin EC tablet 81 mg    bisacodyl (DULCOLAX) suppository 10 mg    buPROPion (WELLBUTRIN XL) 24 hr tablet 150 mg    calcium carbonate (TUMS) chewable tablet 500 mg    carboxymethylcellulose PF (REFRESH PLUS) 0.5 % ophthalmic solution 1 drop    [START ON 12/15/2023] clopidogrel (PLAVIX) tablet 75 mg    glucose gel 15-30 g    Or    dextrose 50 % injection 25-50 mL    Or    glucagon injection 1 mg    docusate sodium (COLACE) capsule 100 mg    enoxaparin ANTICOAGULANT (LOVENOX) injection 40 mg    famotidine (PEPCID) tablet 20 mg    furosemide (LASIX) injection 40 mg    Hold: metformin and metformin containing medications on day of the procedure and for 48 hours after IV contrast given- Patients with acute kidney injury or severe chronic kidney disease (stage IV or stage V; i.e., eGFR less than 30)    hydrALAZINE (APRESOLINE) injection 5 mg    insulin aspart (NovoLOG) injection (RAPID ACTING)    lidocaine (LMX4) cream    lidocaine 1 % 0.1-1 mL    [Held by provider] losartan (COZAAR) tablet 25 mg    melatonin tablet 3 mg    naloxone (NARCAN) injection 0.2 mg    Or    naloxone (NARCAN) injection 0.4 mg    Or    naloxone (NARCAN) injection 0.2 mg    Or    naloxone (NARCAN) injection 0.4 mg    nicotine (NICODERM CQ) 21 MG/24HR 24 hr patch 1 patch    nicotine Patch in Place    norepinephrine (LEVOPHED) 4 mg in  mL infusion PREMIX    ondansetron (ZOFRAN) injection 4 mg    polyethylene glycol (MIRALAX) Packet 17 g    rosuvastatin (CRESTOR) tablet 40 mg    sodium chloride (OCEAN) 0.65 % nasal spray 1 spray    sodium chloride (PF) 0.9% PF flush 10-40 mL    sodium chloride (PF) 0.9% PF flush 3 mL    sodium chloride (PF) 0.9% PF flush 3  "mL    spironolactone (ALDACTONE) half-tab 12.5 mg     Objective:   VITALS:  BP 94/53 (BP Location: Left arm)   Pulse 74   Temp 98.3  F (36.8  C) (Axillary)   Resp 20   Ht 1.6 m (5' 3\")   Wt 130.2 kg (287 lb 0.6 oz)   LMP  (LMP Unknown)   SpO2 91%   BMI 50.85 kg/m    VENT:  FiO2 (%): 50 %  Resp: 20    EXAM:   Gen: morbidly obese, awake, alert, no distress  HEENT: pink conjunctiva, moist mucosa, Mallampati III/IV  Neck: no thyromegaly, masses or JVD  Lungs: clear  CV: regular, distant, no murmurs or gallops appreciated  Abdomen: soft, NT, BS wnl  Ext: trace edema  Neuro: CN II-XII intact, non focal       Data Review:  Recent Labs   Lab 12/14/23  0918 12/14/23  0628 12/14/23  0214 12/13/23  2043 12/13/23  1712 12/13/23  1244   * 102* 138* 167* 144* 132*        12/13/23 06:28   Sodium 135   Potassium 3.8   Chloride 85 (L)   Carbon Dioxide (CO2) 46 (HH)   Urea Nitrogen 42.9 (H)   Creatinine 0.86   GFR Estimate 81   Calcium 11.6 (H)   Anion Gap 4 (L)   Magnesium 1.7   Glucose 109 (H)      12/12/23 05:34   WBC 10.7   Hemoglobin 18.1 (H)   Hematocrit 64.4 (H)   Platelet Count 128 (L)   RBC Count 7.55 (H)   MCV 85   MCH 24.0 (L)   MCHC 28.1 (L)   RDW 21.2 (H)      XR CHEST 1 VIEW  LOCATION: [facility]  DATE: 12/9/2023  INDICATION: PICC, V tach  COMPARISON: 12/08/2023  IMPRESSION: Right-sided PICC line with tip in the distal SVC. Stable cardiomegaly with unchanged mediastinal contours. Pulmonary vascular congestion with mild interstitial edema persists. Increased opacity in the left lower lobe due to atelectasis versus edema with likely a small left pleural effusion. No pneumothorax.    Echocardiogram 12/12/2023  Left ventricular function is decreased. The ejection fraction is 50-55%  (borderline).  The right ventricle is not well visualized.  The right ventricle is mildly dilated.  Mildly decreased right ventricular systolic function  Small pericardial effusion  There are no " "echocardiographic indications of cardiac tamponade.  Technically difficult, suboptimal study.    Coronary angiogram 12/8/2023  Multivessel CAD as described below:  LM: Normal  LAD: Diffuse mild disease with a focal moderate lesion in the distal LAD.  DFR is positive (0.84) beyond the distal LAD lesion, but DFR pullback shows gradual change in DFR, with no focal \"step-up\" to suggest a benefit of PCI.  LCx: Codominant vessel with severe stenosis downstream in the L PDA.  This is a relatively small vessel at this point (roughly 2 mm in diameter).  RCA: Codominant vessel with chronic subtotal occlusion in the mid vessel, with ALVINA I flow distally.  Severe WHO II (postcapillary) pulmonary hypertension.  Severely elevated pulmonary capillary wedge pressure (~45 mmHg).  Normal cardiac index by Tiana.  __________________________________    By:  Quincy Kyle MD, 12/14/2023  11:06 AM    Primary Care Physician:  Michell Lloyd             "

## 2023-12-14 NOTE — PROGRESS NOTES
Pt was on 5L NC with SpO2 >90%, BS diminished t/o.  Schedule neb given with no adverse reaction.  At 22:30, pt was placed on V60 with AVAPS mode for the night.  Settings: R18 target  EPAP 10 min P12, max P35 and FiO2 50%.  For more info refer to the BiPAP flowsheet.  Rt will continue to follow.

## 2023-12-14 NOTE — PROGRESS NOTES
Imp/plan:  HF s/p diuresis - improved EF to 50-55% echo 12/12, higher BUN this AM, will measure CVP this AM, will stop carvedilol given hypoxia and concern for bronchocontstriction, consider metoprolol tomorrow.  If dry, would lower diuretics.  CAD multivessel dz, high risk for CABG, will start clopidogrel with asp and discuss PCI once pulmonary status improved/stable, cont statin    Current Facility-Administered Medications   Medication    acetaminophen (TYLENOL) tablet 650 mg    acetaZOLAMIDE (DIAMOX) injection 250 mg    albuterol (PROVENTIL) neb solution 2.5 mg    aspirin EC tablet 162 mg    bisacodyl (DULCOLAX) suppository 10 mg    buPROPion (WELLBUTRIN XL) 24 hr tablet 150 mg    calcium carbonate (TUMS) chewable tablet 500 mg    carboxymethylcellulose PF (REFRESH PLUS) 0.5 % ophthalmic solution 1 drop    carvedilol (COREG) tablet 6.25 mg    glucose gel 15-30 g    Or    dextrose 50 % injection 25-50 mL    Or    glucagon injection 1 mg    docusate sodium (COLACE) capsule 100 mg    enoxaparin ANTICOAGULANT (LOVENOX) injection 40 mg    famotidine (PEPCID) tablet 20 mg    Hold: metformin and metformin containing medications on day of the procedure and for 48 hours after IV contrast given- Patients with acute kidney injury or severe chronic kidney disease (stage IV or stage V; i.e., eGFR less than 30)    hydrALAZINE (APRESOLINE) injection 5 mg    insulin aspart (NovoLOG) injection (RAPID ACTING)    lidocaine (LMX4) cream    lidocaine 1 % 0.1-1 mL    losartan (COZAAR) tablet 25 mg    magnesium oxide (MAG-OX) tablet 400 mg    melatonin tablet 3 mg    naloxone (NARCAN) injection 0.2 mg    Or    naloxone (NARCAN) injection 0.4 mg    Or    naloxone (NARCAN) injection 0.2 mg    Or    naloxone (NARCAN) injection 0.4 mg    nicotine (NICODERM CQ) 21 MG/24HR 24 hr patch 1 patch    nicotine Patch in Place    ondansetron (ZOFRAN) injection 4 mg    polyethylene glycol (MIRALAX) Packet 17 g    potassium chloride 20 mEq in 50 mL  "intermittent infusion    rosuvastatin (CRESTOR) tablet 40 mg    sodium chloride (OCEAN) 0.65 % nasal spray 1 spray    sodium chloride (PF) 0.9% PF flush 10-40 mL    sodium chloride (PF) 0.9% PF flush 3 mL    sodium chloride (PF) 0.9% PF flush 3 mL    spironolactone (ALDACTONE) half-tab 12.5 mg    torsemide (DEMADEX) tablet 20 mg     Past Medical History:   Diagnosis Date    CHF (congestive heart failure) (H)     DMII (diabetes mellitus, type 2) (H)     Essential hypertension     Morbid obesity (H)     Pulmonary hypertension (H)         Review of Systems: 12 points negative other than above    BP (!) 88/54   Pulse 74   Temp 98.3  F (36.8  C) (Axillary)   Resp 18   Ht 1.6 m (5' 3\")   Wt 130.2 kg (287 lb 0.6 oz)   LMP  (LMP Unknown)   SpO2 93%   BMI 50.85 kg/m    JVP<7cm, carotids normal  Lungs clear  Cor RRR no c,r,m  Abs soft +BS, no mass  Ext no c,c,e    Lab Results   Component Value Date    HGB 18.0 (H) 12/14/2023     Lab Results   Component Value Date     (L) 12/14/2023     No results found for: \"CREATININE\"  No components found for: \"K\"          Rashaun Rossi MD  Interventional Cardiology   [facility]  [phone]    "

## 2023-12-14 NOTE — PROGRESS NOTES
Patient on BIPAP overnight. Patient tolerating BIPAP well. No changes to current settings. RT will continue to follow.     Gretta Rockwell, RT

## 2023-12-14 NOTE — PROGRESS NOTES
Care Management Follow Up    Length of Stay (days): 6    Expected Discharge Date: 12/15/2023     Concerns to be Addressed: discharge planning       Patient plan of care discussed at interdisciplinary rounds: Yes    Anticipated Discharge Disposition: Home     Anticipated Discharge Services:    Education Provided on the Discharge Plan:  Per care Team   Patient/Family in Agreement with the Plan:  Yes    Referrals Placed by CM/SW:    Private pay costs discussed: Not applicable    Additional Information:  Chart reviewed.    RNCM accidentally placed discharge note  thinking pt was going home, but was going to down grade.      Per bedside RN, pt may still need some IV levo as well so unclear it pt will down grade today at this time.      Leora Eagle RN

## 2023-12-14 NOTE — PROGRESS NOTES
RESPIRATORY CARE NOTE     Patient was on 5 lpm and had an Sp02 of 94%. They received Albuterol x2.   Breath sounds prior to treatment where diminished and post treatment increased aeration upper right .    Patient has a strong cough that is not productive nor congested.    They have completed IS therapy and demonstrated good technique. Reviewed education and usage of bipap and patient is very motivated to comply with therapies. Will order nasal mist for dry nares.  Will continue avaps at night.    RT will continue to assess and monitor cardiopulmonary status.     Evelyne Nieves, RT

## 2023-12-14 NOTE — PLAN OF CARE
[facility] - ICU    RN Progress Note:            Pertinent Assessments:      Please refer to flowsheet rows for full assessment     Patient states she is feeling better.  Able to walk to bathroom with stand by assist.  Using Pure-wick at night.    On 5 liters of oxygen NC while wake.  BiPAP at 50% FiO2 while sleeping.  SATs have been in the low 90's upper 80's.           Key Events - This Shift:       No significant events this shift.               Barriers to Discharge / Downgrade:     Should be able to transfer off ICU.         Roshan Kyle, RN

## 2023-12-14 NOTE — PLAN OF CARE
"  Problem: Adult Inpatient Plan of Care  Goal: Plan of Care Review  Description: The Plan of Care Review/Shift note should be completed every shift.  The Outcome Evaluation is a brief statement about your assessment that the patient is improving, declining, or no change.  This information will be displayed automatically on your shift  note.  Outcome: Progressing  Flowsheets (Taken 12/13/2023 1836)  Plan of Care Reviewed With: patient  Goal: Patient-Specific Goal (Individualized)  Description: You can add care plan individualizations to a care plan. Examples of Individualization might be:  \"Parent requests to be called daily at 9am for status\", \"I have a hard time hearing out of my right ear\", or \"Do not touch me to wake me up as it startles  me\".  Outcome: Progressing  Goal: Absence of Hospital-Acquired Illness or Injury  Outcome: Progressing  Intervention: Identify and Manage Fall Risk  Recent Flowsheet Documentation  Taken 12/13/2023 1600 by Lucy Moreland RN  Safety Promotion/Fall Prevention:   assistive device/personal items within reach   clutter free environment maintained   nonskid shoes/slippers when out of bed   patient and family education   room organization consistent   safety round/check completed  Intervention: Prevent Skin Injury  Recent Flowsheet Documentation  Taken 12/13/2023 1600 by Lucy Moreland RN  Body Position: position changed independently  Goal: Optimal Comfort and Wellbeing  Outcome: Progressing  Intervention: Provide Person-Centered Care  Recent Flowsheet Documentation  Taken 12/13/2023 1600 by Lucy Moreland RN  Trust Relationship/Rapport:   care explained   choices provided   emotional support provided  Goal: Readiness for Transition of Care  Outcome: Progressing   Goal Outcome Evaluation:      Plan of Care Reviewed With: patient      [facility] - ICU    RN Progress Note:            Pertinent Assessments:      Please refer to flowsheet rows for full " assessment     Trevor 5lt NC, up in chair, trevor diet           Key Events - This Shift:       See above        SJN SAT (Sedation Awakening Trial): For use ONLY if intubated    SAT Safety Screen    If FAILED why?    SAT Performed    If FAILED why?               Barriers to Discharge / Downgrade:     Cont on lasix         Point of Contact Update Pt on cell phone at [facility]

## 2023-12-15 ENCOUNTER — APPOINTMENT (OUTPATIENT)
Dept: PHYSICAL THERAPY | Facility: HOSPITAL | Age: 53
DRG: 286 | End: 2023-12-15
Attending: INTERNAL MEDICINE
Payer: COMMERCIAL

## 2023-12-15 ENCOUNTER — APPOINTMENT (OUTPATIENT)
Dept: RADIOLOGY | Facility: HOSPITAL | Age: 53
DRG: 286 | End: 2023-12-15
Attending: INTERNAL MEDICINE
Payer: COMMERCIAL

## 2023-12-15 LAB
ALBUMIN SERPL BCG-MCNC: 3.7 G/DL (ref 3.5–5.2)
ALP SERPL-CCNC: 77 U/L (ref 40–150)
ALT SERPL W P-5'-P-CCNC: 13 U/L (ref 0–50)
ANION GAP SERPL CALCULATED.3IONS-SCNC: 7 MMOL/L (ref 7–15)
AST SERPL W P-5'-P-CCNC: 22 U/L (ref 0–45)
BASOPHILS # BLD AUTO: 0.1 10E3/UL (ref 0–0.2)
BASOPHILS NFR BLD AUTO: 1 %
BILIRUB SERPL-MCNC: 1.1 MG/DL
BUN SERPL-MCNC: 59.8 MG/DL (ref 6–20)
CALCIUM SERPL-MCNC: 11.4 MG/DL (ref 8.6–10)
CHLORIDE SERPL-SCNC: 83 MMOL/L (ref 98–107)
CREAT SERPL-MCNC: 0.89 MG/DL (ref 0.51–0.95)
DEPRECATED HCO3 PLAS-SCNC: 41 MMOL/L (ref 22–29)
EGFRCR SERPLBLD CKD-EPI 2021: 78 ML/MIN/1.73M2
EOSINOPHIL # BLD AUTO: 0.2 10E3/UL (ref 0–0.7)
EOSINOPHIL NFR BLD AUTO: 2 %
ERYTHROCYTE [DISTWIDTH] IN BLOOD BY AUTOMATED COUNT: 21.3 % (ref 10–15)
GLUCOSE BLDC GLUCOMTR-MCNC: 144 MG/DL (ref 70–99)
GLUCOSE BLDC GLUCOMTR-MCNC: 164 MG/DL (ref 70–99)
GLUCOSE BLDC GLUCOMTR-MCNC: 173 MG/DL (ref 70–99)
GLUCOSE SERPL-MCNC: 122 MG/DL (ref 70–99)
HCT VFR BLD AUTO: 63.9 % (ref 35–47)
HGB BLD-MCNC: 18 G/DL (ref 11.7–15.7)
IMM GRANULOCYTES # BLD: 0 10E3/UL
IMM GRANULOCYTES NFR BLD: 0 %
LYMPHOCYTES # BLD AUTO: 2.7 10E3/UL (ref 0.8–5.3)
LYMPHOCYTES NFR BLD AUTO: 29 %
MAGNESIUM SERPL-MCNC: 2.2 MG/DL (ref 1.7–2.3)
MCH RBC QN AUTO: 23.8 PG (ref 26.5–33)
MCHC RBC AUTO-ENTMCNC: 28.2 G/DL (ref 31.5–36.5)
MCV RBC AUTO: 84 FL (ref 78–100)
MONOCYTES # BLD AUTO: 0.8 10E3/UL (ref 0–1.3)
MONOCYTES NFR BLD AUTO: 9 %
NEUTROPHILS # BLD AUTO: 5.4 10E3/UL (ref 1.6–8.3)
NEUTROPHILS NFR BLD AUTO: 59 %
NRBC # BLD AUTO: 0 10E3/UL
NRBC BLD AUTO-RTO: 0 /100
PLATELET # BLD AUTO: 130 10E3/UL (ref 150–450)
POTASSIUM SERPL-SCNC: 3.4 MMOL/L (ref 3.4–5.3)
POTASSIUM SERPL-SCNC: 4.3 MMOL/L (ref 3.4–5.3)
PROT SERPL-MCNC: 6.8 G/DL (ref 6.4–8.3)
RBC # BLD AUTO: 7.57 10E6/UL (ref 3.8–5.2)
SODIUM SERPL-SCNC: 131 MMOL/L (ref 135–145)
WBC # BLD AUTO: 9.8 10E3/UL (ref 4–11)

## 2023-12-15 PROCEDURE — 250N000013 HC RX MED GY IP 250 OP 250 PS 637: Performed by: INTERNAL MEDICINE

## 2023-12-15 PROCEDURE — 94640 AIRWAY INHALATION TREATMENT: CPT | Mod: 76

## 2023-12-15 PROCEDURE — 83735 ASSAY OF MAGNESIUM: CPT | Performed by: INTERNAL MEDICINE

## 2023-12-15 PROCEDURE — 94660 CPAP INITIATION&MGMT: CPT

## 2023-12-15 PROCEDURE — 120N000013 HC R&B IMCU

## 2023-12-15 PROCEDURE — 999N000157 HC STATISTIC RCP TIME EA 10 MIN

## 2023-12-15 PROCEDURE — 250N000011 HC RX IP 250 OP 636: Mod: JZ | Performed by: INTERNAL MEDICINE

## 2023-12-15 PROCEDURE — 71045 X-RAY EXAM CHEST 1 VIEW: CPT

## 2023-12-15 PROCEDURE — 99232 SBSQ HOSP IP/OBS MODERATE 35: CPT | Performed by: INTERNAL MEDICINE

## 2023-12-15 PROCEDURE — 94640 AIRWAY INHALATION TREATMENT: CPT

## 2023-12-15 PROCEDURE — 99233 SBSQ HOSP IP/OBS HIGH 50: CPT | Performed by: INTERNAL MEDICINE

## 2023-12-15 PROCEDURE — 99207 PR CDG-CUT & PASTE-POTENTIAL IMPACT ON LEVEL: CPT | Performed by: INTERNAL MEDICINE

## 2023-12-15 PROCEDURE — 250N000009 HC RX 250: Performed by: INTERNAL MEDICINE

## 2023-12-15 PROCEDURE — 97116 GAIT TRAINING THERAPY: CPT | Mod: GP

## 2023-12-15 PROCEDURE — 82040 ASSAY OF SERUM ALBUMIN: CPT | Performed by: INTERNAL MEDICINE

## 2023-12-15 PROCEDURE — 85025 COMPLETE CBC W/AUTO DIFF WBC: CPT | Performed by: INTERNAL MEDICINE

## 2023-12-15 PROCEDURE — 97530 THERAPEUTIC ACTIVITIES: CPT | Mod: GP

## 2023-12-15 PROCEDURE — 84132 ASSAY OF SERUM POTASSIUM: CPT | Performed by: INTERNAL MEDICINE

## 2023-12-15 RX ORDER — POTASSIUM CHLORIDE 1500 MG/1
40 TABLET, EXTENDED RELEASE ORAL ONCE
Status: COMPLETED | OUTPATIENT
Start: 2023-12-15 | End: 2023-12-15

## 2023-12-15 RX ORDER — FENTANYL CITRATE 50 UG/ML
25 INJECTION, SOLUTION INTRAMUSCULAR; INTRAVENOUS
Status: CANCELLED | OUTPATIENT
Start: 2023-12-18

## 2023-12-15 RX ORDER — LIDOCAINE 40 MG/G
CREAM TOPICAL
Status: CANCELLED | OUTPATIENT
Start: 2023-12-15

## 2023-12-15 RX ORDER — FUROSEMIDE 10 MG/ML
40 INJECTION INTRAMUSCULAR; INTRAVENOUS EVERY 12 HOURS
Status: DISCONTINUED | OUTPATIENT
Start: 2023-12-15 | End: 2023-12-16

## 2023-12-15 RX ADMIN — ALBUTEROL SULFATE 2.5 MG: 2.5 SOLUTION RESPIRATORY (INHALATION) at 15:05

## 2023-12-15 RX ADMIN — FUROSEMIDE 40 MG: 10 INJECTION, SOLUTION INTRAMUSCULAR; INTRAVENOUS at 17:12

## 2023-12-15 RX ADMIN — ENOXAPARIN SODIUM 40 MG: 40 INJECTION SUBCUTANEOUS at 08:41

## 2023-12-15 RX ADMIN — NICOTINE 1 PATCH: 21 PATCH, EXTENDED RELEASE TRANSDERMAL at 17:14

## 2023-12-15 RX ADMIN — ACETAMINOPHEN 650 MG: 325 TABLET ORAL at 12:31

## 2023-12-15 RX ADMIN — CLOPIDOGREL BISULFATE 75 MG: 75 TABLET ORAL at 08:41

## 2023-12-15 RX ADMIN — POTASSIUM CHLORIDE 40 MEQ: 1500 TABLET, EXTENDED RELEASE ORAL at 05:33

## 2023-12-15 RX ADMIN — ALBUTEROL SULFATE 2.5 MG: 2.5 SOLUTION RESPIRATORY (INHALATION) at 09:43

## 2023-12-15 RX ADMIN — SPIRONOLACTONE 12.5 MG: 25 TABLET ORAL at 21:44

## 2023-12-15 RX ADMIN — ROSUVASTATIN CALCIUM 40 MG: 40 TABLET, COATED ORAL at 08:41

## 2023-12-15 RX ADMIN — FUROSEMIDE 40 MG: 10 INJECTION, SOLUTION INTRAMUSCULAR; INTRAVENOUS at 03:35

## 2023-12-15 RX ADMIN — FAMOTIDINE 20 MG: 20 TABLET ORAL at 20:14

## 2023-12-15 RX ADMIN — BUPROPION HYDROCHLORIDE 150 MG: 150 TABLET, FILM COATED, EXTENDED RELEASE ORAL at 08:41

## 2023-12-15 RX ADMIN — SPIRONOLACTONE 12.5 MG: 25 TABLET ORAL at 08:42

## 2023-12-15 RX ADMIN — ACETAZOLAMIDE 500 MG: 500 INJECTION, POWDER, LYOPHILIZED, FOR SOLUTION INTRAVENOUS at 08:42

## 2023-12-15 RX ADMIN — ALBUTEROL SULFATE 2.5 MG: 2.5 SOLUTION RESPIRATORY (INHALATION) at 20:59

## 2023-12-15 RX ADMIN — ENOXAPARIN SODIUM 40 MG: 40 INJECTION SUBCUTANEOUS at 20:14

## 2023-12-15 RX ADMIN — Medication 81 MG: at 08:41

## 2023-12-15 ASSESSMENT — ACTIVITIES OF DAILY LIVING (ADL)
ADLS_ACUITY_SCORE: 30

## 2023-12-15 NOTE — PLAN OF CARE
[facility] - ICU    RN Progress Note:            Pertinent Assessments:      Please refer to flowsheet rows for full assessment     Afebrile.  Up to commode with assist of 1.  Ambulated around norris multiple times with PT/OT.  Good urine output.            Key Events - This Shift:       Blood pressures soft this morning and plans for aggressive diuresis.  Monitoring CVP to assess fluid status; remains 18-20's.  Levophed ordered but blood pressures have maintained within desired limits and holding at this time.  4-5L nasal cannula during the day and plans to go back on BIPAP later this evening.                Barriers to Discharge / Downgrade:     CVP monitoring/possible pressor needs.          Point of Contact Update YES-OR-NO: Yes  If No, reason:   Name:John  Phone Number:see chart  Summary of Conversation: at bedside this evening; updated on plan of care.  Supportive of patient.

## 2023-12-15 NOTE — PROGRESS NOTES
Doing well. U/S 3 weeks.   [facility]    PROGRESS NOTE - Hospitalist Service    Assessment and Plan  53 year old female with morbid obesity, cad, dm came in for elective cardiac angiogram. During angiogram, was found to have severely elevated pulmonary wedge pressures, decompensated CHF.  Admitted to the ICU on BiPAP, ultimately required Bumex drip for diuresis.  downgraded from ICU 12/10. Ongoing cardiology workup and medication titration. Ideally would not discharge until home AVAPS arranged, still hypoxic and requires 50 L        Acute on chronic hypercapnic and hypoxic resp failure  -ABG on admit showed severe hypercarbia and acidosis- started on BiPAP in ICU but acidosis remained significant, ongoing adjustment of BiPAP settings by intensivist, requiring very high settings with AVAPS.  After diuresis, now able to tolerate high flow nasal cannula during the day, she will need AVAPS at nighttime. Overnight oximetry study completed last night.  - high risk for not only JAMES (never had sleep study), but also hypoventilation syndrome, as well as has severe pulm htn noted on angio.  This is presumably chronic, patient had not been seeing a doctor in some time  - Pulm working on arrangements for home AVAPS  - Scheduled albuterol nebs  - was severely hypoxic and required 50 L.  -Significant improvement today on the oxygen requirement is down to 5 L     Acute diastolic HF  -UOP was poor with bolus dose diuretics, changed to Bumex drip and given Diuril IV with immediate improvement.  UOP worse again on bolus dose Bumex, Bumex drip resumed last night. >40 lbs down from admission. Now cardiology changing her to PO diuretic plus metolazone and spironolactone. Will want to monitor closely to ensure adequate diuresis on this regimen.  -cardiology following  -Murphy out, voiding well  -Trend BMP  -cardiology considering RHC  -Diuresis is currently on hold  -Blood pressure is on the low side and patient   -  Levophed drip is  ordered by pulmonology but never was started as blood pressure is stable     Acute encephalopathy  -Metabolic secondary to hypercarbia  -Resolved with  -monitor as indicator of adequacy of NIPPV     Coronary artery disease  - s/p cath, no stents placed due to poor hemodynamics at time of cath and need for urgent diuresis. Could benefit from stenting of mid RCA and L PDA, cardiology planning to discuss with CV surgery before considering another PCI attempt. Has multiple other areas of disease not amenable to stenting.  - ASA, statin, BB  - CV surgery consult, plan for medical treatment at this point     Diabetes mellitus type 2  -Cover with sliding scale  - recent A1c 7.2  - hold po metformin for now  - cardiology considering Jardiance or Ozempic, no objection to either from my standpoint     Morbid obesity  -BMI 51  -recommend consider outpatient Bariatrics eval, this is a life limiting diagnosis at this point     Hypertension  -home losartan     Depression  -home Wellbutrin     Smoking dependence  -Patch  -Requested help quitting     Nonsustained V. Tach  -Attributed to PICC line, resolved after PICC line was pulled back 2 cm  -Continue to monitor on telemetry for now    Polycythemia  -Secondary to chronic hypoxia  -Stable hemoglobin around 18     35 MINUTES SPENT BY ME on the date of service doing chart review, history, exam, documentation & further activities per the note    Principal Problem:    CHF (congestive heart failure) (H)  Active Problems:    Diabetes mellitus, type 2 (H)    Hypertension, unspecified type    Atherosclerosis of aorta (H24)    Nonspecific abnormal electrocardiogram (ECG) (EKG)    Morbid obesity (H)    SOB (shortness of breath)    Pulmonary hypertension (H)    Nicotine dependence      VTE prophylaxis:  Enoxaparin (Lovenox) SQ  DIET: Orders Placed This Encounter      Diet      Combination Diet Regular Diet; Moderate Consistent Carb (60 g CHO per Meal) Diet; 2 gm NA  Diet      Disposition/Barriers to discharge: Hypoxia, IV diuresis  Code Status: Full Code    Subjective:  Sammy is feeling much better today, significant improvement of shortness of breath and hypoxia.  Denies any chest pain.    PHYSICAL EXAM  Vitals:    12/13/23 0600 12/14/23 0600 12/15/23 0400   Weight: 132.5 kg (292 lb 1.8 oz) 130.2 kg (287 lb 0.6 oz) 128.1 kg (282 lb 6.6 oz)     B/P:107/67 T:98.2 P:78 R:20     Intake/Output Summary (Last 24 hours) at 12/15/2023 1648  Last data filed at 12/15/2023 1300  Gross per 24 hour   Intake 1680 ml   Output 2800 ml   Net -1120 ml      Body mass index is 50.03 kg/m .    Constitutional: awake, alert, cooperative, no apparent distress, and appears stated age  Respiratory: No increased work of breathing, good air exchange, clear to auscultation bilaterally, no crackles or wheezing  Cardiovascular: Normal apical impulse, regular rate and rhythm, normal S1 and S2, no S3 or S4, and no murmur noted  GI: Morbidly obese, no scars, normal bowel sounds, soft, non-distended, non-tender, no masses palpated, no hepatosplenomegally  Skin: no bruising or bleeding and normal skin color, texture, turgor  Musculoskeletal: There is no redness, warmth, or swelling of the joints.  Full range of motion noted.  Bilateral proximal legs edema  Neurologic: Awake, alert, oriented to name, place and time.  Cranial nerves II-XII are grossly intact.  Motor is 5 out of 5 bilaterally.   Sensory is intact.    Neuropsychiatric: Appropriate with examiner      PERTINENT LABS/IMAGING:    I have personally reviewed the following data over the past 24 hrs:    9.8  \   18.0 (H)   / 130 (L)     131 (L) 83 (L) 59.8 (H) /  173 (H)   4.3 41 (H) 0.89 \     ALT: 13 AST: 22 AP: 77 TBILI: 1.1   ALB: 3.7 TOT PROTEIN: 6.8 LIPASE: N/A       Imaging results reviewed over the past 24 hrs:   Recent Results (from the past 24 hour(s))   XR Chest 1 View    Narrative    EXAM: XR CHEST 1 VIEW  LOCATION: [facility]  DATE: 12/15/2023    INDICATION: Increase O2 requirements. Follow up lung infiltrates  COMPARISON: 12/09/2023      Impression    IMPRESSION: Right upper extremity PICC line catheter is in good position. Better inspiration. Bibasilar atelectasis, slightly improved on the left. Less pulmonary vascular congestion. Heart is normal size.       Discussed with patient, family, pulmonology, nursing staff and discharge planner    Josh Greenberg MD  [facility] Medicine Service  [phone]

## 2023-12-15 NOTE — PROGRESS NOTES
Patient seen on 5lpm nasal cannula. Pt was given Albuteral nebulizer treatments as ordered. Breath sounds diminished pre and post treatments. Pt was placed on Bipap on AVAPS as charted for sleep. Pt is tolerating well. RT following.    Shelly Swan, RT

## 2023-12-15 NOTE — PROGRESS NOTES
Imp/plan:  HF s/p diuresis - improved EF to 50-55% echo 12/12, higher BUN,drop in Na this AM, noted yesterday CVP elevated, off carvedilol given hypoxia and concern for bronchocontstriction, on iv furosemide 40mg q8hr, spironolactone 12.5mg bid, diamox 500mg daily.  LVEDP on Monday to check filling pressures   CAD multivessel dz, high risk for CABG, on clopidogrel with asp, total occlusion of OM3 and diffues dz in left PL branch, critical mid RCA narrowing - would favor PCI to mid RCA Monday and repeat angiography left coronary to reassess if circ territory option for staged PCI.     Followed by Dr. Miranda in Cardiology  Current Facility-Administered Medications   Medication    acetaminophen (TYLENOL) tablet 650 mg    albuterol (PROVENTIL) neb solution 2.5 mg    aspirin EC tablet 81 mg    bisacodyl (DULCOLAX) suppository 10 mg    buPROPion (WELLBUTRIN XL) 24 hr tablet 150 mg    calcium carbonate (TUMS) chewable tablet 500 mg    carboxymethylcellulose PF (REFRESH PLUS) 0.5 % ophthalmic solution 1 drop    clopidogrel (PLAVIX) tablet 75 mg    glucose gel 15-30 g    Or    dextrose 50 % injection 25-50 mL    Or    glucagon injection 1 mg    docusate sodium (COLACE) capsule 100 mg    enoxaparin ANTICOAGULANT (LOVENOX) injection 40 mg    famotidine (PEPCID) tablet 20 mg    furosemide (LASIX) injection 40 mg    Hold: metformin and metformin containing medications on day of the procedure and for 48 hours after IV contrast given- Patients with acute kidney injury or severe chronic kidney disease (stage IV or stage V; i.e., eGFR less than 30)    hydrALAZINE (APRESOLINE) injection 5 mg    insulin aspart (NovoLOG) injection (RAPID ACTING)    lidocaine (LMX4) cream    lidocaine 1 % 0.1-1 mL    [Held by provider] losartan (COZAAR) tablet 25 mg    melatonin tablet 3 mg    naloxone (NARCAN) injection 0.2 mg    Or    naloxone (NARCAN) injection 0.4 mg    Or    naloxone (NARCAN) injection 0.2 mg    Or    naloxone (NARCAN) injection  "0.4 mg    nicotine (NICODERM CQ) 21 MG/24HR 24 hr patch 1 patch    nicotine Patch in Place    ondansetron (ZOFRAN) injection 4 mg    polyethylene glycol (MIRALAX) Packet 17 g    rosuvastatin (CRESTOR) tablet 40 mg    sodium chloride (OCEAN) 0.65 % nasal spray 1 spray    sodium chloride (OCEAN) 0.65 % nasal spray 2 spray    sodium chloride (PF) 0.9% PF flush 10-40 mL    sodium chloride (PF) 0.9% PF flush 3 mL    sodium chloride (PF) 0.9% PF flush 3 mL    spironolactone (ALDACTONE) half-tab 12.5 mg     Past Medical History:   Diagnosis Date    CHF (congestive heart failure) (H)     DMII (diabetes mellitus, type 2) (H)     Essential hypertension     Morbid obesity (H)     Pulmonary hypertension (H)         Review of Systems: 12 points negative other than above    /63   Pulse 83   Temp 98.2  F (36.8  C) (Oral)   Resp 20   Ht 1.6 m (5' 3\")   Wt 128.1 kg (282 lb 6.6 oz)   LMP  (LMP Unknown)   SpO2 90%   BMI 50.03 kg/m    JVP<7cm, carotids normal  Lungs clear  Cor RRR no c,r,m  Abs soft +BS, no mass  Ext no c,c,e    Lab Results   Component Value Date    HGB 18.0 (H) 12/15/2023     Lab Results   Component Value Date     (L) 12/15/2023     No results found for: \"CREATININE\"  No components found for: \"K\"        Rsahaun Rossi MD  Interventional Cardiology   [facility]  [phone]    "

## 2023-12-15 NOTE — PLAN OF CARE
Goal Outcome Evaluation:  [facility] - ICU    RN Progress Note:            Pertinent Assessments:      Please refer to flowsheet rows for full assessment     Alert & oriented x4. Stable BP and overall VS this shift. Placed on BiPAP at bedtime.            Key Events - This Shift:       Uneventful.             Barriers to Discharge / Downgrade:     Need for diuresis,  CVP monitoring.

## 2023-12-15 NOTE — PROGRESS NOTES
"Care Management Follow Up    Length of Stay (days): 7    Expected Discharge Date: 12/15/2023     Concerns to be Addressed:    discharge planning    Patient plan of care discussed at interdisciplinary rounds: Yes    Anticipated Discharge Disposition: Home     Anticipated Discharge Services:    Education Provided on the Discharge Plan:  Per Care Team   Patient/Family in Agreement with the Plan:  Yes    Referrals Placed by CM/SW:    Private pay costs discussed: Not applicable    Additional Information:  Chart reviewed.    Cm updates:  Per bedside RN note barrier to discharge, \"need for diuresis and CVP monitoring.\"      Social Hx:  \"Lives with sig other, independent at baseline. Therapy rec home . Will need outpatient sleep study (pulm to arrange).\"      Cm will continue to follow plan of care, review recommendations, and assist with any discharge needs anticipated.     Leora Eagle RN      "

## 2023-12-15 NOTE — PROGRESS NOTES
CLINICAL NUTRITION SERVICES    Reviewed nutrition risk factors due to LOS. Pt is tolerating diet, eating well per nursing documentation.   Patient new to consistent carb, low sodium diet.  We discussed the low sodium, consistent carb diet.  Discussed what foods are carbohydrates, how carbohydrates affect blood sugar, portion size of carbohydrates, portions per meal.    Provided written diet guidelines-Meal Plate placemat with portion sizes, Low sodium nutrition Therapy, Herb flavoring ideas.     Consider RD outpatient referral with new DX of DM and CHF.     RD will follow via rounds at this time, unless consulted.

## 2023-12-15 NOTE — PROGRESS NOTES
PULMONARY / CRITICAL CARE PROGRESS NOTE    Date / Time of Admission:  12/8/2023  7:30 AM    Assessment:     Sammy Laws is a 53 year old female with history of morbid obesity, Body mass index is 51.74 kg/m ., DM2, HTN, tobacco user.   Presented on 12/8 for elective RHC and LHC. Found to have severe pulm HTN  (mPAP 52 mm Hg, CO 6.09 L/min, PVR 1.12 JOYNER), severely elevated left sided and right sided filling pressures (PCWP 45 mm Hg). Improving slowly with NIPPV, HFNC, aggressive diuresis with ~40lbs weight reduction since admission.     Acute respiratory failure   Decompensated cardiomyopathy   LLL atelectasis / infiltrates   Afebrile, normal WBC. Negative procalcitonin, elevated CRP  Follow up CXR.   HTN, HFpEF, CAD  Secondary pulmonary hypertension (II,III)  JAMES / OHS  Secondary polycythemia  Bibasal atelectasis   DM  Tobacco user  Morbid obesity Body mass index is 50.03 kg/m .    Advance Directives:  Full code    Plan:   Titrate FiO2, keep SpO2 >88%, currently on nasal cannula 5 LPM   BiPAP at night and as needed  Bronchodilators as needed  Follow up CXR  Follow CRP, CBC with diff  Decrease IV diuresis   NE drip as needed  Cardiology service is following   Monitor kidney function   Supplement electrolytes   Advance diet as tolerated  H2 blocker for GI prophylaxis   Glucose level monitoring  DVT prophylaxis lovenox subcutaneous  PT, OT evaluation     Please contact me if you have any questions.    Quincy Kyle  Pulmonary / Critical Care  12/15/2023  11:47 AM        Subjective:   HPI:  Sammy Laws is a 53 year old female with history of morbid obesity, Body mass index is 51.74 kg/m ., DM2, HTN, tobacco user.   Presented on 12/8 for elective RHC and LHC. Found to have severe pulm HTN  (mPAP 52 mm Hg, CO 6.09 L/min, PVR 1.12 JOYNER), severely elevated left sided and right sided filling pressures (PCWP 45 mm Hg). Improving slowly with NIPPV, HFNC, aggressive diuresis since admission.     Events  overnight   - On IV diuresis   - No pressors  - Used BiPAP at night, FiO2 50%  - On 5 LPM nasal cannula  - Working with PT    Allergies: Patient has no known allergies.     MEDS:  Current Facility-Administered Medications   Medication    acetaminophen (TYLENOL) tablet 650 mg    albuterol (PROVENTIL) neb solution 2.5 mg    aspirin EC tablet 81 mg    bisacodyl (DULCOLAX) suppository 10 mg    buPROPion (WELLBUTRIN XL) 24 hr tablet 150 mg    calcium carbonate (TUMS) chewable tablet 500 mg    carboxymethylcellulose PF (REFRESH PLUS) 0.5 % ophthalmic solution 1 drop    clopidogrel (PLAVIX) tablet 75 mg    glucose gel 15-30 g    Or    dextrose 50 % injection 25-50 mL    Or    glucagon injection 1 mg    docusate sodium (COLACE) capsule 100 mg    enoxaparin ANTICOAGULANT (LOVENOX) injection 40 mg    famotidine (PEPCID) tablet 20 mg    furosemide (LASIX) injection 40 mg    Hold: metformin and metformin containing medications on day of the procedure and for 48 hours after IV contrast given- Patients with acute kidney injury or severe chronic kidney disease (stage IV or stage V; i.e., eGFR less than 30)    hydrALAZINE (APRESOLINE) injection 5 mg    insulin aspart (NovoLOG) injection (RAPID ACTING)    lidocaine (LMX4) cream    lidocaine 1 % 0.1-1 mL    [Held by provider] losartan (COZAAR) tablet 25 mg    melatonin tablet 3 mg    naloxone (NARCAN) injection 0.2 mg    Or    naloxone (NARCAN) injection 0.4 mg    Or    naloxone (NARCAN) injection 0.2 mg    Or    naloxone (NARCAN) injection 0.4 mg    nicotine (NICODERM CQ) 21 MG/24HR 24 hr patch 1 patch    nicotine Patch in Place    ondansetron (ZOFRAN) injection 4 mg    polyethylene glycol (MIRALAX) Packet 17 g    rosuvastatin (CRESTOR) tablet 40 mg    sodium chloride (OCEAN) 0.65 % nasal spray 1 spray    sodium chloride (OCEAN) 0.65 % nasal spray 2 spray    sodium chloride (PF) 0.9% PF flush 10-40 mL    sodium chloride (PF) 0.9% PF flush 3 mL    sodium chloride (PF) 0.9% PF flush 3  "mL    spironolactone (ALDACTONE) half-tab 12.5 mg     Objective:   VITALS:  /63   Pulse 83   Temp 98.2  F (36.8  C) (Oral)   Resp 20   Ht 1.6 m (5' 3\")   Wt 128.1 kg (282 lb 6.6 oz)   LMP  (LMP Unknown)   SpO2 90%   BMI 50.03 kg/m    VENT:  FiO2 (%): 50 %  Resp: 20    EXAM:   Gen: morbidly obese, awake, alert, no distress  HEENT: pink conjunctiva, moist mucosa, Mallampati III/IV  Neck: no thyromegaly, masses or JVD  Lungs: decrease breath sounds a the bases  CV: regular, distant, no murmurs or gallops appreciated  Abdomen: soft, NT, BS wnl  Ext: trace edema  Neuro: CN II-XII intact, non focal       Data Review:  Recent Labs   Lab 12/15/23  0905 12/15/23  0344 12/14/23  2211 12/14/23  1642 12/14/23  0918 12/14/23  0628   * 122* 141* 150* 131* 102*        12/15/23 03:44   Sodium 131 (L)   Potassium 3.4   Chloride 83 (L)   Carbon Dioxide (CO2) 41 (H)   Urea Nitrogen 59.8 (H)   Creatinine 0.89   GFR Estimate 78   Calcium 11.4 (H)   Anion Gap 7   Magnesium 2.2   Albumin 3.7   Protein Total 6.8   Alkaline Phosphatase 77   ALT 13   AST 22   Bilirubin Total 1.1   Glucose 122 (H)   WBC 9.8   Hemoglobin 18.0 (H)   Hematocrit 63.9 (H)   Platelet Count 130 (L)   RBC Count 7.57 (H)   MCV 84   MCH 23.8 (L)   MCHC 28.2 (L)   RDW 21.3 (H)   % Neutrophils 59   % Lymphocytes 29   % Monocytes 9   % Eosinophils 2   % Basophils 1        XR CHEST 1 VIEW  LOCATION: [facility]  DATE: 12/9/2023  INDICATION: PICC, V tach  COMPARISON: 12/08/2023  IMPRESSION: Right-sided PICC line with tip in the distal SVC. Stable cardiomegaly with unchanged mediastinal contours. Pulmonary vascular congestion with mild interstitial edema persists. Increased opacity in the left lower lobe due to atelectasis versus edema with likely a small left pleural effusion. No pneumothorax.    Echocardiogram 12/12/2023  Left ventricular function is decreased. The ejection fraction is 50-55%  (borderline).  The right ventricle " "is not well visualized.  The right ventricle is mildly dilated.  Mildly decreased right ventricular systolic function  Small pericardial effusion  There are no echocardiographic indications of cardiac tamponade.  Technically difficult, suboptimal study.    Coronary angiogram 12/8/2023  Multivessel CAD as described below:  LM: Normal  LAD: Diffuse mild disease with a focal moderate lesion in the distal LAD.  DFR is positive (0.84) beyond the distal LAD lesion, but DFR pullback shows gradual change in DFR, with no focal \"step-up\" to suggest a benefit of PCI.  LCx: Codominant vessel with severe stenosis downstream in the L PDA.  This is a relatively small vessel at this point (roughly 2 mm in diameter).  RCA: Codominant vessel with chronic subtotal occlusion in the mid vessel, with ALVINA I flow distally.  Severe WHO II (postcapillary) pulmonary hypertension.  Severely elevated pulmonary capillary wedge pressure (~45 mmHg).  Normal cardiac index by Tiana.      By:  Quincy Kyle MD, 12/15/2023  11:47 AM    Primary Care Physician:  Michell Lloyd             "

## 2023-12-15 NOTE — PROGRESS NOTES
Pt continue on BiPAP with avaps mode for the night.  For more info check the BiPAP flowsheet.  Rt will continue to follow.

## 2023-12-16 ENCOUNTER — APPOINTMENT (OUTPATIENT)
Dept: PHYSICAL THERAPY | Facility: HOSPITAL | Age: 53
DRG: 286 | End: 2023-12-16
Attending: INTERNAL MEDICINE
Payer: COMMERCIAL

## 2023-12-16 LAB
ANION GAP SERPL CALCULATED.3IONS-SCNC: 7 MMOL/L (ref 7–15)
BUN SERPL-MCNC: 60.7 MG/DL (ref 6–20)
CALCIUM SERPL-MCNC: 11.9 MG/DL (ref 8.6–10)
CHLORIDE SERPL-SCNC: 84 MMOL/L (ref 98–107)
CREAT SERPL-MCNC: 0.86 MG/DL (ref 0.51–0.95)
CRP SERPL-MCNC: 27 MG/L
DEPRECATED HCO3 PLAS-SCNC: 39 MMOL/L (ref 22–29)
EGFRCR SERPLBLD CKD-EPI 2021: 81 ML/MIN/1.73M2
GLUCOSE BLDC GLUCOMTR-MCNC: 138 MG/DL (ref 70–99)
GLUCOSE BLDC GLUCOMTR-MCNC: 147 MG/DL (ref 70–99)
GLUCOSE BLDC GLUCOMTR-MCNC: 218 MG/DL (ref 70–99)
GLUCOSE BLDC GLUCOMTR-MCNC: 300 MG/DL (ref 70–99)
GLUCOSE SERPL-MCNC: 125 MG/DL (ref 70–99)
MAGNESIUM SERPL-MCNC: 2.2 MG/DL (ref 1.7–2.3)
POTASSIUM SERPL-SCNC: 3.2 MMOL/L (ref 3.4–5.3)
POTASSIUM SERPL-SCNC: 3.9 MMOL/L (ref 3.4–5.3)
SODIUM SERPL-SCNC: 130 MMOL/L (ref 135–145)

## 2023-12-16 PROCEDURE — 86140 C-REACTIVE PROTEIN: CPT | Performed by: INTERNAL MEDICINE

## 2023-12-16 PROCEDURE — 94640 AIRWAY INHALATION TREATMENT: CPT | Mod: 76

## 2023-12-16 PROCEDURE — 84132 ASSAY OF SERUM POTASSIUM: CPT | Performed by: INTERNAL MEDICINE

## 2023-12-16 PROCEDURE — 99233 SBSQ HOSP IP/OBS HIGH 50: CPT | Performed by: INTERNAL MEDICINE

## 2023-12-16 PROCEDURE — 97530 THERAPEUTIC ACTIVITIES: CPT | Mod: GP

## 2023-12-16 PROCEDURE — 250N000013 HC RX MED GY IP 250 OP 250 PS 637: Performed by: INTERNAL MEDICINE

## 2023-12-16 PROCEDURE — 250N000011 HC RX IP 250 OP 636: Mod: JZ | Performed by: INTERNAL MEDICINE

## 2023-12-16 PROCEDURE — 94660 CPAP INITIATION&MGMT: CPT

## 2023-12-16 PROCEDURE — 120N000013 HC R&B IMCU

## 2023-12-16 PROCEDURE — 999N000157 HC STATISTIC RCP TIME EA 10 MIN

## 2023-12-16 PROCEDURE — 99232 SBSQ HOSP IP/OBS MODERATE 35: CPT | Performed by: INTERNAL MEDICINE

## 2023-12-16 PROCEDURE — 80048 BASIC METABOLIC PNL TOTAL CA: CPT | Performed by: INTERNAL MEDICINE

## 2023-12-16 PROCEDURE — 83735 ASSAY OF MAGNESIUM: CPT | Performed by: INTERNAL MEDICINE

## 2023-12-16 PROCEDURE — 250N000009 HC RX 250: Performed by: INTERNAL MEDICINE

## 2023-12-16 PROCEDURE — 97116 GAIT TRAINING THERAPY: CPT | Mod: GP

## 2023-12-16 PROCEDURE — 94640 AIRWAY INHALATION TREATMENT: CPT

## 2023-12-16 PROCEDURE — 99207 PR CDG-CUT & PASTE-POTENTIAL IMPACT ON LEVEL: CPT | Performed by: INTERNAL MEDICINE

## 2023-12-16 RX ORDER — DOCUSATE SODIUM 100 MG/1
100 CAPSULE, LIQUID FILLED ORAL 2 TIMES DAILY
Status: DISCONTINUED | OUTPATIENT
Start: 2023-12-16 | End: 2023-12-19 | Stop reason: HOSPADM

## 2023-12-16 RX ORDER — FUROSEMIDE 10 MG/ML
40 INJECTION INTRAMUSCULAR; INTRAVENOUS 2 TIMES DAILY
Status: DISCONTINUED | OUTPATIENT
Start: 2023-12-16 | End: 2023-12-17

## 2023-12-16 RX ORDER — POTASSIUM CHLORIDE 1500 MG/1
40 TABLET, EXTENDED RELEASE ORAL ONCE
Status: COMPLETED | OUTPATIENT
Start: 2023-12-16 | End: 2023-12-16

## 2023-12-16 RX ORDER — BISACODYL 10 MG
10 SUPPOSITORY, RECTAL RECTAL DAILY
Status: DISCONTINUED | OUTPATIENT
Start: 2023-12-16 | End: 2023-12-19 | Stop reason: HOSPADM

## 2023-12-16 RX ORDER — POLYETHYLENE GLYCOL 3350 17 G/17G
17 POWDER, FOR SOLUTION ORAL DAILY
Status: DISCONTINUED | OUTPATIENT
Start: 2023-12-16 | End: 2023-12-19 | Stop reason: HOSPADM

## 2023-12-16 RX ADMIN — POLYETHYLENE GLYCOL 3350 17 G: 17 POWDER, FOR SOLUTION ORAL at 12:27

## 2023-12-16 RX ADMIN — ALBUTEROL SULFATE 2.5 MG: 2.5 SOLUTION RESPIRATORY (INHALATION) at 20:03

## 2023-12-16 RX ADMIN — ENOXAPARIN SODIUM 40 MG: 40 INJECTION SUBCUTANEOUS at 21:06

## 2023-12-16 RX ADMIN — FUROSEMIDE 40 MG: 10 INJECTION, SOLUTION INTRAMUSCULAR; INTRAVENOUS at 16:29

## 2023-12-16 RX ADMIN — FUROSEMIDE 40 MG: 10 INJECTION, SOLUTION INTRAMUSCULAR; INTRAVENOUS at 04:00

## 2023-12-16 RX ADMIN — BUPROPION HYDROCHLORIDE 150 MG: 150 TABLET, FILM COATED, EXTENDED RELEASE ORAL at 08:48

## 2023-12-16 RX ADMIN — SPIRONOLACTONE 12.5 MG: 25 TABLET ORAL at 08:49

## 2023-12-16 RX ADMIN — ALBUTEROL SULFATE 2.5 MG: 2.5 SOLUTION RESPIRATORY (INHALATION) at 12:08

## 2023-12-16 RX ADMIN — CLOPIDOGREL BISULFATE 75 MG: 75 TABLET ORAL at 08:49

## 2023-12-16 RX ADMIN — ALBUTEROL SULFATE 2.5 MG: 2.5 SOLUTION RESPIRATORY (INHALATION) at 16:16

## 2023-12-16 RX ADMIN — BISACODYL 10 MG: 10 SUPPOSITORY RECTAL at 12:27

## 2023-12-16 RX ADMIN — Medication 81 MG: at 08:49

## 2023-12-16 RX ADMIN — ENOXAPARIN SODIUM 40 MG: 40 INJECTION SUBCUTANEOUS at 08:49

## 2023-12-16 RX ADMIN — SPIRONOLACTONE 12.5 MG: 25 TABLET ORAL at 21:06

## 2023-12-16 RX ADMIN — NICOTINE 1 PATCH: 21 PATCH, EXTENDED RELEASE TRANSDERMAL at 16:31

## 2023-12-16 RX ADMIN — ROSUVASTATIN CALCIUM 40 MG: 40 TABLET, COATED ORAL at 08:49

## 2023-12-16 RX ADMIN — ALBUTEROL SULFATE 2.5 MG: 2.5 SOLUTION RESPIRATORY (INHALATION) at 07:49

## 2023-12-16 RX ADMIN — POTASSIUM CHLORIDE 40 MEQ: 1500 TABLET, EXTENDED RELEASE ORAL at 08:49

## 2023-12-16 ASSESSMENT — ACTIVITIES OF DAILY LIVING (ADL)
ADLS_ACUITY_SCORE: 34
ADLS_ACUITY_SCORE: 36
ADLS_ACUITY_SCORE: 38
ADLS_ACUITY_SCORE: 36
ADLS_ACUITY_SCORE: 38
ADLS_ACUITY_SCORE: 38
ADLS_ACUITY_SCORE: 30
ADLS_ACUITY_SCORE: 38
ADLS_ACUITY_SCORE: 30
ADLS_ACUITY_SCORE: 36

## 2023-12-16 NOTE — PROGRESS NOTES
Imp/plan:  HF s/p diuresis - improved EF to 50-55% echo 12/12, higher BUN,drop in Na this AM, noted yesterday CVP elevated, off carvedilol given hypoxia and concern for bronchocontstriction, on iv furosemide 40mg change to bid spironolactone 12.5mg bid, diamox 500mg daily.  LVEDP on Monday to check filling pressures. Noted BUN similar to yesterday, Na slightly lower 130. Repeat BMP in AM, plan change furosemide to po tomorrow - especially if Na declines and BUN rises  CAD multivessel dz, high risk for CABG, on clopidogrel with asp, total occlusion of OM3 and diffues dz in left PL branch, critical mid RCA narrowing - would favor PCI to mid RCA Monday and repeat angiography left coronary to reassess if circ territory option for staged PCI, check LVEDP as well to guide HF therapy    Followed by Dr. Miranda in Cardiology   Current Facility-Administered Medications   Medication    acetaminophen (TYLENOL) tablet 650 mg    albuterol (PROVENTIL) neb solution 2.5 mg    aspirin EC tablet 81 mg    bisacodyl (DULCOLAX) suppository 10 mg    buPROPion (WELLBUTRIN XL) 24 hr tablet 150 mg    calcium carbonate (TUMS) chewable tablet 500 mg    carboxymethylcellulose PF (REFRESH PLUS) 0.5 % ophthalmic solution 1 drop    clopidogrel (PLAVIX) tablet 75 mg    glucose gel 15-30 g    Or    dextrose 50 % injection 25-50 mL    Or    glucagon injection 1 mg    docusate sodium (COLACE) capsule 100 mg    enoxaparin ANTICOAGULANT (LOVENOX) injection 40 mg    famotidine (PEPCID) tablet 20 mg    furosemide (LASIX) injection 40 mg    Hold: metformin and metformin containing medications on day of the procedure and for 48 hours after IV contrast given- Patients with acute kidney injury or severe chronic kidney disease (stage IV or stage V; i.e., eGFR less than 30)    hydrALAZINE (APRESOLINE) injection 5 mg    insulin aspart (NovoLOG) injection (RAPID ACTING)    lidocaine (LMX4) cream    lidocaine 1 % 0.1-1 mL    [Held by provider] losartan (COZAAR)  "tablet 25 mg    melatonin tablet 3 mg    naloxone (NARCAN) injection 0.2 mg    Or    naloxone (NARCAN) injection 0.4 mg    Or    naloxone (NARCAN) injection 0.2 mg    Or    naloxone (NARCAN) injection 0.4 mg    nicotine (NICODERM CQ) 21 MG/24HR 24 hr patch 1 patch    nicotine Patch in Place    ondansetron (ZOFRAN) injection 4 mg    polyethylene glycol (MIRALAX) Packet 17 g    potassium chloride ER (KLOR-CON M) CR tablet 40 mEq    rosuvastatin (CRESTOR) tablet 40 mg    sodium chloride (OCEAN) 0.65 % nasal spray 1 spray    sodium chloride (OCEAN) 0.65 % nasal spray 2 spray    sodium chloride (PF) 0.9% PF flush 10-40 mL    sodium chloride (PF) 0.9% PF flush 3 mL    sodium chloride (PF) 0.9% PF flush 3 mL    spironolactone (ALDACTONE) half-tab 12.5 mg     Past Medical History:   Diagnosis Date    CHF (congestive heart failure) (H)     DMII (diabetes mellitus, type 2) (H)     Essential hypertension     Morbid obesity (H)     Pulmonary hypertension (H)         Review of Systems: 12 points negative other than above    /65   Pulse 76   Temp 97.7  F (36.5  C) (Axillary)   Resp 16   Ht 1.6 m (5' 3\")   Wt 129.4 kg (285 lb 4.8 oz)   LMP  (LMP Unknown)   SpO2 90%   BMI 50.54 kg/m    JVP<7cm, carotids normal  Lungs clear  Cor RRR no c,r,m  Abs soft +BS, no mass  Ext no c,c,e    Lab Results   Component Value Date    HGB 18.0 (H) 12/15/2023     Lab Results   Component Value Date     (L) 12/15/2023     No results found for: \"CREATININE\"  No components found for: \"K\"        Rashaun Rossi MD  Interventional Cardiology   [facility]  [phone]    "

## 2023-12-16 NOTE — PROGRESS NOTES
Pt on AVAPS 18, 450, 50% +10, max/min 35-12, overnight from 23:20 until this am.  When off pt is on a 3 lpm NC, SPO2 93%.  QID Alb neb treatments given as ordered.  BS dcreased before, post increased aeration with faint insp wheezes.  RT will continue to monitor.

## 2023-12-16 NOTE — PROGRESS NOTES
BiPAP has been on standby since this morning. Oxygen titrated down to 2 lpm nasal cannula with oxygen saturation in the low 90s. No respiratory distress noted. Albuterol nebs given as scheduled without adverse effects. BBS diminished pre and post neb treatment.     Tonia Rockwell, RT

## 2023-12-16 NOTE — PROGRESS NOTES
PULMONARY / CRITICAL CARE PROGRESS NOTE    Date / Time of Admission:  12/8/2023  7:30 AM    Assessment:     Sammy Laws is a 53 year old female with history of morbid obesity, Body mass index is 51.74 kg/m ., DM2, HTN, tobacco user.   Presented on 12/8 for elective RHC and LHC. Found to have severe pulm HTN  (mPAP 52 mm Hg, CO 6.09 L/min, PVR 1.12 JOYNER), severely elevated left sided and right sided filling pressures (PCWP 45 mm Hg). Improving slowly with NIPPV, HFNC, aggressive diuresis with ~40lbs weight reduction since admission.     Acute respiratory failure   Decompensated cardiomyopathy   LLL atelectasis / infiltrates   Afebrile, normal WBC. Negative procalcitonin, elevated CRP  F/u CXR 12/14 showed improvement of LLL opacities  Follow up CBC with diff and CRP level   HTN, HFpEF, CAD  Plan for coronary angiography on 12/18.   Secondary pulmonary hypertension (II,III)  JAMES / OHS  Secondary polycythemia  Bibasal atelectasis   DM  Tobacco user  Morbid obesity Body mass index is 50.54 kg/m .    Advance Directives:  Full code    Plan:   Titrate FiO2, keep SpO2 >88%, currently on nasal cannula 3 LPM   BiPAP at night and as needed  Bronchodilators as needed  Follow CRP, CBC with diff  Continue to titrate BP meds , IV diuresis   Cardiology service is following , plan for coronary angiogram on 12/18  Monitor kidney function   Supplement electrolytes   Advance diet as tolerated  H2 blocker for GI prophylaxis   Glucose level monitoring  DVT prophylaxis lovenox subcutaneous  PT, OT evaluation     Please contact me if you have any questions.    Quincy Kyle  Pulmonary / Critical Care  12/16/2023  1:24 PM        Subjective:   HPI:  Sammy Laws is a 53 year old female with history of morbid obesity, Body mass index is 51.74 kg/m ., DM2, HTN, tobacco user.   Presented on 12/8 for elective RHC and LHC. Found to have severe pulm HTN  (mPAP 52 mm Hg, CO 6.09 L/min, PVR 1.12 JOYNER), severely elevated left sided  and right sided filling pressures (PCWP 45 mm Hg). Improving slowly with NIPPV, HFNC, aggressive diuresis since admission.     Events overnight   - On IV diuresis   - No pressors  - Used BiPAP at night  - Decrease O2 requirements 3 LPM nasal cannula  - Working with PT    Allergies: Patient has no known allergies.     MEDS:  Current Facility-Administered Medications   Medication    acetaminophen (TYLENOL) tablet 650 mg    albuterol (PROVENTIL) neb solution 2.5 mg    aspirin EC tablet 81 mg    bisacodyl (DULCOLAX) suppository 10 mg    buPROPion (WELLBUTRIN XL) 24 hr tablet 150 mg    calcium carbonate (TUMS) chewable tablet 500 mg    carboxymethylcellulose PF (REFRESH PLUS) 0.5 % ophthalmic solution 1 drop    clopidogrel (PLAVIX) tablet 75 mg    glucose gel 15-30 g    Or    dextrose 50 % injection 25-50 mL    Or    glucagon injection 1 mg    docusate sodium (COLACE) capsule 100 mg    enoxaparin ANTICOAGULANT (LOVENOX) injection 40 mg    famotidine (PEPCID) tablet 20 mg    furosemide (LASIX) injection 40 mg    Hold: metformin and metformin containing medications on day of the procedure and for 48 hours after IV contrast given- Patients with acute kidney injury or severe chronic kidney disease (stage IV or stage V; i.e., eGFR less than 30)    hydrALAZINE (APRESOLINE) injection 5 mg    insulin aspart (NovoLOG) injection (RAPID ACTING)    lidocaine (LMX4) cream    lidocaine 1 % 0.1-1 mL    [Held by provider] losartan (COZAAR) tablet 25 mg    melatonin tablet 3 mg    naloxone (NARCAN) injection 0.2 mg    Or    naloxone (NARCAN) injection 0.4 mg    Or    naloxone (NARCAN) injection 0.2 mg    Or    naloxone (NARCAN) injection 0.4 mg    nicotine (NICODERM CQ) 21 MG/24HR 24 hr patch 1 patch    nicotine Patch in Place    ondansetron (ZOFRAN) injection 4 mg    polyethylene glycol (MIRALAX) Packet 17 g    rosuvastatin (CRESTOR) tablet 40 mg    sodium chloride (OCEAN) 0.65 % nasal spray 1 spray    sodium chloride (OCEAN) 0.65 %  "nasal spray 2 spray    sodium chloride (PF) 0.9% PF flush 10-40 mL    sodium chloride (PF) 0.9% PF flush 3 mL    sodium chloride (PF) 0.9% PF flush 3 mL    spironolactone (ALDACTONE) half-tab 12.5 mg     Objective:   VITALS:  BP 91/51   Pulse 85   Temp 99.2  F (37.3  C)   Resp 18   Ht 1.6 m (5' 3\")   Wt 129.4 kg (285 lb 4.8 oz)   LMP  (LMP Unknown)   SpO2 92%   BMI 50.54 kg/m    VENT:  FiO2 (%): 45 %  Resp: 18    EXAM:   Gen: morbidly obese, awake, alert, no distress  HEENT: pink conjunctiva, moist mucosa, Mallampati III/IV  Neck: no thyromegaly, masses or JVD  Lungs: decrease breath sounds a the bases  CV: regular, distant, no murmurs or gallops appreciated  Abdomen: soft, NT, BS wnl  Ext: trace edema  Neuro: CN II-XII intact, non focal       Data Review:  Recent Labs   Lab 12/16/23  1223 12/16/23  0853 12/16/23  0549 12/15/23  1609 12/15/23  1239 12/15/23  0905   * 147* 125* 173* 164* 144*      12/16/23 05:49 12/16/23 12:26   Sodium 130 (L)    Potassium 3.2 (L) 3.9   Chloride 84 (L)    Carbon Dioxide (CO2) 39 (H)    Urea Nitrogen 60.7 (H)    Creatinine 0.86    GFR Estimate 81    Calcium 11.9 (H)    Anion Gap 7    Magnesium 2.2    Glucose 125 (H)           XR CHEST 1 VIEW  LOCATION: [facility]  DATE: 12/9/2023  INDICATION: PICC, V tach  COMPARISON: 12/08/2023  IMPRESSION: Right-sided PICC line with tip in the distal SVC. Stable cardiomegaly with unchanged mediastinal contours. Pulmonary vascular congestion with mild interstitial edema persists. Increased opacity in the left lower lobe due to atelectasis versus edema with likely a small left pleural effusion. No pneumothorax.    Echocardiogram 12/12/2023  Left ventricular function is decreased. The ejection fraction is 50-55%  (borderline).  The right ventricle is not well visualized.  The right ventricle is mildly dilated.  Mildly decreased right ventricular systolic function  Small pericardial effusion  There are no " "echocardiographic indications of cardiac tamponade.  Technically difficult, suboptimal study.    Coronary angiogram 12/8/2023  Multivessel CAD as described below:  LM: Normal  LAD: Diffuse mild disease with a focal moderate lesion in the distal LAD.  DFR is positive (0.84) beyond the distal LAD lesion, but DFR pullback shows gradual change in DFR, with no focal \"step-up\" to suggest a benefit of PCI.  LCx: Codominant vessel with severe stenosis downstream in the L PDA.  This is a relatively small vessel at this point (roughly 2 mm in diameter).  RCA: Codominant vessel with chronic subtotal occlusion in the mid vessel, with ALVINA I flow distally.  Severe WHO II (postcapillary) pulmonary hypertension.  Severely elevated pulmonary capillary wedge pressure (~45 mmHg).  Normal cardiac index by Tiana.      By:  Quincy Kyle MD, 12/16/2023  1:24 PM    Primary Care Physician:  Mcihell Lloyd             "

## 2023-12-16 NOTE — PROGRESS NOTES
[facility]    PROGRESS NOTE - Hospitalist Service    Assessment and Plan  53 year old female with morbid obesity, cad, dm came in for elective cardiac angiogram. During angiogram, was found to have severely elevated pulmonary wedge pressures, decompensated CHF.  Admitted to the ICU on BiPAP, ultimately required Bumex drip for diuresis.  downgraded from ICU 12/10. Ongoing cardiology workup and medication titration. Ideally would not discharge until home AVAPS arranged, still hypoxic and requires 50 L        Acute on chronic hypercapnic and hypoxic resp failure  -ABG on admit showed severe hypercarbia and acidosis- started on BiPAP in ICU but acidosis remained significant, ongoing adjustment of BiPAP settings by intensivist, requiring very high settings with AVAPS.  After diuresis, now able to tolerate high flow nasal cannula during the day, she will need AVAPS at nighttime. Overnight oximetry study completed last night.  - high risk for not only JAMES (never had sleep study), but also hypoventilation syndrome, as well as has severe pulm htn noted on angio.  This is presumably chronic, patient had not been seeing a doctor in some time  - Pulm working on arrangements for home AVAPS  - Scheduled albuterol nebs  - was severely hypoxic and required 50 L.  -Significant improvement today on the oxygen requirement is down to 3-4 L  -Transferred out of ICU on 12/16/2023     Acute diastolic HF  -UOP was poor with bolus dose diuretics, changed to Bumex drip and given Diuril IV with immediate improvement.  UOP worse again on bolus dose Bumex, Bumex drip resumed last night. >40 lbs down from admission. Now cardiology changing her to PO diuretic plus metolazone and spironolactone. Will want to monitor closely to ensure adequate diuresis on this regimen.  -cardiology following  -Murphy out, voiding well  -Trend BMP  -cardiology considering RHC  -Diuresis is resumed-Blood pressure is on the low side and patient    -  Levophed drip is ordered by pulmonology but never was started as blood pressure is stable  -Continue IV diuresis  -Plan for coronary angiogram on Monday     Acute encephalopathy  -Metabolic secondary to hypercarbia  -Resolved with  -monitor as indicator of adequacy of NIPPV     Coronary artery disease  - s/p cath, no stents placed due to poor hemodynamics at time of cath and need for urgent diuresis. Could benefit from stenting of mid RCA and L PDA, cardiology planning to discuss with CV surgery before considering another PCI attempt. Has multiple other areas of disease not amenable to stenting.  - ASA, statin, BB  - CV surgery consult, plan for medical treatment at this point  -Plan for coronary angiogram on Monday     Diabetes mellitus type 2  -Cover with sliding scale  - recent A1c 7.2  - hold po metformin for now  - cardiology considering Jardiance or Ozempic, no objection to either from my standpoint     Morbid obesity  -BMI 51  -recommend consider outpatient Bariatrics eval, this is a life limiting diagnosis at this point     Hypertension  -home losartan     Depression  -home Wellbutrin     Smoking dependence  -Patch  -Requested help quitting     Nonsustained V. Tach  -Attributed to PICC line, resolved after PICC line was pulled back 2 cm  -Continue to monitor on telemetry for now     Polycythemia  -Secondary to chronic hypoxia  -Stable hemoglobin around 18      35 MINUTES SPENT BY ME on the date of service doing chart review, history, exam, documentation & further activities per the note    Principal Problem:    CHF (congestive heart failure) (H)  Active Problems:    Diabetes mellitus, type 2 (H)    Hypertension, unspecified type    Atherosclerosis of aorta (H24)    Nonspecific abnormal electrocardiogram (ECG) (EKG)    Morbid obesity (H)    SOB (shortness of breath)    Pulmonary hypertension (H)    Nicotine dependence      VTE prophylaxis:  Enoxaparin (Lovenox) SQ  DIET: Orders Placed This Encounter       Diet      Combination Diet Regular Diet; Moderate Consistent Carb (60 g CHO per Meal) Diet; 2 gm NA Diet      Disposition/Barriers to discharge: IV diuresis, coronary angiogram on Monday  Code Status: Full Code    Subjective:  Measha is feeling better today, shortness of breath continue to improve.  Denies chest pain.    PHYSICAL EXAM  Vitals:    12/14/23 0600 12/15/23 0400 12/16/23 0600   Weight: 130.2 kg (287 lb 0.6 oz) 128.1 kg (282 lb 6.6 oz) 129.4 kg (285 lb 4.8 oz)     B/P:91/51 T:99.2 P:85 R:18     Intake/Output Summary (Last 24 hours) at 12/16/2023 1449  Last data filed at 12/16/2023 1417  Gross per 24 hour   Intake 1100 ml   Output 1800 ml   Net -700 ml      Body mass index is 50.54 kg/m .    Constitutional: awake, alert, cooperative, no apparent distress, and appears stated age  Respiratory: Decreased breath sounds at lung bases with scattered rhonchi.  No rales.  Cardiovascular: Normal apical impulse, regular rate and rhythm, normal S1 and S2, no S3 or S4, and no murmur noted  GI: No scars, normal bowel sounds, soft, non-distended, non-tender, no masses palpated, no hepatosplenomegally  Skin: no bruising or bleeding and normal skin color, texture, turgor  Musculoskeletal: There is no redness, warmth, or swelling of the joints.  Full range of motion noted.  Bilateral +1 lower extremity pitting edema present  Neurologic: Awake, alert, oriented to name, place and time.  Cranial nerves II-XII are grossly intact.  Motor is 5 out of 5 bilaterally.   Sensory is intact.    Neuropsychiatric: Appropriate with examiner      PERTINENT LABS/IMAGING:    I have personally reviewed the following data over the past 24 hrs:    N/A  \   N/A   / N/A     130 (L) 84 (L) 60.7 (H) /  218 (H)   3.9 39 (H) 0.86 \       Imaging results reviewed over the past 24 hrs:   No results found for this or any previous visit (from the past 24 hour(s)).    Discussed with patient, family, pulmonology, nursing staff and discharge  ramesh Greenberg MD  [facility]  [phone]

## 2023-12-17 ENCOUNTER — APPOINTMENT (OUTPATIENT)
Dept: OCCUPATIONAL THERAPY | Facility: HOSPITAL | Age: 53
DRG: 286 | End: 2023-12-17
Attending: INTERNAL MEDICINE
Payer: COMMERCIAL

## 2023-12-17 ENCOUNTER — DOCUMENTATION ONLY (OUTPATIENT)
Facility: CLINIC | Age: 53
End: 2023-12-17
Payer: COMMERCIAL

## 2023-12-17 LAB
ANION GAP SERPL CALCULATED.3IONS-SCNC: 5 MMOL/L (ref 7–15)
BASE EXCESS BLDA CALC-SCNC: 10.4 MMOL/L
BASOPHILS # BLD AUTO: 0.1 10E3/UL (ref 0–0.2)
BASOPHILS NFR BLD AUTO: 1 %
BUN SERPL-MCNC: 55.5 MG/DL (ref 6–20)
CALCIUM SERPL-MCNC: 11.7 MG/DL (ref 8.6–10)
CHLORIDE SERPL-SCNC: 87 MMOL/L (ref 98–107)
COHGB MFR BLD: 94.7 % (ref 96–97)
CREAT SERPL-MCNC: 0.79 MG/DL (ref 0.51–0.95)
DEPRECATED HCO3 PLAS-SCNC: 40 MMOL/L (ref 22–29)
EGFRCR SERPLBLD CKD-EPI 2021: 90 ML/MIN/1.73M2
EOSINOPHIL # BLD AUTO: 0.2 10E3/UL (ref 0–0.7)
EOSINOPHIL NFR BLD AUTO: 2 %
ERYTHROCYTE [DISTWIDTH] IN BLOOD BY AUTOMATED COUNT: 20.7 % (ref 10–15)
GLUCOSE BLDC GLUCOMTR-MCNC: 119 MG/DL (ref 70–99)
GLUCOSE BLDC GLUCOMTR-MCNC: 132 MG/DL (ref 70–99)
GLUCOSE BLDC GLUCOMTR-MCNC: 142 MG/DL (ref 70–99)
GLUCOSE BLDC GLUCOMTR-MCNC: 156 MG/DL (ref 70–99)
GLUCOSE SERPL-MCNC: 121 MG/DL (ref 70–99)
HCO3 BLD-SCNC: 35 MMOL/L (ref 23–29)
HCT VFR BLD AUTO: 60.7 % (ref 35–47)
HGB BLD-MCNC: 17.5 G/DL (ref 11.7–15.7)
IMM GRANULOCYTES # BLD: 0 10E3/UL
IMM GRANULOCYTES NFR BLD: 0 %
LYMPHOCYTES # BLD AUTO: 2.8 10E3/UL (ref 0.8–5.3)
LYMPHOCYTES NFR BLD AUTO: 30 %
MAGNESIUM SERPL-MCNC: 2.2 MG/DL (ref 1.7–2.3)
MCH RBC QN AUTO: 24 PG (ref 26.5–33)
MCHC RBC AUTO-ENTMCNC: 28.8 G/DL (ref 31.5–36.5)
MCV RBC AUTO: 83 FL (ref 78–100)
MONOCYTES # BLD AUTO: 1 10E3/UL (ref 0–1.3)
MONOCYTES NFR BLD AUTO: 11 %
NEUTROPHILS # BLD AUTO: 5.1 10E3/UL (ref 1.6–8.3)
NEUTROPHILS NFR BLD AUTO: 56 %
NRBC # BLD AUTO: 0 10E3/UL
NRBC BLD AUTO-RTO: 0 /100
OXYHGB MFR BLD: 93.2 % (ref 96–97)
PCO2 BLD: 54 MM HG (ref 35–45)
PH BLD: 7.42 [PH] (ref 7.37–7.44)
PLATELET # BLD AUTO: 150 10E3/UL (ref 150–450)
PO2 BLD: 71 MM HG (ref 80–90)
POTASSIUM SERPL-SCNC: 3.2 MMOL/L (ref 3.4–5.3)
POTASSIUM SERPL-SCNC: 4.1 MMOL/L (ref 3.4–5.3)
RBC # BLD AUTO: 7.3 10E6/UL (ref 3.8–5.2)
SODIUM SERPL-SCNC: 132 MMOL/L (ref 135–145)
TEMPERATURE: 37 DEGREES C
WBC # BLD AUTO: 9.6 10E3/UL (ref 4–11)

## 2023-12-17 PROCEDURE — 120N000013 HC R&B IMCU

## 2023-12-17 PROCEDURE — 99231 SBSQ HOSP IP/OBS SF/LOW 25: CPT | Performed by: INTERNAL MEDICINE

## 2023-12-17 PROCEDURE — 83735 ASSAY OF MAGNESIUM: CPT | Performed by: INTERNAL MEDICINE

## 2023-12-17 PROCEDURE — 80048 BASIC METABOLIC PNL TOTAL CA: CPT | Performed by: INTERNAL MEDICINE

## 2023-12-17 PROCEDURE — 250N000011 HC RX IP 250 OP 636: Mod: JZ | Performed by: INTERNAL MEDICINE

## 2023-12-17 PROCEDURE — 85025 COMPLETE CBC W/AUTO DIFF WBC: CPT | Performed by: INTERNAL MEDICINE

## 2023-12-17 PROCEDURE — 250N000013 HC RX MED GY IP 250 OP 250 PS 637: Performed by: INTERNAL MEDICINE

## 2023-12-17 PROCEDURE — 84132 ASSAY OF SERUM POTASSIUM: CPT | Performed by: INTERNAL MEDICINE

## 2023-12-17 PROCEDURE — 999N000157 HC STATISTIC RCP TIME EA 10 MIN

## 2023-12-17 PROCEDURE — 82805 BLOOD GASES W/O2 SATURATION: CPT | Performed by: INTERNAL MEDICINE

## 2023-12-17 PROCEDURE — 99233 SBSQ HOSP IP/OBS HIGH 50: CPT | Performed by: INTERNAL MEDICINE

## 2023-12-17 PROCEDURE — 97110 THERAPEUTIC EXERCISES: CPT | Mod: GO

## 2023-12-17 PROCEDURE — 94660 CPAP INITIATION&MGMT: CPT

## 2023-12-17 PROCEDURE — 99207 PR CDG-CUT & PASTE-POTENTIAL IMPACT ON LEVEL: CPT | Performed by: INTERNAL MEDICINE

## 2023-12-17 PROCEDURE — 97535 SELF CARE MNGMENT TRAINING: CPT | Mod: GO

## 2023-12-17 RX ORDER — FUROSEMIDE 40 MG
40 TABLET ORAL
Status: DISCONTINUED | OUTPATIENT
Start: 2023-12-17 | End: 2023-12-19 | Stop reason: HOSPADM

## 2023-12-17 RX ORDER — POTASSIUM CHLORIDE 1500 MG/1
40 TABLET, EXTENDED RELEASE ORAL ONCE
Status: COMPLETED | OUTPATIENT
Start: 2023-12-17 | End: 2023-12-17

## 2023-12-17 RX ORDER — ALBUTEROL SULFATE 0.83 MG/ML
2.5 SOLUTION RESPIRATORY (INHALATION) EVERY 4 HOURS PRN
Status: DISCONTINUED | OUTPATIENT
Start: 2023-12-17 | End: 2023-12-19 | Stop reason: HOSPADM

## 2023-12-17 RX ADMIN — DOCUSATE SODIUM 100 MG: 100 CAPSULE, LIQUID FILLED ORAL at 09:18

## 2023-12-17 RX ADMIN — SPIRONOLACTONE 12.5 MG: 25 TABLET ORAL at 22:13

## 2023-12-17 RX ADMIN — SPIRONOLACTONE 12.5 MG: 25 TABLET ORAL at 09:18

## 2023-12-17 RX ADMIN — FAMOTIDINE 20 MG: 20 TABLET ORAL at 22:11

## 2023-12-17 RX ADMIN — DOCUSATE SODIUM 100 MG: 100 CAPSULE, LIQUID FILLED ORAL at 22:13

## 2023-12-17 RX ADMIN — ENOXAPARIN SODIUM 40 MG: 40 INJECTION SUBCUTANEOUS at 09:18

## 2023-12-17 RX ADMIN — Medication 81 MG: at 09:18

## 2023-12-17 RX ADMIN — NICOTINE 1 PATCH: 21 PATCH, EXTENDED RELEASE TRANSDERMAL at 17:48

## 2023-12-17 RX ADMIN — ENOXAPARIN SODIUM 40 MG: 40 INJECTION SUBCUTANEOUS at 22:16

## 2023-12-17 RX ADMIN — BUPROPION HYDROCHLORIDE 150 MG: 150 TABLET, FILM COATED, EXTENDED RELEASE ORAL at 09:18

## 2023-12-17 RX ADMIN — FUROSEMIDE 40 MG: 40 TABLET ORAL at 13:38

## 2023-12-17 RX ADMIN — POTASSIUM CHLORIDE 40 MEQ: 1500 TABLET, EXTENDED RELEASE ORAL at 09:22

## 2023-12-17 RX ADMIN — FUROSEMIDE 40 MG: 40 TABLET ORAL at 17:45

## 2023-12-17 RX ADMIN — ACETAMINOPHEN 650 MG: 325 TABLET ORAL at 22:11

## 2023-12-17 RX ADMIN — FUROSEMIDE 40 MG: 10 INJECTION, SOLUTION INTRAMUSCULAR; INTRAVENOUS at 09:19

## 2023-12-17 RX ADMIN — CLOPIDOGREL BISULFATE 75 MG: 75 TABLET ORAL at 09:18

## 2023-12-17 RX ADMIN — POLYETHYLENE GLYCOL 3350 17 G: 17 POWDER, FOR SOLUTION ORAL at 09:22

## 2023-12-17 RX ADMIN — ROSUVASTATIN CALCIUM 40 MG: 40 TABLET, COATED ORAL at 09:18

## 2023-12-17 ASSESSMENT — ACTIVITIES OF DAILY LIVING (ADL)
DEPENDENT_IADLS:: INDEPENDENT
ADLS_ACUITY_SCORE: 30

## 2023-12-17 NOTE — PROGRESS NOTES
[facility]    PROGRESS NOTE - Hospitalist Service    Assessment and Plan  53 year old female with morbid obesity, cad, dm came in for elective cardiac angiogram. During angiogram, was found to have severely elevated pulmonary wedge pressures, decompensated CHF.  Admitted to the ICU on BiPAP, ultimately required Bumex drip for diuresis.  downgraded from ICU 12/10. Ongoing cardiology workup and medication titration. Ideally would not discharge until home AVAPS arranged, still hypoxic and requires 50 L        Acute on chronic hypercapnic and hypoxic resp failure  -ABG on admit showed severe hypercarbia and acidosis- started on BiPAP in ICU but acidosis remained significant, ongoing adjustment of BiPAP settings by intensivist, requiring very high settings with AVAPS.  After diuresis, now able to tolerate high flow nasal cannula during the day, she will need AVAPS at nighttime. Overnight oximetry study completed last night.  - high risk for not only JAMES (never had sleep study), but also hypoventilation syndrome, as well as has severe pulm htn noted on angio.  This is presumably chronic, patient had not been seeing a doctor in some time  - Pulm working on arrangements for home AVAPS  - Scheduled albuterol nebs  - was severely hypoxic and required 50 L.  -Significant improvement today on the oxygen requirement is down to 3-4 L  -Transferred out of ICU on 12/16/2023  - Plan to discharge home with NIPPV as per pulmonology     Acute diastolic HF  -UOP was poor with bolus dose diuretics, changed to Bumex drip and given Diuril IV with immediate improvement.  UOP worse again on bolus dose Bumex, Bumex drip resumed last night. >40 lbs down from admission. Now cardiology changing her to PO diuretic plus metolazone and spironolactone. Will want to monitor closely to ensure adequate diuresis on this regimen.  -cardiology following  -Murphy out, voiding well  -Trend BMP  -cardiology considering RHC  -Diuresis  is resumed-Blood pressure is on the low side and patient   -  Levophed drip is ordered by pulmonology but never was started as blood pressure is stable  -Significant improvement with IV diuresis, patient lost about 40 pounds  - Transition to oral Lasix today  -Plan for coronary angiogram on Monday    Acute encephalopathy  -Metabolic secondary to hypercarbia  -Resolved with  -monitor as indicator of adequacy of NIPPV     Coronary artery disease  - s/p cath, no stents placed due to poor hemodynamics at time of cath and need for urgent diuresis. Could benefit from stenting of mid RCA and L PDA, cardiology planning to discuss with CV surgery before considering another PCI attempt. Has multiple other areas of disease not amenable to stenting.  - ASA, statin, BB  - CV surgery consult, plan for medical treatment at this point  - Plan for coronary angiogram on Monday     Diabetes mellitus type 2  -Cover with sliding scale  - recent A1c 7.2  - hold po metformin for now  - cardiology considering Jardiance or Ozempic, no objection to either from my standpoint     Morbid obesity  - BMI 51  - consider outpatient Bariatrics eval, this is a life limiting diagnosis at this point     Hypertension  -home losartan     Depression  -home Wellbutrin     Smoking dependence  -Patch  -Requested help quitting     Nonsustained V. Tach  - Attributed to PICC line, resolved after PICC line was pulled back 2 cm  - Continue to monitor on telemetry for now     Polycythemia  -Secondary to chronic hypoxia  -Stable hemoglobin around 18        35 MINUTES SPENT BY ME on the date of service doing chart review, history, exam, documentation & further activities per the note    Principal Problem:    CHF (congestive heart failure) (H)  Active Problems:    Diabetes mellitus, type 2 (H)    Hypertension, unspecified type    Atherosclerosis of aorta (H24)    Nonspecific abnormal electrocardiogram (ECG) (EKG)    Morbid obesity (H)    SOB (shortness of breath)     Pulmonary hypertension (H)    Nicotine dependence      VTE prophylaxis:  Enoxaparin (Lovenox) SQ  DIET: Orders Placed This Encounter      Diet      Combination Diet Regular Diet; Moderate Consistent Carb (60 g CHO per Meal) Diet; 2 gm NA Diet      Disposition/Barriers to discharge: Coronary angiogram, hypoxia  Code Status: Full Code    Subjective:  Measha continues to improve, oxygen requirement is getting less, continue to lose weight.  Denies chest pain.    PHYSICAL EXAM  Vitals:    12/15/23 0400 12/16/23 0600 12/17/23 0600   Weight: 128.1 kg (282 lb 6.6 oz) 129.4 kg (285 lb 4.8 oz) 127.1 kg (280 lb 1.6 oz)     B/P:102/58 T:98.4 P:81 R:18     Intake/Output Summary (Last 24 hours) at 12/17/2023 1420  Last data filed at 12/17/2023 1336  Gross per 24 hour   Intake 945 ml   Output 2200 ml   Net -1255 ml      Body mass index is 49.62 kg/m .    Constitutional: awake, alert, cooperative, no apparent distress, and appears stated age  Respiratory: No increased work of breathing, good air exchange, clear to auscultation bilaterally, no crackles or wheezing  Cardiovascular: Normal apical impulse, regular rate and rhythm, normal S1 and S2, no S3 or S4, and no murmur noted  GI: No scars, normal bowel sounds, soft, non-distended, non-tender, no masses palpated, no hepatosplenomegally  Skin: no bruising or bleeding and normal skin color, texture, turgor  Musculoskeletal: There is no redness, warmth, or swelling of the joints.  Full range of motion noted.  +1 bilateral lower extremity pitting edema present  Neurologic: Awake, alert, oriented to name, place and time.  Cranial nerves II-XII are grossly intact.  Motor is 5 out of 5 bilaterally.   Sensory is intact.    Neuropsychiatric: Appropriate with examiner      PERTINENT LABS/IMAGING:    I have personally reviewed the following data over the past 24 hrs:    9.6  \   17.5 (H)   / 150     132 (L) 87 (L) 55.5 (H) /  156 (H)   3.2 (L) 40 (H) 0.79 \     Procal: N/A CRP: N/A Lactic  Acid: N/A         Imaging results reviewed over the past 24 hrs:   No results found for this or any previous visit (from the past 24 hour(s)).    Discussed with patient, family, pulmonology, nursing staff and discharge planner    Josh Greenberg MD  [facility]  [phone]

## 2023-12-17 NOTE — PROGRESS NOTES
RESPIRATORY CARE NOTE    Patient placed on V 60 for night at 0015:  BiPAP  Mode:  AVAPS  IPAP:  min 12, max 35  EPAP:  10  Resp. Rate:  18    FiO2:  45%       Pt tolerating well.     Hakeem Anne, RT

## 2023-12-17 NOTE — PROGRESS NOTES
Imp/plan:  CAD multivessel dz, high risk for CABG, on clopidogrel with asp/rosuvastatin, total occlusion of OM3 and diffues dz in left PL branch, critical mid RCA narrowing - plan PCI to mid RCA Monday and repeat angiography left coronary to reassess if circ territory option for staged PCI, check LVEDP on Monday guide HF therapy  HF s/p diuresis - improved EF to 50-55% echo 12/12, BUN, Na improved this AM, off carvedilol given hypoxia and concern for bronchocontstriction, on iv furosemide 40mg change to bid spironolactone 12.5mg bid, diamox 500mg daily.  LVEDP on Monday to check filling pressures.  plan change furosemide to po today -   Noted higher hgb/CO2 c/w COPD complicating her dyspnea.     Followed by Dr. Miranda in Cardiology     Current Facility-Administered Medications   Medication    acetaminophen (TYLENOL) tablet 650 mg    albuterol (PROVENTIL) neb solution 2.5 mg    aspirin EC tablet 81 mg    bisacodyl (DULCOLAX) suppository 10 mg    buPROPion (WELLBUTRIN XL) 24 hr tablet 150 mg    calcium carbonate (TUMS) chewable tablet 500 mg    carboxymethylcellulose PF (REFRESH PLUS) 0.5 % ophthalmic solution 1 drop    clopidogrel (PLAVIX) tablet 75 mg    glucose gel 15-30 g    Or    dextrose 50 % injection 25-50 mL    Or    glucagon injection 1 mg    docusate sodium (COLACE) capsule 100 mg    enoxaparin ANTICOAGULANT (LOVENOX) injection 40 mg    famotidine (PEPCID) tablet 20 mg    furosemide (LASIX) injection 40 mg    Hold: metformin and metformin containing medications on day of the procedure and for 48 hours after IV contrast given- Patients with acute kidney injury or severe chronic kidney disease (stage IV or stage V; i.e., eGFR less than 30)    hydrALAZINE (APRESOLINE) injection 5 mg    insulin aspart (NovoLOG) injection (RAPID ACTING)    insulin aspart (NovoLOG) injection (RAPID ACTING)    lidocaine (LMX4) cream    lidocaine 1 % 0.1-1 mL    [Held by provider] losartan (COZAAR) tablet 25 mg    melatonin tablet  "3 mg    naloxone (NARCAN) injection 0.2 mg    Or    naloxone (NARCAN) injection 0.4 mg    Or    naloxone (NARCAN) injection 0.2 mg    Or    naloxone (NARCAN) injection 0.4 mg    nicotine (NICODERM CQ) 21 MG/24HR 24 hr patch 1 patch    nicotine Patch in Place    ondansetron (ZOFRAN) injection 4 mg    polyethylene glycol (MIRALAX) Packet 17 g    rosuvastatin (CRESTOR) tablet 40 mg    sodium chloride (OCEAN) 0.65 % nasal spray 1 spray    sodium chloride (OCEAN) 0.65 % nasal spray 2 spray    sodium chloride (PF) 0.9% PF flush 10-40 mL    sodium chloride (PF) 0.9% PF flush 3 mL    sodium chloride (PF) 0.9% PF flush 3 mL    spironolactone (ALDACTONE) half-tab 12.5 mg     Past Medical History:   Diagnosis Date    CHF (congestive heart failure) (H)     DMII (diabetes mellitus, type 2) (H)     Essential hypertension     Morbid obesity (H)     Pulmonary hypertension (H)         Review of Systems: 12 points negative other than above    /71 (BP Location: Left arm)   Pulse 64   Temp 97.5  F (36.4  C) (Axillary)   Resp 20   Ht 1.6 m (5' 3\")   Wt 127.1 kg (280 lb 1.6 oz)   LMP  (LMP Unknown)   SpO2 93%   BMI 49.62 kg/m    JVP<7cm, carotids normal  Lungs clear  Cor RRR no c,r,m  Abs soft +BS, no mass  Ext no c,c,e    Lab Results   Component Value Date    HGB 17.5 (H) 12/17/2023     Lab Results   Component Value Date     12/17/2023     No results found for: \"CREATININE\"  No components found for: \"K\"          Rashaun Rossi MD  Interventional Cardiology   [facility]  [phone]    "

## 2023-12-17 NOTE — CONSULTS
Care Management Initial Consult    General Information  Assessment completed with:  ,    Type of CM/SW Visit: Initial Assessment    Primary Care Provider verified and updated as needed: Yes   Readmission within the last 30 days: no previous admission in last 30 days         Advance Care Planning: Advance Care Planning Reviewed:  (no HCD)          Communication Assessment  Patient's communication style: spoken language (English or Bilingual)    Hearing Difficulty or Deaf: no   Wear Glasses or Blind: no    Cognitive  Cognitive/Neuro/Behavioral: WDL  Level of Consciousness: alert  Arousal Level: opens eyes spontaneously  Orientation: oriented x 4  Mood/Behavior: calm, cooperative  Best Language: 0 - No aphasia  Speech: clear    Living Environment:   People in home: significant other     Current living Arrangements: house      Able to return to prior arrangements: yes       Family/Social Support:  Care provided by: self  Provides care for: no one  Marital Status: Lives with Significant Other  Significant Other          Description of Support System: Supportive, Involved         Current Resources:   Patient receiving home care services: No     Community Resources: None  Equipment currently used at home: none  Supplies currently used at home: None    Employment/Financial:  Employment Status: employed full-time         Does the patient's insurance plan have a 3 day qualifying hospital stay waiver?  No    Lifestyle & Psychosocial Needs:  Social Determinants of Health     Food Insecurity: Low Risk  (11/1/2023)    Food Insecurity     Within the past 12 months, did you worry that your food would run out before you got money to buy more?: No     Within the past 12 months, did the food you bought just not last and you didn t have money to get more?: No   Depression: Not at risk (11/1/2023)    PHQ-2     PHQ-2 Score: 2   Housing Stability: Low Risk  (11/1/2023)    Housing Stability     Do you have housing? : Yes     Are you  worried about losing your housing?: No   Tobacco Use: High Risk (12/11/2023)    Patient History     Smoking Tobacco Use: Every Day     Smokeless Tobacco Use: Never     Passive Exposure: Current   Financial Resource Strain: Low Risk  (11/1/2023)    Financial Resource Strain     Within the past 12 months, have you or your family members you live with been unable to get utilities (heat, electricity) when it was really needed?: No   Alcohol Use: Not on file   Transportation Needs: Low Risk  (11/1/2023)    Transportation Needs     Within the past 12 months, has lack of transportation kept you from medical appointments, getting your medicines, non-medical meetings or appointments, work, or from getting things that you need?: No   Physical Activity: Not on file   Interpersonal Safety: Not on file   Stress: Not on file   Social Connections: Not on file       Functional Status:  Prior to admission patient needed assistance:   Dependent ADLs:: Independent  Dependent IADLs:: Independent           Additional Information:    Assessment completed with patient. Patient reports she shares a house with significant other. She is independent with ADLs and IADLs, ambulates without devices, works and drives. SO is primary family contact and will transport at discharge.    Patient will need bipap at discharge per recommendations.  Pulmonology to assist with obtaining AVAPS.       Sara Figueroa RN

## 2023-12-17 NOTE — PROGRESS NOTES
PULMONARY / CRITICAL CARE PROGRESS NOTE    Date / Time of Admission:  12/8/2023  7:30 AM    Assessment:     Sammy Laws is a 53 year old female with history of morbid obesity, Body mass index is 51.74 kg/m ., DM2, HTN, tobacco user.   Presented on 12/8 for elective RHC and LHC. Found to have severe pulm HTN  (mPAP 52 mm Hg, CO 6.09 L/min, PVR 1.12 JOYNER), severely elevated left sided and right sided filling pressures (PCWP 45 mm Hg). Improving slowly with NIPPV, HFNC, aggressive diuresis with ~40lbs weight reduction since admission.     Acute respiratory failure   Decompensated cardiomyopathy   Continue to titrate BP meds and diuresis.   Is/Os Negative 18 L since admission ,  weight is down in over 35 lbs.    Cardiology is planing coronary angiogram 12/18  LLL atelectasis / infiltrates   Afebrile, normal WBC. Negative procalcitonin, elevated CRP  F/u CXR 12/14 showed improvement of LLL opacities  Follow up CBC showed normal WBC and diff, CRP level is trending down.   HTN, HFpEF, CAD  Plan for coronary angiography on 12/18.   Secondary pulmonary hypertension (II,III)  JAMES / OHS  Secondary polycythemia  Bibasal atelectasis   DM  Tobacco user  Morbid obesity Body mass index is 49.62 kg/m .    Advance Directives:  Full code    Plan:   Titrate FiO2, keep SpO2 >88%, currently on nasal cannula 3 LPM   Continue BiPAP at night and as needed  Bronchodilators as needed  Continue to titrate BP meds , IV diuresis was transition to PO  Cardiology service is following , plan for coronary angiogram on 12/18  Monitor kidney function   Supplement electrolytes   Advance diet as tolerated  H2 blocker for GI prophylaxis   Glucose level monitoring  DVT prophylaxis lovenox subcutaneous  PT, OT evaluation   Plan to discharge home with NIPPV.     Please contact me if you have any questions.    Quincy Kyle  Pulmonary / Critical Care  12/17/2023  10:03 AM        Subjective:   HPI:  Sammy Laws is a 53 year old female with  history of morbid obesity, Body mass index is 51.74 kg/m ., DM2, HTN, tobacco user.   Presented on 12/8 for elective RHC and LHC. Found to have severe pulm HTN  (mPAP 52 mm Hg, CO 6.09 L/min, PVR 1.12 JOYNER), severely elevated left sided and right sided filling pressures (PCWP 45 mm Hg). Improving slowly with NIPPV, HFNC, aggressive diuresis since admission.     Events overnight   - No pressors  - Used BiPAP at night  - Decrease O2 requirements 3 LPM nasal cannula  - Working with PT    Allergies: Patient has no known allergies.     MEDS:  Current Facility-Administered Medications   Medication    acetaminophen (TYLENOL) tablet 650 mg    albuterol (PROVENTIL) neb solution 2.5 mg    aspirin EC tablet 81 mg    bisacodyl (DULCOLAX) suppository 10 mg    buPROPion (WELLBUTRIN XL) 24 hr tablet 150 mg    calcium carbonate (TUMS) chewable tablet 500 mg    carboxymethylcellulose PF (REFRESH PLUS) 0.5 % ophthalmic solution 1 drop    clopidogrel (PLAVIX) tablet 75 mg    glucose gel 15-30 g    Or    dextrose 50 % injection 25-50 mL    Or    glucagon injection 1 mg    docusate sodium (COLACE) capsule 100 mg    enoxaparin ANTICOAGULANT (LOVENOX) injection 40 mg    famotidine (PEPCID) tablet 20 mg    furosemide (LASIX) injection 40 mg    Hold: metformin and metformin containing medications on day of the procedure and for 48 hours after IV contrast given- Patients with acute kidney injury or severe chronic kidney disease (stage IV or stage V; i.e., eGFR less than 30)    hydrALAZINE (APRESOLINE) injection 5 mg    insulin aspart (NovoLOG) injection (RAPID ACTING)    insulin aspart (NovoLOG) injection (RAPID ACTING)    lidocaine (LMX4) cream    lidocaine 1 % 0.1-1 mL    [Held by provider] losartan (COZAAR) tablet 25 mg    melatonin tablet 3 mg    naloxone (NARCAN) injection 0.2 mg    Or    naloxone (NARCAN) injection 0.4 mg    Or    naloxone (NARCAN) injection 0.2 mg    Or    naloxone (NARCAN) injection 0.4 mg    nicotine (NICODERM CQ) 21  "MG/24HR 24 hr patch 1 patch    nicotine Patch in Place    ondansetron (ZOFRAN) injection 4 mg    polyethylene glycol (MIRALAX) Packet 17 g    rosuvastatin (CRESTOR) tablet 40 mg    sodium chloride (OCEAN) 0.65 % nasal spray 1 spray    sodium chloride (OCEAN) 0.65 % nasal spray 2 spray    sodium chloride (PF) 0.9% PF flush 10-40 mL    sodium chloride (PF) 0.9% PF flush 3 mL    sodium chloride (PF) 0.9% PF flush 3 mL    spironolactone (ALDACTONE) half-tab 12.5 mg     Objective:   VITALS:  /71 (BP Location: Left arm)   Pulse 64   Temp 97.5  F (36.4  C) (Axillary)   Resp 20   Ht 1.6 m (5' 3\")   Wt 127.1 kg (280 lb 1.6 oz)   LMP  (LMP Unknown)   SpO2 93%   BMI 49.62 kg/m    VENT:  FiO2 (%): 35 %  Resp: 20    EXAM:   Gen: morbidly obese, awake, alert, no distress  HEENT: pink conjunctiva, moist mucosa, Mallampati III/IV  Neck: no thyromegaly, masses or JVD  Lungs: decrease breath sounds a the bases  CV: regular, distant, no murmurs or gallops appreciated  Abdomen: soft, NT, BS wnl  Ext: trace edema  Neuro: CN II-XII intact, non focal       Data Review:  Recent Labs   Lab 12/17/23  0753 12/17/23  0413 12/16/23  2029 12/16/23  1605 12/16/23  1223 12/16/23  0853   * 121* 300* 138* 218* 147*      12/17/23 04:13   Sodium 132 (L)   Potassium 3.2 (L)   Chloride 87 (L)   Carbon Dioxide (CO2) 40 (H)   Urea Nitrogen 55.5 (H)   Creatinine 0.79   GFR Estimate 90   Calcium 11.7 (H)   Anion Gap 5 (L)   Magnesium 2.2   Glucose 121 (H)   WBC 9.6   Hemoglobin 17.5 (H)   Hematocrit 60.7 (H)   Platelet Count 150   RBC Count 7.30 (H)   MCV 83   MCH 24.0 (L)   MCHC 28.8 (L)   RDW 20.7 (H)   % Neutrophils 56   % Lymphocytes 30   % Monocytes 11   % Eosinophils 2   % Basophils 1      XR CHEST 1 VIEW  LOCATION: [facility]  DATE: 12/9/2023  INDICATION: PICC, V tach  COMPARISON: 12/08/2023  IMPRESSION: Right-sided PICC line with tip in the distal SVC. Stable cardiomegaly with unchanged mediastinal " "contours. Pulmonary vascular congestion with mild interstitial edema persists. Increased opacity in the left lower lobe due to atelectasis versus edema with likely a small left pleural effusion. No pneumothorax.    Echocardiogram 12/12/2023  Left ventricular function is decreased. The ejection fraction is 50-55%  (borderline).  The right ventricle is not well visualized.  The right ventricle is mildly dilated.  Mildly decreased right ventricular systolic function  Small pericardial effusion  There are no echocardiographic indications of cardiac tamponade.  Technically difficult, suboptimal study.    Coronary angiogram 12/8/2023  Multivessel CAD as described below:  LM: Normal  LAD: Diffuse mild disease with a focal moderate lesion in the distal LAD.  DFR is positive (0.84) beyond the distal LAD lesion, but DFR pullback shows gradual change in DFR, with no focal \"step-up\" to suggest a benefit of PCI.  LCx: Codominant vessel with severe stenosis downstream in the L PDA.  This is a relatively small vessel at this point (roughly 2 mm in diameter).  RCA: Codominant vessel with chronic subtotal occlusion in the mid vessel, with ALVINA I flow distally.  Severe WHO II (postcapillary) pulmonary hypertension.  Severely elevated pulmonary capillary wedge pressure (~45 mmHg).  Normal cardiac index by Tiana.      By:  Quincy Kyle MD, 12/17/2023  10:03 AM    Primary Care Physician:  Michell Lloyd             "

## 2023-12-17 NOTE — PROGRESS NOTES
Received a call from Evelyne Nieves and RT at [facility]. They would like to qualify this patient for a home BiPAP S or AVAPS device. Upon speaking with Evelyne, we are most likely using the diagnosis OHS for this patient. The qualifications for OHS are:    Night #1:    Baseline ABG this evening  Patient off BiPAP or AVAPS and on prescribed FIO2 for the night or as long as can be tolerated and another ABG immediately upon awakening to see if PaCO2 increases by 7 mmhg or more from the baseline line.  Bedside spirometry    This qualifies for a BiPAP S ()    Night 2:    Patient wear the V60 BiPAP at a RR of 4 because that's the lowest RR setting on the V60 and get an ABG immediately upon awakening to see if PaCO2 increases by 7 mmhg or more from the original baseline    This qualifies for an AVAPS/IVAPS ()    Jacqui BANDA  [phone]

## 2023-12-18 ENCOUNTER — APPOINTMENT (OUTPATIENT)
Dept: OCCUPATIONAL THERAPY | Facility: HOSPITAL | Age: 53
DRG: 286 | End: 2023-12-18
Attending: INTERNAL MEDICINE
Payer: COMMERCIAL

## 2023-12-18 ENCOUNTER — APPOINTMENT (OUTPATIENT)
Dept: PHYSICAL THERAPY | Facility: HOSPITAL | Age: 53
DRG: 286 | End: 2023-12-18
Attending: INTERNAL MEDICINE
Payer: COMMERCIAL

## 2023-12-18 LAB
ANION GAP SERPL CALCULATED.3IONS-SCNC: 6 MMOL/L (ref 7–15)
BASE EXCESS BLDA CALC-SCNC: 12.1 MMOL/L
BUN SERPL-MCNC: 46 MG/DL (ref 6–20)
CALCIUM SERPL-MCNC: 11.3 MG/DL (ref 8.6–10)
CHLORIDE SERPL-SCNC: 92 MMOL/L (ref 98–107)
COHGB MFR BLD: 92.2 % (ref 96–97)
CREAT SERPL-MCNC: 0.62 MG/DL (ref 0.51–0.95)
DEPRECATED HCO3 PLAS-SCNC: 37 MMOL/L (ref 22–29)
EGFRCR SERPLBLD CKD-EPI 2021: >90 ML/MIN/1.73M2
GLUCOSE BLDC GLUCOMTR-MCNC: 110 MG/DL (ref 70–99)
GLUCOSE BLDC GLUCOMTR-MCNC: 130 MG/DL (ref 70–99)
GLUCOSE BLDC GLUCOMTR-MCNC: 133 MG/DL (ref 70–99)
GLUCOSE BLDC GLUCOMTR-MCNC: 143 MG/DL (ref 70–99)
GLUCOSE SERPL-MCNC: 111 MG/DL (ref 70–99)
HCO3 BLD-SCNC: 37 MMOL/L (ref 23–29)
MAGNESIUM SERPL-MCNC: 2 MG/DL (ref 1.7–2.3)
OXYHGB MFR BLD: 90.5 % (ref 96–97)
PCO2 BLD: 61 MM HG (ref 35–45)
PH BLD: 7.39 [PH] (ref 7.37–7.44)
PLATELET # BLD AUTO: 171 10E3/UL (ref 150–450)
PO2 BLD: 63 MM HG (ref 80–90)
POTASSIUM SERPL-SCNC: 3.1 MMOL/L (ref 3.4–5.3)
POTASSIUM SERPL-SCNC: 3.9 MMOL/L (ref 3.4–5.3)
SODIUM SERPL-SCNC: 135 MMOL/L (ref 135–145)
TEMPERATURE: 37 DEGREES C

## 2023-12-18 PROCEDURE — 82805 BLOOD GASES W/O2 SATURATION: CPT | Performed by: INTERNAL MEDICINE

## 2023-12-18 PROCEDURE — 80048 BASIC METABOLIC PNL TOTAL CA: CPT | Performed by: INTERNAL MEDICINE

## 2023-12-18 PROCEDURE — 99232 SBSQ HOSP IP/OBS MODERATE 35: CPT | Performed by: NURSE PRACTITIONER

## 2023-12-18 PROCEDURE — 250N000013 HC RX MED GY IP 250 OP 250 PS 637: Performed by: INTERNAL MEDICINE

## 2023-12-18 PROCEDURE — 97116 GAIT TRAINING THERAPY: CPT | Mod: GP

## 2023-12-18 PROCEDURE — 84132 ASSAY OF SERUM POTASSIUM: CPT | Performed by: INTERNAL MEDICINE

## 2023-12-18 PROCEDURE — 97535 SELF CARE MNGMENT TRAINING: CPT | Mod: GO

## 2023-12-18 PROCEDURE — 250N000011 HC RX IP 250 OP 636: Mod: JZ | Performed by: INTERNAL MEDICINE

## 2023-12-18 PROCEDURE — 99233 SBSQ HOSP IP/OBS HIGH 50: CPT | Performed by: INTERNAL MEDICINE

## 2023-12-18 PROCEDURE — 85049 AUTOMATED PLATELET COUNT: CPT | Performed by: INTERNAL MEDICINE

## 2023-12-18 PROCEDURE — 120N000013 HC R&B IMCU

## 2023-12-18 PROCEDURE — 83735 ASSAY OF MAGNESIUM: CPT | Performed by: INTERNAL MEDICINE

## 2023-12-18 PROCEDURE — 99207 PR CDG-CUT & PASTE-POTENTIAL IMPACT ON LEVEL: CPT | Performed by: INTERNAL MEDICINE

## 2023-12-18 RX ORDER — MAGNESIUM OXIDE 400 MG/1
400 TABLET ORAL EVERY 4 HOURS
Status: COMPLETED | OUTPATIENT
Start: 2023-12-18 | End: 2023-12-18

## 2023-12-18 RX ORDER — POTASSIUM CHLORIDE 1500 MG/1
40 TABLET, EXTENDED RELEASE ORAL ONCE
Status: COMPLETED | OUTPATIENT
Start: 2023-12-18 | End: 2023-12-18

## 2023-12-18 RX ADMIN — MAGNESIUM OXIDE TAB 400 MG (241.3 MG ELEMENTAL MG) 400 MG: 400 (241.3 MG) TAB at 11:36

## 2023-12-18 RX ADMIN — ROSUVASTATIN CALCIUM 40 MG: 40 TABLET, COATED ORAL at 08:51

## 2023-12-18 RX ADMIN — FUROSEMIDE 40 MG: 40 TABLET ORAL at 09:02

## 2023-12-18 RX ADMIN — DOCUSATE SODIUM 100 MG: 100 CAPSULE, LIQUID FILLED ORAL at 08:51

## 2023-12-18 RX ADMIN — FAMOTIDINE 20 MG: 20 TABLET ORAL at 22:03

## 2023-12-18 RX ADMIN — NICOTINE 1 PATCH: 21 PATCH, EXTENDED RELEASE TRANSDERMAL at 16:32

## 2023-12-18 RX ADMIN — Medication 81 MG: at 08:51

## 2023-12-18 RX ADMIN — ENOXAPARIN SODIUM 40 MG: 40 INJECTION SUBCUTANEOUS at 22:01

## 2023-12-18 RX ADMIN — FUROSEMIDE 40 MG: 40 TABLET ORAL at 16:31

## 2023-12-18 RX ADMIN — CLOPIDOGREL BISULFATE 75 MG: 75 TABLET ORAL at 09:44

## 2023-12-18 RX ADMIN — POTASSIUM CHLORIDE 40 MEQ: 1500 TABLET, EXTENDED RELEASE ORAL at 08:58

## 2023-12-18 RX ADMIN — SPIRONOLACTONE 12.5 MG: 25 TABLET ORAL at 22:02

## 2023-12-18 RX ADMIN — POLYETHYLENE GLYCOL 3350 17 G: 17 POWDER, FOR SOLUTION ORAL at 08:58

## 2023-12-18 RX ADMIN — SPIRONOLACTONE 12.5 MG: 25 TABLET ORAL at 08:52

## 2023-12-18 RX ADMIN — BUPROPION HYDROCHLORIDE 150 MG: 150 TABLET, FILM COATED, EXTENDED RELEASE ORAL at 08:51

## 2023-12-18 RX ADMIN — DOCUSATE SODIUM 100 MG: 100 CAPSULE, LIQUID FILLED ORAL at 22:03

## 2023-12-18 RX ADMIN — MAGNESIUM OXIDE TAB 400 MG (241.3 MG ELEMENTAL MG) 400 MG: 400 (241.3 MG) TAB at 08:51

## 2023-12-18 RX ADMIN — ENOXAPARIN SODIUM 40 MG: 40 INJECTION SUBCUTANEOUS at 11:14

## 2023-12-18 ASSESSMENT — ACTIVITIES OF DAILY LIVING (ADL)
ADLS_ACUITY_SCORE: 29
ADLS_ACUITY_SCORE: 29
ADLS_ACUITY_SCORE: 26
ADLS_ACUITY_SCORE: 29
ADLS_ACUITY_SCORE: 29
ADLS_ACUITY_SCORE: 26
ADLS_ACUITY_SCORE: 26
ADLS_ACUITY_SCORE: 29
ADLS_ACUITY_SCORE: 26
ADLS_ACUITY_SCORE: 26

## 2023-12-18 NOTE — PLAN OF CARE
Goal Outcome Evaluation:      Plan of Care Reviewed With: patient    Overall Patient Progress: improvingOverall Patient Progress: improving    Outcome Evaluation: A&Ox4.  Sinus rhythm with a BBB.  Requires 2 LPM O2 via NC.  Ambulating well in room.  Recieved PRN Tylenol for headache, otherwise no pain.      Problem: Pulmonary Impairment  Goal: Optimal Gas Exchange  Outcome: Progressing     Problem: Pulmonary Impairment  Goal: Improved Activity Tolerance  Outcome: Progressing

## 2023-12-18 NOTE — PLAN OF CARE
Physical Therapy Discharge Summary    Reason for therapy discharge:    All goals and outcomes met, no further needs identified.    Progress towards therapy goal(s). See goals on Care Plan in Epic electronic health record for goal details.  Goals met    Therapy recommendation(s):    Continue home exercise program. Pt should walk at least 5 times a day with RN staff while hospitalized to improve endurance. No further skilled PT needs at this time d/t being ind w/ all mobility. Should pt status change, please re-order. Will discontinue PT orders.

## 2023-12-18 NOTE — PLAN OF CARE
Goal Outcome Evaluation:      Plan of Care Reviewed With: patient    Overall Patient Progress: improvingOverall Patient Progress: improving    Heart Failure Care Map  GOALS TO BE MET BEFORE DISCHARGE:    1. Decrease congestion and/or edema with diuretic therapy to achieve near optimal volume status.     Dyspnea improved: Yes, satisfactory for discharge.   Edema improved: Yes, satisfactory for discharge.        Last 24 hour I/O:   Intake/Output Summary (Last 24 hours) at 12/18/2023 1322  Last data filed at 12/18/2023 1300  Gross per 24 hour   Intake 1350 ml   Output 2325 ml   Net -975 ml           Net I/O and Weights since admission:   11/18 1500 - 12/18 1459  In: 8990.9 [P.O.:8165; I.V.:825.9]  Out: 85874 [Urine:91363]  Net: -95970.1     Vitals:    12/08/23 0730 12/08/23 1639 12/09/23 0100 12/10/23 0600   Weight: (!) 151.5 kg (334 lb) 143.1 kg (315 lb 8 oz) 138.8 kg (306 lb) 134 kg (295 lb 6.7 oz)    12/11/23 0650 12/12/23 1200 12/13/23 0600 12/14/23 0600   Weight: 132.6 kg (292 lb 5.3 oz) 131.8 kg (290 lb 9.6 oz) 132.5 kg (292 lb 1.8 oz) 130.2 kg (287 lb 0.6 oz)    12/15/23 0400 12/16/23 0600 12/17/23 0600 12/18/23 0600   Weight: 128.1 kg (282 lb 6.6 oz) 129.4 kg (285 lb 4.8 oz) 127.1 kg (280 lb 1.6 oz) 126.9 kg (279 lb 12.8 oz)       2.  O2 sats > 90% on room air, or at prior home O2 therapy level.      Able to wean O2 this shift to keep sats above 90%?: No, further care required to meet this goal. Please explain down to 1L per NC   Does patient use Home O2? No          Current oxygenation status:   SpO2: 91 %     O2 Device: Nasal cannula, Oxygen Delivery: 1 LPM    3.  Tolerates ambulation and mobility near baseline.     Ambulation: Yes, satisfactory for discharge.   Times patient ambulated with staff this shift: 4    Please review the Heart Failure Care Map for additional HF goal outcomes.    Sari Woodson RN  12/18/2023

## 2023-12-18 NOTE — PROGRESS NOTES
Night #1:     Baseline ABG this evening Done 12/17  Patient off BiPAP or AVAPS and on prescribed FIO2 for the night or as long as can be tolerated and another ABG immediately upon awakening to see if PaCO2 increases by 7 mmhg or more from the baseline line. Done 12/18    C02 changed from 54 to 61 which is an increase of 7 mmHg    Recent Labs   Lab 12/18/23  0841 12/17/23  2201 12/12/23  0618 12/11/23  2036   PH 7.39 7.42 7.39 7.42   PCO2 61* 54* 81* 73*   PO2 63* 71* 74* 69*   HCO3 37* 35* 49* 48*   O2PER  --   --  0.6 0.6       Bedside spirometry  Completed on, please see results under Media and pulmonary function.        This qualifies for a BiPAP S ()     Night 2: To complete tonight 12/18/23     Patient wear the V60 BiPAP at a RR of 4 because that's the lowest RR setting on the V60 and get an ABG immediately upon awakening to see if PaCO2 increases by 7 mmhg or more from the original baseline          Evelyne Nieves, RT

## 2023-12-18 NOTE — PROGRESS NOTES
"    Cardiology Progress Note    Assessment:  Acute on chronic heart failure preserved ejection fraction, improved volume status  Coronary artery disease, severe nondominant RCA disease, no angina  Morbid obesity  Pulm hypertension secondary to sleep apnea obesity hypoventilation syndrome  Polycythemia  Diabetes mellitus      Plan:  Discussed with interventional cardiologist-intervention to RCA will be of questionable benefit considering small size of the vessel and absence of angina  Resume losartan  Continue current diuretics    Discharge as per hospitalist    Follow-up with Dr. Miranda in 1 to 2 months and CHF nurse practitioner in 1 to 2 weeks    Subjective:   Denies chest pain, feels less short of breath  Able to walk in the [place] without shortness of breath or chest pain    Objective:   /75 (BP Location: Left arm)   Pulse 74   Temp 97.8  F (36.6  C) (Oral)   Resp 22   Ht 1.6 m (5' 3\")   Wt 126.9 kg (279 lb 12.8 oz)   LMP  (LMP Unknown)   SpO2 90%   BMI 49.56 kg/m      Intake/Output Summary (Last 24 hours) at 12/18/2023 1016  Last data filed at 12/18/2023 0400  Gross per 24 hour   Intake 1350 ml   Output 1300 ml   Net 50 ml         Physical Exam:  GENERAL: no distress  NECK: No JVD  LUNGS: Clear to auscultation.  CARDIAC: regular  rhythm, S1 & S2 normal.  No heaves, thrills, gallops or murmurs.  ABDOMEN: flat, negative hepatosplenomegaly, soft and non-tender.  EXTREMITIES: No evidence of cyanosis, clubbing 1+ edema.    Current Facility-Administered Medications Ordered in Epic   Medication Dose Route Frequency Provider Last Rate Last Admin    acetaminophen (TYLENOL) tablet 650 mg  650 mg Oral Q4H PRN Josh Greenberg MD   650 mg at 12/17/23 2211    albuterol (PROVENTIL) neb solution 2.5 mg  2.5 mg Nebulization Q4H PRN Josh Greenberg MD        aspirin EC tablet 81 mg  81 mg Oral Daily Josh Greenberg MD   81 mg at 12/18/23 0851    bisacodyl (DULCOLAX) suppository 10 mg  10 mg Rectal Daily Josh Greenberg " MD CHARLINE   10 mg at 12/16/23 1227    buPROPion (WELLBUTRIN XL) 24 hr tablet 150 mg  150 mg Oral QAM Josh Greenberg MD   150 mg at 12/18/23 0851    calcium carbonate (TUMS) chewable tablet 500 mg  500 mg Oral TID PRN Josh Greenberg MD        carboxymethylcellulose PF (REFRESH PLUS) 0.5 % ophthalmic solution 1 drop  1 drop Both Eyes TID PRN Josh Greenberg MD        clopidogrel (PLAVIX) tablet 75 mg  75 mg Oral Daily Josh Greenberg MD   75 mg at 12/18/23 0944    glucose gel 15-30 g  15-30 g Oral Q15 Min PRN Josh Greenberg MD        Or    dextrose 50 % injection 25-50 mL  25-50 mL Intravenous Q15 Min PRN Josh Greenberg MD        Or    glucagon injection 1 mg  1 mg Subcutaneous Q15 Min PRN Josh Greenberg MD        docusate sodium (COLACE) capsule 100 mg  100 mg Oral BID Josh Greenberg MD   100 mg at 12/18/23 0851    [Held by provider] enoxaparin ANTICOAGULANT (LOVENOX) injection 40 mg  40 mg Subcutaneous Q12H Josh Greenberg MD   40 mg at 12/17/23 2216    famotidine (PEPCID) tablet 20 mg  20 mg Oral At Bedtime Josh Greenberg MD   20 mg at 12/17/23 2211    furosemide (LASIX) tablet 40 mg  40 mg Oral BID Rashaun Rossi MD   40 mg at 12/18/23 0902    Hold: metformin and metformin containing medications on day of the procedure and for 48 hours after IV contrast given- Patients with acute kidney injury or severe chronic kidney disease (stage IV or stage V; i.e., eGFR less than 30)   Does not apply HOLD Josh Greenberg MD        hydrALAZINE (APRESOLINE) injection 5 mg  5 mg Intravenous Q6H PRN Josh Greenberg MD        insulin aspart (NovoLOG) injection (RAPID ACTING)  1-5 Units Subcutaneous At Bedtime Nathan Brown MD   3 Units at 12/16/23 2124    insulin aspart (NovoLOG) injection (RAPID ACTING)  1-7 Units Subcutaneous TID AC Josh Greenberg MD   1 Units at 12/17/23 1342    lidocaine (LMX4) cream   Topical Q1H PRN Josh Greenberg MD        lidocaine 1 % 0.1-1 mL  0.1-1 mL Other Q1H PRN Josh Greenberg MD         losartan (COZAAR) tablet 25 mg  25 mg Oral Daily Jean Pierre Stephenson MD        magnesium oxide (MAG-OX) tablet 400 mg  400 mg Oral Q4H Josh Greenberg MD   400 mg at 12/18/23 0851    melatonin tablet 3 mg  3 mg Oral At Bedtime PRN Josh Greenberg MD        naloxone (NARCAN) injection 0.2 mg  0.2 mg Intravenous Q2 Min PRN Josh Greenberg MD        Or    naloxone (NARCAN) injection 0.4 mg  0.4 mg Intravenous Q2 Min PRN Josh Greenberg MD        Or    naloxone (NARCAN) injection 0.2 mg  0.2 mg Intramuscular Q2 Min PRN Josh Greenberg MD        Or    naloxone (NARCAN) injection 0.4 mg  0.4 mg Intramuscular Q2 Min PRN Josh Greenberg MD        nicotine (NICODERM CQ) 21 MG/24HR 24 hr patch 1 patch  1 patch Transdermal Daily Josh Greenberg MD   1 patch at 12/17/23 1748    nicotine Patch in Place   Transdermal Q8H Josh Greenberg MD        ondansetron (ZOFRAN) injection 4 mg  4 mg Intravenous Q6H PRN Josh Greenberg MD        polyethylene glycol (MIRALAX) Packet 17 g  17 g Oral Daily Josh Greenberg MD   17 g at 12/18/23 0858    rosuvastatin (CRESTOR) tablet 40 mg  40 mg Oral Daily Josh Greenberg MD   40 mg at 12/18/23 0851    sodium chloride (OCEAN) 0.65 % nasal spray 1 spray  1 spray Both Nostrils Q1H PRN Josh Greenberg MD        sodium chloride (OCEAN) 0.65 % nasal spray 2 spray  2 spray Both Nostrils Q1H PRN Josh Greenberg MD        sodium chloride (PF) 0.9% PF flush 10-40 mL  10-40 mL Intracatheter Once PRN Josh Greenberg MD        sodium chloride (PF) 0.9% PF flush 3 mL  3 mL Intracatheter Q8H Josh Greenberg MD   3 mL at 12/18/23 0427    sodium chloride (PF) 0.9% PF flush 3 mL  3 mL Intracatheter q1 min prn Josh Greenberg MD        spironolactone (ALDACTONE) half-tab 12.5 mg  12.5 mg Oral BID Josh Greenberg MD   12.5 mg at 12/18/23 4437     Current Outpatient Medications Ordered in Epic   Medication Sig Dispense Refill    torsemide (DEMADEX) 20 MG tablet Take 2 tablets (40 mg) once daily on 12/9, 12/10,  "12/11, and 12/12.  Get your labs drawn as ordered on either 12/11 or 12/12.  You will then be contacted with recommendations for what dose to take after this.  If you do not hear from our clinic by 12/13, you should plan on reducing your dose down to 1 tablet (20 mg) daily on 12/13. 90 tablet 3    buPROPion (WELLBUTRIN XL) 150 MG 24 hr tablet TAKE 1 TABLET(150 MG) BY MOUTH EVERY MORNING 90 tablet 2       Cardiographics:    Telemetry: Normal sinus rhythm    Echocardiogram:   Left ventricular function is decreased. The ejection fraction is 50-55%  (borderline).  The right ventricle is not well visualized.  The right ventricle is mildly dilated.  Mildly decreased right ventricular systolic function  Small pericardial effusion  There are no echocardiographic indications of cardiac tamponade.  Technically difficult, suboptimal study.     Coronary angio:  Multivessel CAD as described below:  LM: Normal  LAD: Diffuse mild disease with a focal moderate lesion in the distal LAD.  DFR is positive (0.84) beyond the distal LAD lesion, but DFR pullback shows gradual change in DFR, with no focal \"step-up\" to suggest a benefit of PCI.  LCx: Codominant vessel with severe stenosis downstream in the L PDA.  This is a relatively small vessel at this point (roughly 2 mm in diameter).  RCA: Codominant vessel with chronic subtotal occlusion in the mid vessel, with ALVINA I flow distally.  Severe WHO II (postcapillary) pulmonary hypertension.  Severely elevated pulmonary capillary wedge pressure (~45 mmHg).  Normal cardiac index by Tiana.   Lab Results    Chemistry/lipid CBC Cardiac Enzymes/BNP/TSH/INR   Recent Labs   Lab Test 12/08/23  0754   CHOL 149   HDL 34*   LDL 93   TRIG 108     Recent Labs   Lab Test 12/08/23  0754 11/01/23  0822   LDL 93 112*     Recent Labs   Lab Test 12/18/23  0833 12/18/23  0609   NA  --  135   POTASSIUM  --  3.1*   CHLORIDE  --  92*   CO2  --  37*   * 111*   BUN  --  46.0*   CR  --  0.62   GFRESTIMATED  " "--  >90   IGNACIO  --  11.3*     Recent Labs   Lab Test 12/18/23  0609 12/17/23  0413 12/16/23  0549   CR 0.62 0.79 0.86     Recent Labs   Lab Test 11/01/23  0822   A1C 7.2*          Recent Labs   Lab Test 12/18/23  0609 12/17/23 0413   WBC  --  9.6   HGB  --  17.5*   HCT  --  60.7*   MCV  --  83    150     Recent Labs   Lab Test 12/17/23  0413 12/15/23  0344 12/14/23  0628   HGB 17.5* 18.0* 18.0*    No results for input(s): \"TROPONINI\" in the last 90289 hours.  Recent Labs   Lab Test 12/08/23 2028 11/21/23  1716   NTBNPI 749  --    NTBNP  --  951*     Recent Labs   Lab Test 12/08/23 2028   TSH 1.47     No results for input(s): \"INR\" in the last 71637 hours.                "

## 2023-12-18 NOTE — PROGRESS NOTES
[facility]    PROGRESS NOTE - Hospitalist Service    Assessment and Plan  53 year old female with morbid obesity, cad, dm came in for elective cardiac angiogram. During angiogram, was found to have severely elevated pulmonary wedge pressures, decompensated CHF.  Admitted to the ICU on BiPAP, ultimately required Bumex drip for diuresis.  downgraded from ICU 12/10. Ongoing cardiology workup and medication titration. Ideally would not discharge until home AVAPS arranged, was hypoxic and requires 50 L, now is improving and down to 2 L        Acute on chronic hypercapnic and hypoxic resp failure  -ABG on admit showed severe hypercarbia and acidosis- started on BiPAP in ICU but acidosis remained significant, ongoing adjustment of BiPAP settings by intensivist, requiring very high settings with AVAPS.  After diuresis, now able to tolerate high flow nasal cannula during the day, she will need AVAPS at nighttime. Overnight oximetry study completed last night.  - high risk for not only JAMES (never had sleep study), but also hypoventilation syndrome, as well as has severe pulm htn noted on angio.  This is presumably chronic, patient had not been seeing a doctor in some time  - Pulm working on arrangements for home AVAPS  - Scheduled albuterol nebs  - was severely hypoxic and required 50 L.  -Significant improvement today on the oxygen requirement is down to 1-2 L  - Transferred out of ICU on 12/16/2023  - Plan to discharge home with NIPPV as per pulmonology, pending authorization for NIPPV     Acute diastolic HF  - UOP was poor with bolus dose diuretics, changed to Bumex drip and given Diuril IV with immediate improvement.  UOP worse again on bolus dose Bumex, Bumex drip resumed last night. >40 lbs down from admission. Now cardiology changing her to PO diuretic plus metolazone and spironolactone. Will want to monitor closely to ensure adequate diuresis on this regimen.  -cardiology following  -Katherine  out, voiding well  -Trend BMP  -cardiology considering RHC  -Diuresis is resumed-Blood pressure is on the low side and patient   -  Levophed drip is ordered by pulmonology but never was started as blood pressure is stable  -Significant improvement with IV diuresis, patient lost about 40 pounds  - Transition to oral Lasix today  -Plan for coronary angiogram today was canceled by cardiology    Acute encephalopathy  -Metabolic secondary to hypercarbia  -Resolved with  -monitor as indicator of adequacy of NIPPV     Coronary artery disease  - s/p cath, no stents placed due to poor hemodynamics at time of cath and need for urgent diuresis. Could benefit from stenting of mid RCA and L PDA, cardiology planning to discuss with CV surgery before considering another PCI attempt. Has multiple other areas of disease not amenable to stenting.  - ASA, statin, BB  - CV surgery consult, plan for medical treatment at this point  - Plan for coronary angiogram today was canceled by cardiology.     Diabetes mellitus type 2  -Cover with sliding scale  - recent A1c 7.2  - hold po metformin for now  - cardiology considering Jardiance or Ozempic, no objection to either from my standpoint     Morbid obesity  - BMI 51  - consider outpatient Bariatrics eval, this is a life limiting diagnosis at this point    Hypokalemia  - Secondary to diuresis  - Replace per protocol  - Unremarkable mag level    Hyponatremia  - Secondary to CHF  - Improving with diuresis  - Continue to monitor sodium level     Hypertension  -home losartan     Depression  -home Wellbutrin     Smoking dependence  -Patch  -Requested help quitting     Nonsustained V. Tach  - Attributed to PICC line, resolved after PICC line was pulled back 2 cm  - Continue to monitor on telemetry for now     Polycythemia  -Secondary to chronic hypoxia  -Stable hemoglobin around 18    35 MINUTES SPENT BY ME on the date of service doing chart review, history, exam, documentation & further  activities per the note    Principal Problem:    CHF (congestive heart failure) (H)  Active Problems:    Diabetes mellitus, type 2 (H)    Hypertension, unspecified type    Atherosclerosis of aorta (H24)    Nonspecific abnormal electrocardiogram (ECG) (EKG)    Morbid obesity (H)    SOB (shortness of breath)    Pulmonary hypertension (H)    Nicotine dependence      VTE prophylaxis:  Enoxaparin (Lovenox) SQ  DIET: Orders Placed This Encounter      Diet      Low Saturated Fat Na <2400 mg      Disposition/Barriers to discharge: Authorization for NIPPV, hypoxia  Code Status: Full Code    Subjective:  Measha continue to improve with shortness of breath, denies chest pain.  Still hypoxic, requires 1-2  L of oxygen    PHYSICAL EXAM  Vitals:    12/16/23 0600 12/17/23 0600 12/18/23 0600   Weight: 129.4 kg (285 lb 4.8 oz) 127.1 kg (280 lb 1.6 oz) 126.9 kg (279 lb 12.8 oz)     B/P:113/68 T:98.3 P:79 R:16     Intake/Output Summary (Last 24 hours) at 12/18/2023 1558  Last data filed at 12/18/2023 1300  Gross per 24 hour   Intake 990 ml   Output 1925 ml   Net -935 ml      Body mass index is 49.56 kg/m .    Constitutional: awake, alert, cooperative, no apparent distress, and appears stated age  Eyes: Lids and lashes normal, pupils equal, round  Respiratory: No increased work of breathing, good air exchange, clear to auscultation bilaterally, no crackles or wheezing  Cardiovascular: Normal apical impulse, regular rate and rhythm, normal S1 and S2, no S3 or S4, and no murmur noted  GI: Morbidly obese no scars, normal bowel sounds, soft, non-distended, non-tender, no masses palpated, no hepatosplenomegally  Skin: no bruising or bleeding and normal skin color, texture, turgor  Musculoskeletal: There is no redness, warmth, or swelling of the joints.  Full range of motion noted.  Bilateral +1 lower extremity pitting edema present  Neurologic: Awake, alert, oriented to name, place and time.  Cranial nerves II-XII are grossly intact.  Motor  is 5 out of 5 bilaterally.   Sensory is intact.    Neuropsychiatric: Appropriate with examiner      PERTINENT LABS/IMAGING:    I have personally reviewed the following data over the past 24 hrs:    N/A  \   N/A   / 171     135 92 (L) 46.0 (H) /  143 (H)   3.9 37 (H) 0.62 \       Imaging results reviewed over the past 24 hrs:   No results found for this or any previous visit (from the past 24 hour(s)).    Discussed with patient, family, pulmonology, nursing staff and discharge planner    Josh Greenberg MD  [facility] Medicine Service  [phone]

## 2023-12-18 NOTE — PROGRESS NOTES
Pulmonary Progress Note  12/18/2023   8:53 AM    Problem List:   Principal Problem:    CHF (congestive heart failure) (H)  Active Problems:    Diabetes mellitus, type 2 (H)    Hypertension, unspecified type    Atherosclerosis of aorta (H24)    Nonspecific abnormal electrocardiogram (ECG) (EKG)    Morbid obesity (H)    SOB (shortness of breath)    Pulmonary hypertension (H)    Nicotine dependence       Plan:   - Follow up bedside spirometry today to see if she qualifies for BiPAP. Her ABGs from last night to today are qualifying.   - Angiogram per Cards - cancelled  - diuresis per Cards     Caron Reyes, [facility] Pulmonary/Critical Care    ________________________________________________________________      CC: No chief complaint on file.    HPI: Sammy Laws is a 53 year old female with history of morbid obesity, Body mass index is 51.74 kg/m ., DM2, HTN, tobacco user.   Presented on 12/8 for elective RHC and LHC. Found to have severe pulm HTN  (mPAP 52 mm Hg, CO 6.09 L/min, PVR 1.12 JOYNER), severely elevated left sided and right sided filling pressures (PCWP 45 mm Hg). Improving slowly with NIPPV, HFNC, aggressive diuresis with ~40lbs weight reduction since admission.     ROS: feels great - breathing much improved. No pain, no nausea    Objective:   Vitals:    12/18/23 0000 12/18/23 0400 12/18/23 0600 12/18/23 0836   BP: 132/70 119/78  127/75   BP Location: Left arm Left arm  Left arm   Pulse: 80 73  74   Resp: 18 20  22   Temp: 98  F (36.7  C) 98.3  F (36.8  C)  97.8  F (36.6  C)   TempSrc: Oral Oral  Oral   SpO2: 93% 93%  90%   Weight:   126.9 kg (279 lb 12.8 oz)    Height:           I/O:   Intake/Output Summary (Last 24 hours) at 12/18/2023 0853  Last data filed at 12/18/2023 0400  Gross per 24 hour   Intake 1350 ml   Output 1300 ml   Net 50 ml     Weight change: -0.136 kg (-4.8 oz)    Medications Reviewed: yes    Physical Exam:   General: awake, no distress  HEENT: AT/NC  NEURO: grossly  nonfocal   CARDIAC: RRR S1S2   PULMONARY: unlabored; lungs are clear   GASTROINTESTINAL: abdomen is soft without significant tenderness, masses, organomegaly or guarding  INTEGUMENT: visible skin intact  PSYCH: a&o; calm    Lab Results Reviewed:   Recent Labs   Lab 12/18/23  0609 12/16/23  0549 12/15/23  0344      < > 131*   CO2 37*   < > 41*   BUN 46.0*   < > 59.8*   ALKPHOS  --   --  77   ALT  --   --  13   AST  --   --  22    < > = values in this interval not displayed.       Recent Labs   Lab 12/18/23  0609 12/17/23  0413   WBC  --  9.6   HGB  --  17.5*   HCT  --  60.7*    150       Micro: none this admission    Imaging: all imaging personally reviewed   12/15 CXR - Right upper extremity PICC line catheter is in good position. Better inspiration. Bibasilar atelectasis, slightly improved on the left. Less pulmonary vascular congestion. Heart is normal size.     12/12 Echo - Left ventricular function is decreased. The ejection fraction is 50-55%  (borderline).  The right ventricle is not well visualized.  The right ventricle is mildly dilated.  Mildly decreased right ventricular systolic function  Small pericardial effusion  There are no echocardiographic indications of cardiac tamponade.  Technically difficult, suboptimal study.        Caron Reyes, [facility] Pulmonary/Critical Care

## 2023-12-19 VITALS
SYSTOLIC BLOOD PRESSURE: 108 MMHG | HEART RATE: 77 BPM | HEIGHT: 63 IN | RESPIRATION RATE: 18 BRPM | DIASTOLIC BLOOD PRESSURE: 61 MMHG | OXYGEN SATURATION: 91 % | BODY MASS INDEX: 49.58 KG/M2 | WEIGHT: 279.8 LBS | TEMPERATURE: 98.4 F

## 2023-12-19 LAB
ANION GAP SERPL CALCULATED.3IONS-SCNC: 5 MMOL/L (ref 7–15)
BASE EXCESS BLDA CALC-SCNC: 8.3 MMOL/L
BUN SERPL-MCNC: 33.8 MG/DL (ref 6–20)
CALCIUM SERPL-MCNC: 11.3 MG/DL (ref 8.6–10)
CHLORIDE SERPL-SCNC: 93 MMOL/L (ref 98–107)
COHGB MFR BLD: 96.6 % (ref 96–97)
CREAT SERPL-MCNC: 0.59 MG/DL (ref 0.51–0.95)
DEPRECATED HCO3 PLAS-SCNC: 34 MMOL/L (ref 22–29)
EGFRCR SERPLBLD CKD-EPI 2021: >90 ML/MIN/1.73M2
ERYTHROCYTE [DISTWIDTH] IN BLOOD BY AUTOMATED COUNT: 20.6 % (ref 10–15)
GLUCOSE BLDC GLUCOMTR-MCNC: 143 MG/DL (ref 70–99)
GLUCOSE SERPL-MCNC: 118 MG/DL (ref 70–99)
HCO3 BLD-SCNC: 33 MMOL/L (ref 23–29)
HCT VFR BLD AUTO: 60 % (ref 35–47)
HGB BLD-MCNC: 17.5 G/DL (ref 11.7–15.7)
MAGNESIUM SERPL-MCNC: 2 MG/DL (ref 1.7–2.3)
MCH RBC QN AUTO: 24.3 PG (ref 26.5–33)
MCHC RBC AUTO-ENTMCNC: 29.2 G/DL (ref 31.5–36.5)
MCV RBC AUTO: 83 FL (ref 78–100)
OXYHGB MFR BLD: 96 % (ref 96–97)
PCO2 BLD: 54 MM HG (ref 35–45)
PH BLD: 7.4 [PH] (ref 7.37–7.44)
PLATELET # BLD AUTO: 179 10E3/UL (ref 150–450)
PO2 BLD: 90 MM HG (ref 80–90)
POTASSIUM SERPL-SCNC: 3.5 MMOL/L (ref 3.4–5.3)
RBC # BLD AUTO: 7.21 10E6/UL (ref 3.8–5.2)
SODIUM SERPL-SCNC: 132 MMOL/L (ref 135–145)
TEMPERATURE: 37 DEGREES C
WBC # BLD AUTO: 9.4 10E3/UL (ref 4–11)

## 2023-12-19 PROCEDURE — 99239 HOSP IP/OBS DSCHRG MGMT >30: CPT | Performed by: INTERNAL MEDICINE

## 2023-12-19 PROCEDURE — 250N000013 HC RX MED GY IP 250 OP 250 PS 637: Performed by: INTERNAL MEDICINE

## 2023-12-19 PROCEDURE — 99232 SBSQ HOSP IP/OBS MODERATE 35: CPT | Performed by: NURSE PRACTITIONER

## 2023-12-19 PROCEDURE — 36600 WITHDRAWAL OF ARTERIAL BLOOD: CPT

## 2023-12-19 PROCEDURE — 85027 COMPLETE CBC AUTOMATED: CPT | Performed by: INTERNAL MEDICINE

## 2023-12-19 PROCEDURE — 83735 ASSAY OF MAGNESIUM: CPT | Performed by: INTERNAL MEDICINE

## 2023-12-19 PROCEDURE — 82805 BLOOD GASES W/O2 SATURATION: CPT | Performed by: NURSE PRACTITIONER

## 2023-12-19 PROCEDURE — 80048 BASIC METABOLIC PNL TOTAL CA: CPT | Performed by: INTERNAL MEDICINE

## 2023-12-19 PROCEDURE — 999N000157 HC STATISTIC RCP TIME EA 10 MIN

## 2023-12-19 PROCEDURE — 250N000011 HC RX IP 250 OP 636: Mod: JZ | Performed by: INTERNAL MEDICINE

## 2023-12-19 PROCEDURE — 94660 CPAP INITIATION&MGMT: CPT

## 2023-12-19 RX ORDER — FAMOTIDINE 20 MG/1
20 TABLET, FILM COATED ORAL AT BEDTIME
Qty: 9 TABLET | Refills: 0 | Status: SHIPPED | OUTPATIENT
Start: 2023-12-19 | End: 2023-12-19

## 2023-12-19 RX ORDER — ROSUVASTATIN CALCIUM 40 MG/1
40 TABLET, COATED ORAL DAILY
Qty: 90 TABLET | Refills: 0 | Status: SHIPPED | OUTPATIENT
Start: 2023-12-20 | End: 2024-02-08

## 2023-12-19 RX ORDER — ALBUTEROL SULFATE 90 UG/1
2 AEROSOL, METERED RESPIRATORY (INHALATION) EVERY 6 HOURS PRN
Qty: 18 G | Refills: 3 | Status: SHIPPED | OUTPATIENT
Start: 2023-12-19

## 2023-12-19 RX ORDER — LOSARTAN POTASSIUM 50 MG/1
25 TABLET ORAL DAILY
Qty: 90 TABLET | Refills: 3 | Status: SHIPPED | OUTPATIENT
Start: 2023-12-19 | End: 2024-01-02 | Stop reason: DRUGHIGH

## 2023-12-19 RX ORDER — MAGNESIUM OXIDE 400 MG/1
400 TABLET ORAL EVERY 4 HOURS
Status: COMPLETED | OUTPATIENT
Start: 2023-12-19 | End: 2023-12-19

## 2023-12-19 RX ORDER — POTASSIUM CHLORIDE 1500 MG/1
20 TABLET, EXTENDED RELEASE ORAL ONCE
Status: COMPLETED | OUTPATIENT
Start: 2023-12-19 | End: 2023-12-19

## 2023-12-19 RX ORDER — FAMOTIDINE 20 MG/1
20 TABLET, FILM COATED ORAL AT BEDTIME
Qty: 90 TABLET | Refills: 0 | Status: SHIPPED | OUTPATIENT
Start: 2023-12-19 | End: 2024-01-02

## 2023-12-19 RX ORDER — CLOPIDOGREL BISULFATE 75 MG/1
75 TABLET ORAL DAILY
Qty: 90 TABLET | Refills: 0 | Status: SHIPPED | OUTPATIENT
Start: 2023-12-20 | End: 2024-02-08

## 2023-12-19 RX ORDER — SPIRONOLACTONE 25 MG/1
12.5 TABLET ORAL
Qty: 90 TABLET | Refills: 0 | Status: SHIPPED | OUTPATIENT
Start: 2023-12-19 | End: 2024-02-08

## 2023-12-19 RX ADMIN — FUROSEMIDE 40 MG: 40 TABLET ORAL at 09:29

## 2023-12-19 RX ADMIN — ROSUVASTATIN CALCIUM 40 MG: 40 TABLET, COATED ORAL at 09:29

## 2023-12-19 RX ADMIN — SPIRONOLACTONE 12.5 MG: 25 TABLET ORAL at 09:29

## 2023-12-19 RX ADMIN — POTASSIUM CHLORIDE 20 MEQ: 1500 TABLET, EXTENDED RELEASE ORAL at 09:30

## 2023-12-19 RX ADMIN — NICOTINE 1 PATCH: 21 PATCH, EXTENDED RELEASE TRANSDERMAL at 16:40

## 2023-12-19 RX ADMIN — MAGNESIUM OXIDE TAB 400 MG (241.3 MG ELEMENTAL MG) 400 MG: 400 (241.3 MG) TAB at 09:30

## 2023-12-19 RX ADMIN — ENOXAPARIN SODIUM 40 MG: 40 INJECTION SUBCUTANEOUS at 09:30

## 2023-12-19 RX ADMIN — CLOPIDOGREL BISULFATE 75 MG: 75 TABLET ORAL at 09:30

## 2023-12-19 RX ADMIN — Medication 81 MG: at 09:29

## 2023-12-19 RX ADMIN — LOSARTAN POTASSIUM 25 MG: 25 TABLET, FILM COATED ORAL at 09:30

## 2023-12-19 RX ADMIN — BUPROPION HYDROCHLORIDE 150 MG: 150 TABLET, FILM COATED, EXTENDED RELEASE ORAL at 09:29

## 2023-12-19 ASSESSMENT — ACTIVITIES OF DAILY LIVING (ADL)
ADLS_ACUITY_SCORE: 24
ADLS_ACUITY_SCORE: 24
ADLS_ACUITY_SCORE: 25
ADLS_ACUITY_SCORE: 24
ADLS_ACUITY_SCORE: 24
ADLS_ACUITY_SCORE: 25
ADLS_ACUITY_SCORE: 24
ADLS_ACUITY_SCORE: 24
ADLS_ACUITY_SCORE: 25

## 2023-12-19 NOTE — PROGRESS NOTES
Pulmonary Progress Note  12/19/2023     Problem List:   Principal Problem:    CHF (congestive heart failure) (H)  Active Problems:    Diabetes mellitus, type 2 (H)    Hypertension, unspecified type    Atherosclerosis of aorta (H24)    Nonspecific abnormal electrocardiogram (ECG) (EKG)    Morbid obesity (H)    SOB (shortness of breath)    Pulmonary hypertension (H)    Nicotine dependence       Plan:   - Bedside spirometry shows FEV1/FVC at 92%; this plus her ABGs should qualify her for BiPAP per the hypoventilation guidelines. She does not qualify for AVAPS.   - She should have BiPAP at IPAP 16 EPAP 6  - Home O2 eval   - This patient suffers from hypoventilation syndrome as evidenced by elevated CO2 levels with sleep and BMI 49. A NiPPV device (BiPAP) is required to prevent future hospital admissions and improve daytime function. Ms Laws will benefit from continued support with NiPPV at night.     Caron Reyes, [facility] Pulmonary/Critical Care    ________________________________________________________________      CC: No chief complaint on file.    HPI: Sammy Laws is a 53 year old female with history of morbid obesity, Body mass index is 51.74 kg/m ., DM2, HTN, tobacco user.   Presented on 12/8 for elective RHC and LHC. Found to have severe pulm HTN  (mPAP 52 mm Hg, CO 6.09 L/min, PVR 1.12 JOYNER), severely elevated left sided and right sided filling pressures (PCWP 45 mm Hg). Improving slowly with NIPPV, HFNC, aggressive diuresis with ~40lbs weight reduction since admission.     ROS: no complaints this morning;     Objective:   Vitals:    12/19/23 0000 12/19/23 0300 12/19/23 0430 12/19/23 0930   BP:   120/71 108/61   BP Location:    Left arm   Pulse:  68 66 75   Resp:   19 18   Temp:    98.4  F (36.9  C)   TempSrc:    Oral   SpO2: 90% 92% 91% 90%   Weight:       Height:           I/O:   Intake/Output Summary (Last 24 hours) at 12/18/2023 0853  Last data filed at 12/18/2023 0400  Gross per 24  hour   Intake 1350 ml   Output 1300 ml   Net 50 ml     Weight change:     Medications Reviewed: yes    Physical Exam:   General: awake, no distress  HEENT: AT/NC  NEURO: grossly nonfocal   CARDIAC: RRR S1S2   PULMONARY: unlabored; lungs are clear   GASTROINTESTINAL: abdomen is soft without significant tenderness, masses, organomegaly or guarding  INTEGUMENT: visible skin intact  PSYCH: a&o; calm    Lab Results Reviewed:   Recent Labs   Lab 12/19/23  0440 12/16/23  0549 12/15/23  0344   *   < > 131*   CO2 34*   < > 41*   BUN 33.8*   < > 59.8*   ALKPHOS  --   --  77   ALT  --   --  13   AST  --   --  22    < > = values in this interval not displayed.       Recent Labs   Lab 12/19/23  0440   WBC 9.4   HGB 17.5*   HCT 60.0*          Micro: none this admission    Imaging: all imaging personally reviewed   12/15 CXR - Right upper extremity PICC line catheter is in good position. Better inspiration. Bibasilar atelectasis, slightly improved on the left. Less pulmonary vascular congestion. Heart is normal size.     12/12 Echo - Left ventricular function is decreased. The ejection fraction is 50-55%  (borderline).  The right ventricle is not well visualized.  The right ventricle is mildly dilated.  Mildly decreased right ventricular systolic function  Small pericardial effusion  There are no echocardiographic indications of cardiac tamponade.  Technically difficult, suboptimal study.        Caron Reyes, [facility] Pulmonary/Critical Care

## 2023-12-19 NOTE — PROGRESS NOTES
Care Management Discharge Note    Discharge Date: 12/19/2023       Discharge Disposition: Home    Discharge Services:      Discharge DME: Oxygen    Discharge Transportation: family or friend will provide    Private pay costs discussed: Not applicable    Does the patient's insurance plan have a 3 day qualifying hospital stay waiver?  Yes     Which insurance plan 3 day waiver is available? Alternative insurance waiver    Will the waiver be used for post-acute placement? No    PAS Confirmation Code:    Patient/family educated on Medicare website which has current facility and service quality ratings:      Education Provided on the Discharge Plan:    Persons Notified of Discharge Plans: patient   Patient/Family in Agreement with the Plan:      Handoff Referral Completed: Yes    Additional Information:  Home today, no care management needs identified    Ghada Warren RN

## 2023-12-19 NOTE — PROGRESS NOTES
Patient has been assessed for Home Oxygen needs. Oxygen readings:    *Pulse oximetry (SpO2) = 93% on room air at rest while awake.    *SpO2 improved to 95% on 2 liters/minute at rest.    *SpO2 = 87% on room air during activity/with exercise.    *SpO2 improved to 92% on 2 liters/minute during activity/with exercise.

## 2023-12-19 NOTE — PROGRESS NOTES
I certify that this patient, Sammy Laws has been under my care (or a nurse practitioner or physican's assistant working with me). This is the face-to-face encounter for oxygen medical necessity.      At the time of this encounter supplemental oxygen is reasonable and necessary and is expected to improve the patient's condition in a home setting.       Patient has continued oxygen desaturation due to Chronic Heart Failure I50.    If portability is ordered, is the patient mobile within the home? Yes    Patient has been assessed for Home Oxygen needs. Oxygen readings:    *Pulse oximetry (SpO2) = 86% on room air at rest while awake.    *SpO2 improved to 91 % on 1 liters/minute at rest.    *SpO2 = 86% on room air during activity/with exercise.    *SpO2 improved to 92% on 2 liters/minute during activity/with exercise.      Josh Greenberg MD  [facility] Medicine Service  [phone]

## 2023-12-19 NOTE — DISCHARGE SUMMARY
"[facility]  Hospitalist Discharge Summary      Date of Admission:  12/8/2023  Date of Discharge:  12/19/2023  Discharging Provider: Josh Greenberg MD  Discharge Service: Hospitalist Service    Discharge Diagnoses   Acute on chronic hypercapnic and hypoxic respiratory failure, discharged on home oxygen and BiPAP at IPAP 16 EPAP 6   Acute on chronic CHF, diastolic function  Obesity hypoventilation syndrome  Morbid obesity  Acute metabolic encephalopathy, resolved  Thrombocytosis, improving  Diabetes mellitus type 2  Coronary artery disease   Hypokalemia and hyponatremia, improved   Smoking dependence   Nonsustained V. tach, resolved  Depression    Clinically Significant Risk Factors     # DMII: A1C = 7.2 % (Ref range: 0.0 - 5.6 %) within past 6 months  # Severe Obesity: Estimated body mass index is 49.56 kg/m  as calculated from the following:    Height as of this encounter: 1.6 m (5' 3\").    Weight as of this encounter: 126.9 kg (279 lb 12.8 oz).       Follow-ups Needed After Discharge   Follow-up Appointments     Follow-up and recommended labs and tests       See AVS for recommended follow up appointments            Unresulted Labs Ordered in the Past 30 Days of this Admission       No orders found from 11/8/2023 to 12/9/2023.        These results will be followed up by PCP    Discharge Disposition   Discharged to home  Condition at discharge: Stable    Hospital Course    year old female with morbid obesity, cad, dm came in for elective cardiac angiogram. During angiogram, was found to have severely elevated pulmonary wedge pressures, decompensated CHF.  Admitted to the ICU on BiPAP, ultimately required Bumex drip for diuresis.  downgraded from ICU 12/10. Ongoing cardiology workup and medication titration. Ideally would not discharge until home AVAPS arranged, was hypoxic and requires 50 L, now is improving and down to 2 L        Acute on chronic hypercapnic and hypoxic resp failure  -ABG on " admit showed severe hypercarbia and acidosis- started on BiPAP in ICU but acidosis remained significant, ongoing adjustment of BiPAP settings by intensivist, requiring very high settings with AVAPS.  After diuresis, now able to tolerate high flow nasal cannula during the day, she will need AVAPS at nighttime. Overnight oximetry study completed last night.  - high risk for not only JAMES (never had sleep study), but also hypoventilation syndrome, as well as has severe pulm htn noted on angio.  This is presumably chronic, patient had not been seeing a doctor in some time  - Pulm working on arrangements for home AVAPS  - Scheduled albuterol nebs  - was severely hypoxic and required 50 L.  -Significant improvement today on the oxygen requirement is down to 1-2 L  - Transferred out of ICU on 12/16/2023  -Initial plan to discharge home with NIPPV as per pulmonology, pending authorization for NIPPV  -Discharge today on BiPAP at IPAP 16 EPAP 6   -Follow-up with pulmonology clinic as scheduled     Acute diastolic HF  - UOP was poor with bolus dose diuretics, changed to Bumex drip and given Diuril IV with immediate improvement.  UOP worse again on bolus dose Bumex, Bumex drip resumed last night. >40 lbs down from admission. Now cardiology changing her to PO diuretic plus metolazone and spironolactone. Will want to monitor closely to ensure adequate diuresis on this regimen.  -cardiology following  -Murphy out, voiding well  -Trend BMP  -cardiology considering RHC  -Diuresis is resumed-Blood pressure is on the low side and patient   -  Levophed drip is ordered by pulmonology but never was started as blood pressure is stable  -Significant improvement with IV diuresis, patient lost about 40 pounds  - Transition to oral Lasix today, switch to torsemide on discharge as per cardiology  -Plan for coronary angiogram today was canceled by cardiology  -Follow-up with cardiology as outpatient     Coronary artery disease  - s/p cath, no  stents placed due to poor hemodynamics at time of cath and need for urgent diuresis. Could benefit from stenting of mid RCA and L PDA, cardiology planning to discuss with CV surgery before considering another PCI attempt. Has multiple other areas of disease not amenable to stenting.  - ASA, statin, BB  - CV surgery consult, plan for medical treatment at this point  - Plan for coronary angiogram today was canceled by cardiology.     Diabetes mellitus type 2  -Cover with sliding scale  - recent A1c 7.2  - hold po metformin for now  - cardiology considering Jardiance or Ozempic, no objection to either from my standpoint     Morbid obesity  - BMI 51  -Discussed with patient bariatric surgery option referral  -Patient still undecided.    Acute encephalopathy  -Metabolic secondary to hypercarbia  -Resolved   -Back to baseline     Hypokalemia  - Secondary to diuresis  - Replaced per protocol  - Unremarkable mag level     Hyponatremia  - Secondary to CHF  - Improving with diuresis  - Continue to monitor sodium level     Hypertension  -home losartan  -Reduce losartan dose because of blood pressure on the low side     Depression  -home Wellbutrin     Smoking dependence  -Patch  -Requested help quitting     Nonsustained V. Tach  - Attributed to PICC line, resolved after PICC line was pulled back 2 cm  - Continue to monitor on telemetry for now     Hyperlipidemia  -Start statin     Polycythemia  -Secondary to chronic hypoxia  -hemoglobin around 18 on admission,  -Improving  -  continue to monitor CBC as outpatient    Discussed with patient, pulmonology, cardiology, nursing staff and discharge planner    Consultations This Hospital Stay   HOSPITALIST IP CONSULT  CARDIOLOGY IP CONSULT  PULMONARY IP CONSULT  VASCULAR ACCESS ADULT IP CONSULT  PHYSICAL THERAPY ADULT IP CONSULT  OCCUPATIONAL THERAPY ADULT IP CONSULT  CORE CLINIC EVALUATION IP CONSULT  CARDIOTHORACIC SURGERY IP CONSULT  CARE MANAGEMENT / SOCIAL WORK IP CONSULT    Code  Status   Full Code    Time Spent on this Encounter   I, Josh Greenberg MD, personally saw the patient today and spent greater than 30 minutes discharging this patient.       Josh Greenberg MD  [address] [address]  Phone: [phone]  Fax: [phone]  ______________________________________________________________________    Physical Exam   Vital Signs: Temp: 98.4  F (36.9  C) Temp src: Oral BP: 108/61 Pulse: 75   Resp: 18 SpO2: 90 % O2 Device: Nasal cannula Oxygen Delivery: 1 LPM  Weight: 279 lbs 12.8 oz  Constitutional: awake, alert, cooperative, no apparent distress, and appears stated age  Eyes: Lids and lashes normal, pupils equal, round  Respiratory: No increased work of breathing, good air exchange, clear to auscultation bilaterally, no crackles or wheezing  Cardiovascular: Normal apical impulse, regular rate and rhythm, normal S1 and S2, no S3 or S4, and no murmur noted  GI: Morbidly obese no scars, normal bowel sounds, soft, non-distended, non-tender, no masses palpated, no hepatosplenomegally  Skin: no bruising or bleeding and normal skin color, texture, turgor  Musculoskeletal: There is no redness, warmth, or swelling of the joints.  Full range of motion noted.  Bilateral +1 lower extremity pitting edema present  Neurologic: Awake, alert, oriented to name, place and time.  Cranial nerves II-XII are grossly intact.  Motor is 5 out of 5 bilaterally.   Sensory is intact.    Neuropsychiatric: Appropriate with examiner       Primary Care Physician   Michell Lloyd    Discharge Orders      CPAP Order for DME - ONLY FOR DME    If providing a ResMed device for this [place] patient, please add [place] [facility] as an Integrator in [facility] along with patient's MRN: [id number] and CSN: [id number].     Basic metabolic panel     Adult Sleep Eval & Management  Referral      Follow-Up with Cardiology ROD      Follow-Up with Cardiology       Discharge Instructions - IF on Metformin (Glucophage or Glucovance) or Metformin containing medications on day of the procedure and for 48 hours after IV contrast given- Patients with acute kidney injury or severe chronic kidney disease (stage IV or stage V; i.e., eGFR less than 30)    IF on Metformin (Glucophage or Glucovance) or Metformin containing medications , schedule a Basic Metabolic Panel at [facility] or Primary Clinic in 48 - 72 hours post procedure and PRIOR TO resuming the Metformin or Metformin containing medications.  Hold Metformin (Glucophage or Glucovance) or Metformin containing medications until after the Basic Metabolic Panel on the 2nd or 3rd day following the procedure.  May resume after blood draw is complete.     Reason for your hospital stay    Coronary angiogram     Follow-up and recommended labs and tests     See AVS for recommended follow up appointments     Activity    Please see AVS for a summary of discharge activity and restrictions     Oxygen Order    =    Oxygen Documentation  I certify that this patient, Sammy Laws has been under my care (or a nurse practitioner or physican's assistant working with me). This is the face-to-face encounter for oxygen medical necessity.      At the time of this encounter supplemental oxygen is reasonable and necessary and is expected to improve the patient's condition in a home setting.       Patient has continued oxygen desaturation due to Chronic Heart Failure I50.    If portability is ordered, is the patient mobile within the home? yes     Diet    Resume previous home diet       Significant Results and Procedures   Most Recent 3 CBC's:  Recent Labs   Lab Test 12/19/23  0440 12/18/23  0609 12/17/23  0413 12/15/23  0344   WBC 9.4  --  9.6 9.8   HGB 17.5*  --  17.5* 18.0*   MCV 83  --  83 84    171 150 130*     Most Recent 3 BMP's:  Recent Labs   Lab Test 12/19/23  0902 12/19/23  0440 12/18/23  2157 12/18/23  1706 12/18/23  1245  12/18/23  0833 12/18/23  0609 12/17/23  0753 12/17/23  0413   NA  --  132*  --   --   --   --  135  --  132*   POTASSIUM  --  3.5  --   --  3.9  --  3.1*   < > 3.2*   CHLORIDE  --  93*  --   --   --   --  92*  --  87*   CO2  --  34*  --   --   --   --  37*  --  40*   BUN  --  33.8*  --   --   --   --  46.0*  --  55.5*   CR  --  0.59  --   --   --   --  0.62  --  0.79   ANIONGAP  --  5*  --   --   --   --  6*  --  5*   IGNACIO  --  11.3*  --   --   --   --  11.3*  --  11.7*   * 118* 130*   < >  --    < > 111*   < > 121*    < > = values in this interval not displayed.   ,   Results for orders placed or performed during the hospital encounter of 12/08/23   XR Chest Port 1 View    Narrative    EXAM: XR CHEST PORT 1 VIEW  LOCATION: [facility]  DATE: 12/8/2023    INDICATION: Shortness of breath.  COMPARISON: 11/01/2023      Impression    IMPRESSION:     Diffuse interstitial prominence with hazy perihilar and lower lung opacities, likely mild interstitial and alveolar pulmonary edema.    No pleural effusion or pneumothorax.    Stable cardiomegaly.   XR Chest 1 View    Narrative    EXAM: XR CHEST 1 VIEW  LOCATION: [facility]  DATE: 12/9/2023    INDICATION: PICC, V tach  COMPARISON: 12/08/2023      Impression    IMPRESSION: Right-sided PICC line with tip in the distal SVC. Stable cardiomegaly with unchanged mediastinal contours. Pulmonary vascular congestion with mild interstitial edema persists. Increased opacity in the left lower lobe due to atelectasis versus   edema with likely a small left pleural effusion. No pneumothorax.   XR Chest 1 View    Narrative    EXAM: XR CHEST 1 VIEW  LOCATION: [facility]  DATE: 12/15/2023    INDICATION: Increase O2 requirements. Follow up lung infiltrates  COMPARISON: 12/09/2023      Impression    IMPRESSION: Right upper extremity PICC line catheter is in good position. Better inspiration. Bibasilar atelectasis,  slightly improved on the left. Less pulmonary vascular congestion. Heart is normal size.   Echocardiogram Limited     Value    LVEF  60-65%    [facility]    [id number]  [id number]  [id number]  [id number]^WILL^DANA^[place] [address]     Name: SHELTON WEBER  MRN: [id number]  : 1970  Study Date: 2023 10:46 AM  Age: 53 yrs  Gender: Female  Patient Location: [facility]  Reason For Study: CHF  Ordering Physician: DANA SOLIS  Referring Physician: KATHRINE REECE  Performed By: PORTER     BSA: 2.3 m2  Height: 63 in  Weight: 306 lb  HR: 99  BP: 113/62 mmHg  ______________________________________________________________________________  Procedure  Limited Portable Echo Adult. Definity (NDC #22916-959) given intravenously.  Lot [id number], Exp 1 Dec 24. Technically difficult study. Poor acoustic windows.  ______________________________________________________________________________  Interpretation Summary     Left ventricular size, wall motion and function are normal. The ejection  fraction is 60-65%.  Normal right ventricle size and systolic function.  On limited evaluation, no significant valve disease is identified.  Compared to the prior study the left ventricle now appears normal in function.  ______________________________________________________________________________  Left Ventricle  Left ventricular size, wall motion and function are normal. The ejection  fraction is 60-65%.     Right Ventricle  Normal right ventricle size and systolic function.     Mitral Valve  Mitral valve leaflets appear normal. There is no evidence of mitral stenosis  or clinically significant mitral regurgitation.     Tricuspid Valve  The tricuspid valve is not well visualized, but is grossly normal. Right  ventricular systolic pressure could not be approximated due to inadequate  tricuspid regurgitation.     Aortic Valve  Aortic valve leaflets appear normal. There is no evidence of  aortic stenosis  or clinically significant aortic regurgitation.     Vessels  The inferior vena cava cannot be assessed.     Pericardium  There is no pericardial effusion.     Rhythm  Sinus rhythm was noted.     ______________________________________________________________________________  MMode/2D Measurements & Calculations  IVSd: 1.2 cm  LVIDd: 5.1 cm  LVIDs: 3.2 cm  LVPWd: 1.0 cm  FS: 38.0 %  LV mass(C)d: 217.9 grams  LV mass(C)dI: 94.0 grams/m2  LA dimension: 4.0 cm  RWT: 0.40     Time Measurements  MM HR: 99.0 BPM     ______________________________________________________________________________  Report approved by: Meredith Whitman 2023 02:19 PM         Echocardiogram Complete     Value    LVEF  50-55% (borderline)    Narrative    [id number]  [id number]  [id number]  [id number]^ANTHONY^OANH^[place] [address]     Name: SHELTON WEBER  MRN: [id number]  : 1970  Study Date: 2023 01:48 PM  Age: 53 yrs  Gender: Female  Patient Location: [facility]  Reason For Study: Heart Failure  Ordering Physician: OANH CRUZ  Referring Physician: KATHRINE REECE  Performed By: ENZO     BSA: 2.3 m2  Height: 63 in  Weight: 290 lb  HR: 93  ______________________________________________________________________________  Procedure  Complete Echo Adult. Definity (NDC #99600-199) given intravenously.  ______________________________________________________________________________  Interpretation Summary     Left ventricular function is decreased. The ejection fraction is 50-55%  (borderline).  The right ventricle is not well visualized.  The right ventricle is mildly dilated.  Mildly decreased right ventricular systolic function  Small pericardial effusion  There are no echocardiographic indications of cardiac tamponade.  Technically difficult, suboptimal study.  ______________________________________________________________________________  Left  Ventricle  The left ventricle is normal in size. Left ventricular function is decreased.  The ejection fraction is 50-55% (borderline). There is mild concentric left  ventricular hypertrophy.     Right Ventricle  The right ventricle is not well visualized. The right ventricle is mildly  dilated. Mildly decreased right ventricular systolic function.     Atria  The left atrium is mildly dilated. Right atrium not well visualized.     Mitral Valve  Mitral valve leaflets appear normal. There is no evidence of mitral stenosis  or clinically significant mitral regurgitation.     Tricuspid Valve  Tricuspid valve leaflets appear normal. There is no evidence of tricuspid  stenosis or clinically significant tricuspid regurgitation. Right ventricular  systolic pressure could not be approximated due to inadequate tricuspid  regurgitation.     Aortic Valve  Aortic valve leaflets appear normal. There is no evidence of aortic stenosis  or clinically significant aortic regurgitation.     Pulmonic Valve  The pulmonic valve is not well visualized.     Vessels  Normal size ascending aorta. Inferior vena cava not well visualized for  estimation of right atrial pressure.     Pericardium  Small pericardial effusion. There are no echocardiographic indications of  cardiac tamponade.     ______________________________________________________________________________  MMode/2D Measurements & Calculations  IVSd: 1.2 cm  LVIDd: 5.3 cm  LVIDs: 3.5 cm  LVPWd: 1.1 cm  FS: 34.0 %     LV mass(C)d: 250.7 grams  LV mass(C)dI: 110.7 grams/m2  Ao root diam: 2.9 cm  LA dimension: 4.8 cm  asc Aorta Diam: 3.2 cm  LA/Ao: 1.7  LVOT diam: 2.1 cm  LVOT area: 3.5 cm2  Ao root diam index Ht(cm/m): 1.8  Ao root diam index BSA (cm/m2): 1.3  Asc Ao diam index BSA (cm/m2): 1.4  Asc Ao diam index Ht(cm/m): 2.0  LA Volume (BP): 58.5 ml  LA Volume Indexed (AL/bp): 27.2 ml/m2     RV Base: 5.0 cm  RWT: 0.42  TAPSE: 2.2 cm     Time Measurements  MM HR: 92.0 BPM     Doppler  "Measurements & Calculations  MV E max esdras: 80.2 cm/sec  MV A max esdras: 99.0 cm/sec  MV E/A: 0.81  MV dec slope: 584.0 cm/sec2  MV dec time: 0.14 sec  Ao V2 max: 173.0 cm/sec  Ao max P.0 mmHg  Ao V2 mean: 134.0 cm/sec  Ao mean P.0 mmHg  Ao V2 VTI: 30.6 cm  MOSES(I,D): 1.9 cm2  MOSES(V,D): 2.0 cm2  LV V1 max P.0 mmHg  LV V1 max: 99.8 cm/sec  LV V1 VTI: 16.9 cm  SV(LVOT): 58.5 ml  SI(LVOT): 25.8 ml/m2  PA acc time: 0.10 sec  AV Esdras Ratio (DI): 0.58  MOSES Index (cm2/m2): 0.84     E/E' av.7  Lateral E/e': 8.4  Medial E/e': 15.0  RV S Esdras: 10.8 cm/sec     ______________________________________________________________________________  Report approved by: Meredith Yin 2023 02:34 PM         Cardiac Catheterization    Narrative    Multivessel CAD as described below:  LM: Normal  LAD: Diffuse mild disease with a focal moderate lesion in the distal LAD.    DFR is positive (0.84) beyond the distal LAD lesion, but DFR pullback   shows gradual change in DFR, with no focal \"step-up\" to suggest a benefit   of PCI.  LCx: Codominant vessel with severe stenosis downstream in the L PDA.  This   is a relatively small vessel at this point (roughly 2 mm in diameter).  RCA: Codominant vessel with chronic subtotal occlusion in the mid vessel,   with ALVINA I flow distally.  Severe WHO II (postcapillary) pulmonary hypertension.  Severely elevated pulmonary capillary wedge pressure (~45 mmHg).  Normal cardiac index by Tiana.      Would recommend focus currently on diuresis/afterload reduction to improve   pulmonary hypertension and LV filling pressures.  We will give 40 mg of IV   Lasix today, then change her home diuretic from furosemide 20 mg oral   daily to torsemide 40 mg oral daily, with plan for repeat labs in roughly   3 days and dose adjustment as needed based on this.    Following improvement of her hemodynamics/volume status, could consider   return to the Cath Lab for PCI of the LPDA and mid RCA versus medical "   management.         Discharge Medications   Current Discharge Medication List        START taking these medications    Details   albuterol (PROAIR HFA/PROVENTIL HFA/VENTOLIN HFA) 108 (90 Base) MCG/ACT inhaler Inhale 2 puffs into the lungs every 6 hours as needed for shortness of breath, wheezing or cough  Qty: 18 g, Refills: 3    Comments: Pharmacy may dispense brand covered by insurance (Proair, or proventil or ventolin or generic albuterol inhaler)  Associated Diagnoses: SOB (shortness of breath)      clopidogrel (PLAVIX) 75 MG tablet Take 1 tablet (75 mg) by mouth daily for 90 days  Qty: 90 tablet, Refills: 0    Associated Diagnoses: Congestive heart failure, unspecified HF chronicity, unspecified heart failure type (H)      famotidine (PEPCID) 20 MG tablet Take 1 tablet (20 mg) by mouth at bedtime  Qty: 90 tablet, Refills: 0    Associated Diagnoses: Gastroesophageal reflux disease without esophagitis      rosuvastatin (CRESTOR) 40 MG tablet Take 1 tablet (40 mg) by mouth daily for 90 days  Qty: 90 tablet, Refills: 0    Associated Diagnoses: Congestive heart failure, unspecified HF chronicity, unspecified heart failure type (H)      spironolactone (ALDACTONE) 25 MG tablet Take 0.5 tablets (12.5 mg) by mouth 2 times daily for 90 days  Qty: 90 tablet, Refills: 0    Associated Diagnoses: Congestive heart failure, unspecified HF chronicity, unspecified heart failure type (H)      torsemide (DEMADEX) 20 MG tablet Take 2 tablets (40 mg) once daily on 12/9, 12/10, 12/11, and 12/12.  Get your labs drawn as ordered on either 12/11 or 12/12.  You will then be contacted with recommendations for what dose to take after this.  If you do not hear from our clinic by 12/13, you should plan on reducing your dose down to 1 tablet (20 mg) daily on 12/13.  Qty: 90 tablet, Refills: 3    Associated Diagnoses: Congestive heart failure, unspecified HF chronicity, unspecified heart failure type (H)           CONTINUE these medications  which have CHANGED    Details   losartan (COZAAR) 50 MG tablet Take 0.5 tablets (25 mg) by mouth daily  Qty: 90 tablet, Refills: 3    Associated Diagnoses: SOB (shortness of breath); Nonspecific abnormal electrocardiogram (ECG) (EKG); Morbid obesity (H); Hypertension, unspecified type; Atherosclerosis of aorta (H24)           CONTINUE these medications which have NOT CHANGED    Details   aspirin 81 MG EC tablet Take 1 tablet (81 mg) by mouth daily  Qty: 100 tablet, Refills: 4    Associated Diagnoses: Type 2 diabetes mellitus with other circulatory complication, without long-term current use of insulin (H)      metFORMIN (GLUCOPHAGE XR) 500 MG 24 hr tablet Take 1 tablet (500 mg) by mouth 2 times daily (with meals) for 90 days  Qty: 180 tablet, Refills: 0    Associated Diagnoses: Type 2 diabetes mellitus with other circulatory complication, without long-term current use of insulin (H)      buPROPion (WELLBUTRIN XL) 150 MG 24 hr tablet TAKE 1 TABLET(150 MG) BY MOUTH EVERY MORNING  Qty: 90 tablet, Refills: 2    Associated Diagnoses: Encounter for tobacco use cessation counseling           STOP taking these medications       furosemide (LASIX) 20 MG tablet Comments:   Reason for Stopping:             Allergies   No Known Allergies

## 2023-12-19 NOTE — PROGRESS NOTES
Provided [facility] DME number to patient's RN.  The point of contact is Jacqui at [phone] to setup BIPAP at home.    Marcos Medley, RT  12/19/2023

## 2023-12-19 NOTE — PLAN OF CARE
Problem: Pulmonary Impairment  Goal: Improved Activity Tolerance  Outcome: Progressing     Problem: Pulmonary Impairment  Goal: Effective Airway Clearance  Outcome: Progressing     Problem: Pulmonary Impairment  Goal: Optimal Gas Exchange  Outcome: Progressing     Problem: Comorbidity Management  Goal: Blood Glucose Levels Within Targeted Range  Outcome: Progressing     Problem: Comorbidity Management  Goal: Maintenance of Heart Failure Symptom Control  Outcome: Progressing     Problem: Comorbidity Management  Goal: Blood Pressure in Desired Range  Outcome: Progressing   Goal Outcome Evaluation:    Patient slept between cares. Vitals stable on 1L oxygen through nasal canula.  Denies pain. Used BiPAP when asleep until 0600. Oxygen sats in low 90s. Encouraged to use Incentive spirometer when awake. Up to the bathroom with stand by assist.   Cardiac rhythm: Normal Sinus Rhythm.

## 2023-12-21 ENCOUNTER — PATIENT OUTREACH (OUTPATIENT)
Dept: CARE COORDINATION | Facility: CLINIC | Age: 53
End: 2023-12-21
Payer: COMMERCIAL

## 2023-12-21 NOTE — PROGRESS NOTES
"Clinic Care Coordination Contact  [facility]: Post-Discharge Note  SITUATION                                                      Admission:    Admission Date: 12/08/23   Reason for Admission: CHF (congestive heart failure)  Discharge:   Discharge Date: 12/19/23  Discharge Diagnosis: CHF (congestive heart failure)    BACKGROUND                                                      Per hospital discharge summary and inpatient provider notes:    53year old female with morbid obesity, cad, dm came in for elective cardiac angiogram. During angiogram, was found to have severely elevated pulmonary wedge pressures, decompensated CHF.  Admitted to the ICU on BiPAP, ultimately required Bumex drip for diuresis.  downgraded from ICU 12/10. Ongoing cardiology workup and medication titration. Ideally would not discharge until home AVAPS arranged, was hypoxic and requires 50 L, now is improving and down to 2 L   ASSESSMENT           Discharge Assessment  How are you doing now that you are home?: \" Doing well but my C-pap machine keeps saying mask isnt good\"  How are your symptoms? (Red Flag symptoms escalate to triage hotline per guidelines): Improved  Do you feel your condition is stable enough to be safe at home until your provider visit?: Yes  Does the patient have their discharge instructions? : Yes  Does the patient have questions regarding their discharge instructions? : Yes (see comment)  Were you started on any new medications or were there changes to any of your previous medications? : Yes  Does the patient have all of their medications?: Yes  Do you have questions regarding any of your medications? : No  Do you have all of your needed medical supplies or equipment (DME)?  (i.e. oxygen tank, CPAP, cane, etc.): Yes  Discharge follow-up appointment scheduled within 14 calendar days? : Yes  Discharge Follow Up Appointment Date: 12/27/23  Discharge Follow Up Appointment Scheduled with?: Specialty Care " Provider    Post-op (W CTA Only)  If the patient had a surgery or procedure, do they have any questions for a nurse?: Yes (see comment)             PLAN                                                      Outpatient Plan:    IF on Metformin (Glucophage or Glucovance) or Metformin containing medications , schedule a Basic Metabolic Panel at [facility] or Primary Clinic in 48 - 72 hours post procedure and PRIOR TO resuming the Metformin or Metformin containing medications.  Hold Metformin (Glucophage or Glucovance) or Metformin containing medications until after the Basic Metabolic Panel on the 2nd or 3rd day following the procedure.  May resume after blood draw is complete.          Reason for your hospital stay     Coronary angiogram          Follow-up and recommended labs and tests      See AVS for recommended follow up appointments          Activity     Please see AVS for a summary of discharge activity and restrictions          Oxygen Order     =     Oxygen Documentation  I certify that this patient, Sammy Laws has been under my care (or a nurse practitioner or physican's assistant working with me). This is the face-to-face encounter for oxygen medical necessity.       At the time of this encounter supplemental oxygen is reasonable and necessary and is expected to improve the patient's condition in a home setting.        Patient has continued oxygen desaturation due to Chronic Heart Failure I50.     If portability is ordered, is the patient mobile within the home? yes          Diet     Resume previous home diet       Future Appointments   Date Time Provider Department Center   12/27/2023 12:50 PM [facility]   12/27/2023  1:30 PM [facility]   1/2/2024  3:20 PM Abdelrahman Miranda MD [facility]         For any urgent concerns, please contact our 24 hour nurse triage line: [phone] ([phone])         Peggy Yost MA

## 2023-12-26 PROBLEM — I50.33 ACUTE ON CHRONIC DIASTOLIC HEART FAILURE (H): Status: ACTIVE | Noted: 2023-12-26

## 2023-12-26 PROBLEM — I10 HYPERTENSION, UNSPECIFIED TYPE: Status: RESOLVED | Noted: 2023-11-01 | Resolved: 2023-12-26

## 2023-12-26 PROBLEM — I50.9 CHF (CONGESTIVE HEART FAILURE) (H): Status: RESOLVED | Noted: 2023-12-08 | Resolved: 2023-12-26

## 2024-01-02 ENCOUNTER — OFFICE VISIT (OUTPATIENT)
Dept: CARDIOLOGY | Facility: CLINIC | Age: 54
End: 2024-01-02
Attending: INTERNAL MEDICINE
Payer: COMMERCIAL

## 2024-01-02 VITALS
RESPIRATION RATE: 16 BRPM | SYSTOLIC BLOOD PRESSURE: 122 MMHG | BODY MASS INDEX: 51.31 KG/M2 | HEIGHT: 63 IN | DIASTOLIC BLOOD PRESSURE: 74 MMHG | HEART RATE: 92 BPM | WEIGHT: 289.6 LBS

## 2024-01-02 DIAGNOSIS — I50.9 CONGESTIVE HEART FAILURE, UNSPECIFIED HF CHRONICITY, UNSPECIFIED HEART FAILURE TYPE (H): ICD-10-CM

## 2024-01-02 DIAGNOSIS — R94.31 NONSPECIFIC ABNORMAL ELECTROCARDIOGRAM (ECG) (EKG): ICD-10-CM

## 2024-01-02 DIAGNOSIS — R06.02 SOB (SHORTNESS OF BREATH): ICD-10-CM

## 2024-01-02 DIAGNOSIS — I25.10 CORONARY ARTERY DISEASE INVOLVING NATIVE CORONARY ARTERY OF NATIVE HEART WITHOUT ANGINA PECTORIS: ICD-10-CM

## 2024-01-02 DIAGNOSIS — I70.0 ATHEROSCLEROSIS OF AORTA (H): ICD-10-CM

## 2024-01-02 DIAGNOSIS — I50.33 ACUTE ON CHRONIC DIASTOLIC HEART FAILURE (H): Primary | ICD-10-CM

## 2024-01-02 DIAGNOSIS — I27.20 PULMONARY HYPERTENSION (H): ICD-10-CM

## 2024-01-02 DIAGNOSIS — E66.01 MORBID OBESITY (H): ICD-10-CM

## 2024-01-02 DIAGNOSIS — E83.52 SERUM CALCIUM ELEVATED: ICD-10-CM

## 2024-01-02 DIAGNOSIS — I10 HYPERTENSION, UNSPECIFIED TYPE: ICD-10-CM

## 2024-01-02 LAB
ALBUMIN SERPL BCG-MCNC: 4.1 G/DL (ref 3.5–5.2)
ALP SERPL-CCNC: 109 U/L (ref 40–150)
ALT SERPL W P-5'-P-CCNC: 90 U/L (ref 0–50)
ANION GAP SERPL CALCULATED.3IONS-SCNC: 8 MMOL/L (ref 7–15)
AST SERPL W P-5'-P-CCNC: 58 U/L (ref 0–45)
BILIRUB SERPL-MCNC: 0.7 MG/DL
BUN SERPL-MCNC: 11.8 MG/DL (ref 6–20)
CALCIUM SERPL-MCNC: 11.5 MG/DL (ref 8.6–10)
CHLORIDE SERPL-SCNC: 98 MMOL/L (ref 98–107)
CREAT SERPL-MCNC: 0.57 MG/DL (ref 0.51–0.95)
DEPRECATED HCO3 PLAS-SCNC: 28 MMOL/L (ref 22–29)
EGFRCR SERPLBLD CKD-EPI 2021: >90 ML/MIN/1.73M2
ERYTHROCYTE [DISTWIDTH] IN BLOOD BY AUTOMATED COUNT: 20.1 % (ref 10–15)
GLUCOSE SERPL-MCNC: 107 MG/DL (ref 70–99)
HBA1C MFR BLD: 6.7 % (ref 0–5.6)
HCT VFR BLD AUTO: >60 % (ref 35–47)
HGB BLD-MCNC: 17.8 G/DL (ref 11.7–15.7)
MAGNESIUM SERPL-MCNC: 1.9 MG/DL (ref 1.7–2.3)
MCH RBC QN AUTO: 24.3 PG (ref 26.5–33)
MCHC RBC AUTO-ENTMCNC: 29.3 G/DL (ref 31.5–36.5)
MCV RBC AUTO: 83 FL (ref 78–100)
NT-PROBNP SERPL-MCNC: 92 PG/ML (ref 0–900)
PLATELET # BLD AUTO: 210 10E3/UL (ref 150–450)
POTASSIUM SERPL-SCNC: 4.5 MMOL/L (ref 3.4–5.3)
PROT SERPL-MCNC: 7.3 G/DL (ref 6.4–8.3)
RBC # BLD AUTO: 7.32 10E6/UL (ref 3.8–5.2)
SODIUM SERPL-SCNC: 134 MMOL/L (ref 135–145)
WBC # BLD AUTO: 8.7 10E3/UL (ref 4–11)

## 2024-01-02 PROCEDURE — 99214 OFFICE O/P EST MOD 30 MIN: CPT | Mod: 25 | Performed by: INTERNAL MEDICINE

## 2024-01-02 PROCEDURE — 83735 ASSAY OF MAGNESIUM: CPT | Performed by: INTERNAL MEDICINE

## 2024-01-02 PROCEDURE — 83880 ASSAY OF NATRIURETIC PEPTIDE: CPT | Performed by: INTERNAL MEDICINE

## 2024-01-02 PROCEDURE — 36415 COLL VENOUS BLD VENIPUNCTURE: CPT | Performed by: INTERNAL MEDICINE

## 2024-01-02 PROCEDURE — 85027 COMPLETE CBC AUTOMATED: CPT | Performed by: INTERNAL MEDICINE

## 2024-01-02 PROCEDURE — 80053 COMPREHEN METABOLIC PANEL: CPT | Performed by: INTERNAL MEDICINE

## 2024-01-02 PROCEDURE — 83036 HEMOGLOBIN GLYCOSYLATED A1C: CPT | Performed by: INTERNAL MEDICINE

## 2024-01-02 RX ORDER — LOSARTAN POTASSIUM 25 MG/1
25 TABLET ORAL DAILY
COMMUNITY
Start: 2023-12-19 | End: 2024-02-08

## 2024-01-02 RX ORDER — NICOTINE 21 MG/24HR
1 PATCH, TRANSDERMAL 24 HOURS TRANSDERMAL EVERY 24 HOURS
Qty: 60 PATCH | Refills: 1 | Status: SHIPPED | OUTPATIENT
Start: 2024-01-02

## 2024-01-02 NOTE — PROGRESS NOTES
[facility]  [phone]          Assessment/Recommendations   Patient with recent hospitalization for coronary angiogram, heart catheterization, marked fluid overload.  She does have coronary artery disease but it was decided not to do any percutaneous intervention as the more significant lesions were distally and in small vessels.  She did also did not have angina.    She lost more than 40 pounds of water in the hospital and does feel significantly improved.  She is breathing better and uses a BiPAP like device at home but has not had a follow-up with pulmonary and we will get that set up in the near future.  She currently does not have an appointment so I have put in a new consult.    She is not sure the dose of her medications at this time and she will call us with her home med list which we can clean up on her chart and decide about other therapy.  She does not have evidence of pulmonary vascular congestion today and overall is doing better so my inclination would be not to change her medications today but to see her back in 1 month and start to titrate her on guideline based therapy.  She is on spironolactone, and may benefit from Jardiance.    Further recommendations pending lab results, pulmonary recommendations and clinical course.  She will need careful follow-up at least initially.  She did miss her an appointment with Yusra and we will want to impress upon her the importance of following up.    She started smoking about 2 cigarettes a day and requested the nicotine patch which I provided today.    At some point in the next few months, we will likely want to repeat an echocardiogram as I think that imaging will be significantly improved.     History of Present Illness/Subjective    Ms. Sammy Laws is a 53 year old female with known recent hospitalization for severe pulmonary vascular overload, hypoventilation, probable sleep apnea requiring BiPAP initially, diuresed more than  "40 pounds in the hospital.  She had coronary artery disease and as noted above percutaneous intervention was not performed.    She has been feeling quite a bit better and able to ambulate with improved breathing, much improved sleeping.  She is using a CPAP or BiPAP device at night.  She has not had any chest discomfort, syncope or near syncopal episodes and her peripheral edema is markedly improved if not gone at times.  No palpitations noted.    She had normal left ventricular systolic function by echocardiography.  Probable mild right ventricular enlargement.  No significant valvular heart disease.    ECG: Personally reviewed.        Physical Examination Review of Systems   /74 (BP Location: Right arm, Patient Position: Sitting, Cuff Size: Adult Large)   Pulse 92   Resp 16   Ht 1.6 m (5' 3\")   Wt 131.4 kg (289 lb 9.6 oz)   BMI 51.30 kg/m    Body mass index is 51.3 kg/m .  Wt Readings from Last 3 Encounters:   01/02/24 131.4 kg (289 lb 9.6 oz)   12/18/23 126.9 kg (279 lb 12.8 oz)   11/21/23 (!) 151.5 kg (334 lb 1.6 oz)     General Appearance:   Alert, cooperative and in no acute distress.   ENT/Mouth: Pink/moist oral mucosa   EYES:  no scleral icterus, normal conjunctivae   Neck: JVP normal. No Hepatojugular reflux. Thyroid not visualized.   Chest/Lungs:   Lungs are clear to auscultation, equal chest wall expansion.   Cardiovascular:   S1, S2 with 1/6 systolic murmur , no clicks or rubs. Brachial, radial pulses are intact and symetric. No carotid bruits noted   Abdomen:  Nontender. BS+.    Extremities: No cyanosis, clubbing and minimal pretibial edema   Skin: no xanthelasma, warm.    Neurologic: normal arm movement bilateral, no tremors     Psychiatric: Appropriate affect.      Enc Vitals  BP: 122/74  Pulse: 92  Resp: 16  Weight: 131.4 kg (289 lb 9.6 oz)  Height: 160 cm (5' 3\")                                           Medical History  Surgical History Family History Social History   Past Medical " History:   Diagnosis Date    CHF (congestive heart failure) (H)     DMII (diabetes mellitus, type 2) (H)     Essential hypertension     Morbid obesity (H)     Pulmonary hypertension (H)     Past Surgical History:   Procedure Laterality Date    CV CORONARY ANGIOGRAM N/A 12/8/2023    Procedure: Coronary Angiogram;  Surgeon: Abhi Valiente MD;  Location: [facility] CATH LAB CV    CV INSTANTANEOUS WAVE-FREE RATIO N/A 12/8/2023    Procedure: Instantaneous Wave-Free Ratio;  Surgeon: Abhi Valiente MD;  Location: [facility] CATH LAB CV    CV RIGHT HEART CATH MEASUREMENTS RECORDED N/A 12/8/2023    Procedure: Right Heart Catheterization;  Surgeon: Abhi Valiente MD;  Location: [facility] CATH LAB CV    PICC TRIPLE LUMEN PLACEMENT  12/9/2023    No family history on file. Social History     Socioeconomic History    Marital status: Single     Spouse name: Not on file    Number of children: Not on file    Years of education: Not on file    Highest education level: Not on file   Occupational History    Not on file   Tobacco Use    Smoking status: Every Day     Packs/day: 1     Types: Cigarettes     Passive exposure: Current    Smokeless tobacco: Never   Vaping Use    Vaping Use: Never used   Substance and Sexual Activity    Alcohol use: Not on file    Drug use: Not on file    Sexual activity: Not on file   Other Topics Concern    Not on file   Social History Narrative    Not on file     Social Determinants of Health     Financial Resource Strain: Low Risk  (11/1/2023)    Financial Resource Strain     Within the past 12 months, have you or your family members you live with been unable to get utilities (heat, electricity) when it was really needed?: No   Food Insecurity: Low Risk  (11/1/2023)    Food Insecurity     Within the past 12 months, did you worry that your food would run out before you got money to buy more?: No     Within the past 12 months, did the food you bought just not last and you didn t have  money to get more?: No   Transportation Needs: Low Risk  (11/1/2023)    Transportation Needs     Within the past 12 months, has lack of transportation kept you from medical appointments, getting your medicines, non-medical meetings or appointments, work, or from getting things that you need?: No   Physical Activity: Not on file   Stress: Not on file   Social Connections: Not on file   Interpersonal Safety: Not on file   Housing Stability: Low Risk  (11/1/2023)    Housing Stability     Do you have housing? : Yes     Are you worried about losing your housing?: No          Medications  Allergies   Current Outpatient Medications   Medication Sig Dispense Refill    albuterol (PROAIR HFA/PROVENTIL HFA/VENTOLIN HFA) 108 (90 Base) MCG/ACT inhaler Inhale 2 puffs into the lungs every 6 hours as needed for shortness of breath, wheezing or cough 18 g 3    aspirin 81 MG EC tablet Take 1 tablet (81 mg) by mouth daily 100 tablet 4    buPROPion (WELLBUTRIN XL) 150 MG 24 hr tablet TAKE 1 TABLET(150 MG) BY MOUTH EVERY MORNING 90 tablet 2    clopidogrel (PLAVIX) 75 MG tablet Take 1 tablet (75 mg) by mouth daily for 90 days 90 tablet 0    losartan (COZAAR) 25 MG tablet Take 25 mg by mouth daily      nicotine (NICODERM CQ) 14 MG/24HR 24 hr patch Place 1 patch onto the skin every 24 hours 60 patch 1    rosuvastatin (CRESTOR) 40 MG tablet Take 1 tablet (40 mg) by mouth daily for 90 days 90 tablet 0    spironolactone (ALDACTONE) 25 MG tablet Take 0.5 tablets (12.5 mg) by mouth 2 times daily for 90 days 90 tablet 0    torsemide (DEMADEX) 20 MG tablet Take 2 tablets (40 mg) once daily on 12/9, 12/10, 12/11, and 12/12.  Get your labs drawn as ordered on either 12/11 or 12/12.  You will then be contacted with recommendations for what dose to take after this.  If you do not hear from our clinic by 12/13, you should plan on reducing your dose down to 1 tablet (20 mg) daily on 12/13. 90 tablet 3    metFORMIN (GLUCOPHAGE XR) 500 MG 24 hr tablet  Take 1 tablet (500 mg) by mouth 2 times daily (with meals) for 90 days (Patient not taking: Reported on 1/2/2024) 180 tablet 0    No Known Allergies      Lab Results    Chemistry/lipid CBC Cardiac Enzymes/BNP/TSH/INR   Lab Results   Component Value Date    CHOL 149 12/08/2023    HDL 34 (L) 12/08/2023    TRIG 108 12/08/2023    BUN 33.8 (H) 12/19/2023     (L) 12/19/2023    CO2 34 (H) 12/19/2023    Lab Results   Component Value Date    WBC 9.4 12/19/2023    HGB 17.5 (H) 12/19/2023    HCT 60.0 (H) 12/19/2023    MCV 83 12/19/2023     12/19/2023    Lab Results   Component Value Date    TSH 1.47 12/08/2023

## 2024-01-02 NOTE — LETTER
1/2/2024    Michell Lloyd NP  [address] [address]    RE: Sammy Laws       Dear Colleague,     I had the pleasure of seeing Sammy Laws in the [facility].      [facility]  [phone]          Assessment/Recommendations   Patient with recent hospitalization for coronary angiogram, heart catheterization, marked fluid overload.  She does have coronary artery disease but it was decided not to do any percutaneous intervention as the more significant lesions were distally and in small vessels.  She did also did not have angina.    She lost more than 40 pounds of water in the hospital and does feel significantly improved.  She is breathing better and uses a BiPAP like device at home but has not had a follow-up with pulmonary and we will get that set up in the near future.  She currently does not have an appointment so I have put in a new consult.    She is not sure the dose of her medications at this time and she will call us with her home med list which we can clean up on her chart and decide about other therapy.  She does not have evidence of pulmonary vascular congestion today and overall is doing better so my inclination would be not to change her medications today but to see her back in 1 month and start to titrate her on guideline based therapy.  She is on spironolactone, and may benefit from Jardiance.    Further recommendations pending lab results, pulmonary recommendations and clinical course.  She will need careful follow-up at least initially.  She did miss her an appointment with Yusra and we will want to impress upon her the importance of following up.    She started smoking about 2 cigarettes a day and requested the nicotine patch which I provided today.    At some point in the next few months, we will likely want to repeat an echocardiogram as I think that imaging will be significantly improved.     History of Present Illness/Subjective    Ms.  "Sammy Laws is a 53 year old female with known recent hospitalization for severe pulmonary vascular overload, hypoventilation, probable sleep apnea requiring BiPAP initially, diuresed more than 40 pounds in the hospital.  She had coronary artery disease and as noted above percutaneous intervention was not performed.    She has been feeling quite a bit better and able to ambulate with improved breathing, much improved sleeping.  She is using a CPAP or BiPAP device at night.  She has not had any chest discomfort, syncope or near syncopal episodes and her peripheral edema is markedly improved if not gone at times.  No palpitations noted.    She had normal left ventricular systolic function by echocardiography.  Probable mild right ventricular enlargement.  No significant valvular heart disease.    ECG: Personally reviewed.        Physical Examination Review of Systems   /74 (BP Location: Right arm, Patient Position: Sitting, Cuff Size: Adult Large)   Pulse 92   Resp 16   Ht 1.6 m (5' 3\")   Wt 131.4 kg (289 lb 9.6 oz)   BMI 51.30 kg/m    Body mass index is 51.3 kg/m .  Wt Readings from Last 3 Encounters:   01/02/24 131.4 kg (289 lb 9.6 oz)   12/18/23 126.9 kg (279 lb 12.8 oz)   11/21/23 (!) 151.5 kg (334 lb 1.6 oz)     General Appearance:   Alert, cooperative and in no acute distress.   ENT/Mouth: Pink/moist oral mucosa   EYES:  no scleral icterus, normal conjunctivae   Neck: JVP normal. No Hepatojugular reflux. Thyroid not visualized.   Chest/Lungs:   Lungs are clear to auscultation, equal chest wall expansion.   Cardiovascular:   S1, S2 with 1/6 systolic murmur , no clicks or rubs. Brachial, radial pulses are intact and symetric. No carotid bruits noted   Abdomen:  Nontender. BS+.    Extremities: No cyanosis, clubbing and minimal pretibial edema   Skin: no xanthelasma, warm.    Neurologic: normal arm movement bilateral, no tremors     Psychiatric: Appropriate affect.      Enc Vitals  BP: 122/74  Pulse: " "92  Resp: 16  Weight: 131.4 kg (289 lb 9.6 oz)  Height: 160 cm (5' 3\")                                           Medical History  Surgical History Family History Social History   Past Medical History:   Diagnosis Date    CHF (congestive heart failure) (H)     DMII (diabetes mellitus, type 2) (H)     Essential hypertension     Morbid obesity (H)     Pulmonary hypertension (H)     Past Surgical History:   Procedure Laterality Date    CV CORONARY ANGIOGRAM N/A 12/8/2023    Procedure: Coronary Angiogram;  Surgeon: Abhi Valiente MD;  Location: [facility] CATH LAB CV    CV INSTANTANEOUS WAVE-FREE RATIO N/A 12/8/2023    Procedure: Instantaneous Wave-Free Ratio;  Surgeon: Abhi Valiente MD;  Location: [facility] CATH LAB CV    CV RIGHT HEART CATH MEASUREMENTS RECORDED N/A 12/8/2023    Procedure: Right Heart Catheterization;  Surgeon: Abhi Valiente MD;  Location: [facility] CATH LAB CV    PICC TRIPLE LUMEN PLACEMENT  12/9/2023    No family history on file. Social History     Socioeconomic History    Marital status: Single     Spouse name: Not on file    Number of children: Not on file    Years of education: Not on file    Highest education level: Not on file   Occupational History    Not on file   Tobacco Use    Smoking status: Every Day     Packs/day: 1     Types: Cigarettes     Passive exposure: Current    Smokeless tobacco: Never   Vaping Use    Vaping Use: Never used   Substance and Sexual Activity    Alcohol use: Not on file    Drug use: Not on file    Sexual activity: Not on file   Other Topics Concern    Not on file   Social History Narrative    Not on file     Social Determinants of Health     Financial Resource Strain: Low Risk  (11/1/2023)    Financial Resource Strain     Within the past 12 months, have you or your family members you live with been unable to get utilities (heat, electricity) when it was really needed?: No   Food Insecurity: Low Risk  (11/1/2023)    Food Insecurity     Within " the past 12 months, did you worry that your food would run out before you got money to buy more?: No     Within the past 12 months, did the food you bought just not last and you didn t have money to get more?: No   Transportation Needs: Low Risk  (11/1/2023)    Transportation Needs     Within the past 12 months, has lack of transportation kept you from medical appointments, getting your medicines, non-medical meetings or appointments, work, or from getting things that you need?: No   Physical Activity: Not on file   Stress: Not on file   Social Connections: Not on file   Interpersonal Safety: Not on file   Housing Stability: Low Risk  (11/1/2023)    Housing Stability     Do you have housing? : Yes     Are you worried about losing your housing?: No          Medications  Allergies   Current Outpatient Medications   Medication Sig Dispense Refill    albuterol (PROAIR HFA/PROVENTIL HFA/VENTOLIN HFA) 108 (90 Base) MCG/ACT inhaler Inhale 2 puffs into the lungs every 6 hours as needed for shortness of breath, wheezing or cough 18 g 3    aspirin 81 MG EC tablet Take 1 tablet (81 mg) by mouth daily 100 tablet 4    buPROPion (WELLBUTRIN XL) 150 MG 24 hr tablet TAKE 1 TABLET(150 MG) BY MOUTH EVERY MORNING 90 tablet 2    clopidogrel (PLAVIX) 75 MG tablet Take 1 tablet (75 mg) by mouth daily for 90 days 90 tablet 0    losartan (COZAAR) 25 MG tablet Take 25 mg by mouth daily      nicotine (NICODERM CQ) 14 MG/24HR 24 hr patch Place 1 patch onto the skin every 24 hours 60 patch 1    rosuvastatin (CRESTOR) 40 MG tablet Take 1 tablet (40 mg) by mouth daily for 90 days 90 tablet 0    spironolactone (ALDACTONE) 25 MG tablet Take 0.5 tablets (12.5 mg) by mouth 2 times daily for 90 days 90 tablet 0    torsemide (DEMADEX) 20 MG tablet Take 2 tablets (40 mg) once daily on 12/9, 12/10, 12/11, and 12/12.  Get your labs drawn as ordered on either 12/11 or 12/12.  You will then be contacted with recommendations for what dose to take after  this.  If you do not hear from our clinic by 12/13, you should plan on reducing your dose down to 1 tablet (20 mg) daily on 12/13. 90 tablet 3    metFORMIN (GLUCOPHAGE XR) 500 MG 24 hr tablet Take 1 tablet (500 mg) by mouth 2 times daily (with meals) for 90 days (Patient not taking: Reported on 1/2/2024) 180 tablet 0    No Known Allergies      Lab Results    Chemistry/lipid CBC Cardiac Enzymes/BNP/TSH/INR   Lab Results   Component Value Date    CHOL 149 12/08/2023    HDL 34 (L) 12/08/2023    TRIG 108 12/08/2023    BUN 33.8 (H) 12/19/2023     (L) 12/19/2023    CO2 34 (H) 12/19/2023    Lab Results   Component Value Date    WBC 9.4 12/19/2023    HGB 17.5 (H) 12/19/2023    HCT 60.0 (H) 12/19/2023    MCV 83 12/19/2023     12/19/2023    Lab Results   Component Value Date    TSH 1.47 12/08/2023                Thank you for allowing me to participate in the care of your patient.      Sincerely,     Abdelrahman Miranda MD     [facility]  cc:   Abdelrahman Miranda MD  [address]  [place] [address]

## 2024-01-03 DIAGNOSIS — I50.33 ACUTE ON CHRONIC DIASTOLIC HEART FAILURE (H): Primary | ICD-10-CM

## 2024-01-04 ENCOUNTER — TELEPHONE (OUTPATIENT)
Dept: FAMILY MEDICINE | Facility: CLINIC | Age: 54
End: 2024-01-04
Payer: COMMERCIAL

## 2024-01-04 NOTE — PROGRESS NOTES
Please call her and let her know that Dr Miranda of cardiology shared his visit notes with me, I would recommend she see me in the next 2-4 weeks to evaluate the elevated calcium levels that has been noted. Please have her schedule with me. He wants to see her back in a month from his visit so we could coordinate repeat blood work also at her visit with me. thanks

## 2024-01-04 NOTE — TELEPHONE ENCOUNTER
LVM for pt to schedule 2-4 week follow-up appt.  Please assist w/setting up appt when pt returns call.

## 2024-01-10 ENCOUNTER — DOCUMENTATION ONLY (OUTPATIENT)
Dept: RESPIRATORY THERAPY | Facility: CLINIC | Age: 54
End: 2024-01-10
Payer: COMMERCIAL

## 2024-01-10 NOTE — PROGRESS NOTES
"TREATMENT PLAN AND GOALS: PATIENT INSTRUCTED ON USE AND CARE OF THE PAP EQUIPMENT IN ACCORDANCE WITH THE [facility] PRACTICE GUIDELINES.  MACHINE MODEL AND MODE: RM AIRCURVE 10 BIPAP  PRESSURE SETTING OF: EPAP 6 IPAP 16  DIAGNOSIS: OHS  MASK TYPE ON RX: FFM    Is patient on home oxygen? YES  If yes:  DME Vendor: [facility]  Liter Flow: 1-2 LPM    RX SIGNED BY: WAQAR DOE  REFERRAL SOURCE:  [facility]  DATE RX SIGNED: 12/19/2023  CARLO = 99    DATE PATIENT WAS SETUP ON: 12/20/2023  LOCATION OF SETUP: BEDSIDE AT [facility]  SETUP BY: STEPHANIE    MET PT BEDSIDE FOR BIPAP SETUP. WE WENT OVER THE DEVICE, POWER CORD, HOW TO CONNECT THE TUBING, CHANGE THE FILTERS, AND CONNECTING HER O2 TO TUBING. WE DISCUSSED CLEANING AND CARE FOR THE WATER CHAMBER AND MASK/TUBING. WE TALKED ABOUT COMPLIANCE, USAGE AND HER FTF. I FIT HER FOR THE MASK AND EXPLAINED HOW TO MAKE ADJUSTMENTS AND WHAT WOULD BE A \"BAD LEAK\". I TOLD HER TO CALL THE MAIN LINE DURING BUSINESS HOURS IF SHE HAD QUESTIONS AND THAT I'D CHECK BACK IN A WEEK OR SO FOR COMPLIANCE.  "

## 2024-01-11 ENCOUNTER — TELEPHONE (OUTPATIENT)
Dept: CARDIOLOGY | Facility: CLINIC | Age: 54
End: 2024-01-11
Payer: COMMERCIAL

## 2024-01-11 NOTE — TELEPHONE ENCOUNTER
Lvmm for Pt regarding recommendations-PATIENCE      Received notification that Pt has not been able to be reached for pulmonary referral.   [phone]      From: Abdelrahman Miranda MD  Sent: 1/11/2024   8:02 AM CST  To: Radha Phoenix  Subject: FW: UMP Letter                                   Radha, could we encourage her to see the pulmonologist.    Thanks,    Abdelrahman

## 2024-01-15 NOTE — TELEPHONE ENCOUNTER
3rd attempt:  LVM for pt to call back to schedule 2-4 week follow-up with Michell Lloyd to discuss Cardiology visit.

## 2024-02-08 ENCOUNTER — OFFICE VISIT (OUTPATIENT)
Dept: FAMILY MEDICINE | Facility: CLINIC | Age: 54
End: 2024-02-08
Payer: COMMERCIAL

## 2024-02-08 VITALS
BODY MASS INDEX: 51.38 KG/M2 | TEMPERATURE: 98.4 F | DIASTOLIC BLOOD PRESSURE: 76 MMHG | OXYGEN SATURATION: 95 % | SYSTOLIC BLOOD PRESSURE: 130 MMHG | RESPIRATION RATE: 12 BRPM | HEIGHT: 63 IN | WEIGHT: 290 LBS | HEART RATE: 52 BPM

## 2024-02-08 DIAGNOSIS — N91.2 AMENORRHEA: ICD-10-CM

## 2024-02-08 DIAGNOSIS — Z12.11 SCREEN FOR COLON CANCER: ICD-10-CM

## 2024-02-08 DIAGNOSIS — E11.9 TYPE 2 DIABETES MELLITUS WITHOUT COMPLICATION, WITHOUT LONG-TERM CURRENT USE OF INSULIN (H): Primary | ICD-10-CM

## 2024-02-08 DIAGNOSIS — I50.33 ACUTE ON CHRONIC DIASTOLIC HEART FAILURE (H): ICD-10-CM

## 2024-02-08 DIAGNOSIS — I50.9 CONGESTIVE HEART FAILURE, UNSPECIFIED HF CHRONICITY, UNSPECIFIED HEART FAILURE TYPE (H): ICD-10-CM

## 2024-02-08 LAB
BASOPHILS # BLD AUTO: 0 10E3/UL (ref 0–0.2)
BASOPHILS NFR BLD AUTO: 0 %
EOSINOPHIL # BLD AUTO: 0.2 10E3/UL (ref 0–0.7)
EOSINOPHIL NFR BLD AUTO: 2 %
ERYTHROCYTE [DISTWIDTH] IN BLOOD BY AUTOMATED COUNT: 19.7 % (ref 10–15)
HCT VFR BLD AUTO: 53.9 % (ref 35–47)
HGB BLD-MCNC: 16.3 G/DL (ref 11.7–15.7)
IMM GRANULOCYTES # BLD: 0 10E3/UL
IMM GRANULOCYTES NFR BLD: 0 %
LYMPHOCYTES # BLD AUTO: 3.3 10E3/UL (ref 0.8–5.3)
LYMPHOCYTES NFR BLD AUTO: 32 %
MCH RBC QN AUTO: 25.4 PG (ref 26.5–33)
MCHC RBC AUTO-ENTMCNC: 30.2 G/DL (ref 31.5–36.5)
MCV RBC AUTO: 84 FL (ref 78–100)
MONOCYTES # BLD AUTO: 0.7 10E3/UL (ref 0–1.3)
MONOCYTES NFR BLD AUTO: 7 %
NEUTROPHILS # BLD AUTO: 6 10E3/UL (ref 1.6–8.3)
NEUTROPHILS NFR BLD AUTO: 58 %
PLATELET # BLD AUTO: 190 10E3/UL (ref 150–450)
RBC # BLD AUTO: 6.41 10E6/UL (ref 3.8–5.2)
WBC # BLD AUTO: 10.3 10E3/UL (ref 4–11)

## 2024-02-08 PROCEDURE — 80053 COMPREHEN METABOLIC PANEL: CPT | Performed by: NURSE PRACTITIONER

## 2024-02-08 PROCEDURE — 90471 IMMUNIZATION ADMIN: CPT | Performed by: NURSE PRACTITIONER

## 2024-02-08 PROCEDURE — 85025 COMPLETE CBC W/AUTO DIFF WBC: CPT | Mod: QW | Performed by: NURSE PRACTITIONER

## 2024-02-08 PROCEDURE — 80061 LIPID PANEL: CPT | Performed by: NURSE PRACTITIONER

## 2024-02-08 PROCEDURE — 36415 COLL VENOUS BLD VENIPUNCTURE: CPT | Performed by: NURSE PRACTITIONER

## 2024-02-08 PROCEDURE — 90677 PCV20 VACCINE IM: CPT | Performed by: NURSE PRACTITIONER

## 2024-02-08 PROCEDURE — 82043 UR ALBUMIN QUANTITATIVE: CPT | Performed by: NURSE PRACTITIONER

## 2024-02-08 PROCEDURE — 82570 ASSAY OF URINE CREATININE: CPT | Performed by: NURSE PRACTITIONER

## 2024-02-08 PROCEDURE — 99214 OFFICE O/P EST MOD 30 MIN: CPT | Mod: 25 | Performed by: NURSE PRACTITIONER

## 2024-02-08 RX ORDER — LOSARTAN POTASSIUM 25 MG/1
25 TABLET ORAL DAILY
Qty: 90 TABLET | Refills: 0 | Status: SHIPPED | OUTPATIENT
Start: 2024-02-08 | End: 2024-08-04

## 2024-02-08 RX ORDER — ROSUVASTATIN CALCIUM 40 MG/1
40 TABLET, COATED ORAL DAILY
Qty: 90 TABLET | Refills: 0 | Status: SHIPPED | OUTPATIENT
Start: 2024-02-08 | End: 2024-03-11

## 2024-02-08 RX ORDER — SPIRONOLACTONE 25 MG/1
12.5 TABLET ORAL
Qty: 90 TABLET | Refills: 0 | Status: SHIPPED | OUTPATIENT
Start: 2024-02-08 | End: 2024-08-04

## 2024-02-08 RX ORDER — CLOPIDOGREL BISULFATE 75 MG/1
75 TABLET ORAL DAILY
Qty: 90 TABLET | Refills: 0 | Status: SHIPPED | OUTPATIENT
Start: 2024-02-08 | End: 2024-08-04

## 2024-02-08 ASSESSMENT — PATIENT HEALTH QUESTIONNAIRE - PHQ9
SUM OF ALL RESPONSES TO PHQ QUESTIONS 1-9: 1
SUM OF ALL RESPONSES TO PHQ QUESTIONS 1-9: 1
10. IF YOU CHECKED OFF ANY PROBLEMS, HOW DIFFICULT HAVE THESE PROBLEMS MADE IT FOR YOU TO DO YOUR WORK, TAKE CARE OF THINGS AT HOME, OR GET ALONG WITH OTHER PEOPLE: NOT DIFFICULT AT ALL

## 2024-02-08 NOTE — PROGRESS NOTES
Assessment & Plan     (E11.9) Type 2 diabetes mellitus without complication, without long-term current use of insulin (H)  (primary encounter diagnosis)  Comment:   Plan: Albumin Random Urine Quantitative with Creat         Ratio, losartan (COZAAR) 25 MG tablet,         Comprehensive metabolic panel, Lipid panel         reflex to direct LDL Fasting          Has lost over 50 pounds in the last 3 months but certainly a great deal of that is fluid weight given that she was admitted in December with severe congestive heart failure.  She is feeling much improved and not nearly as short of breath.  She is scheduled to see cardiology next week, did not want her to run out of medications so they were refilled with the understanding that cardiology may change them.  Her blood pressures under control.  She is not currently on a statin and may benefit from that but we will recheck her lipids today given the weight loss and dietary changes that she and her  who is with her today have both implemented.    She has an appointment with Desi Solo RD next week where she will get set up with equipment to test her own blood sugar.  She is very motivated to get her health and particularly diabetes under control.  She is not tolerating the metformin, took 1 dose and had diarrhea and has not restarted it.  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  Blood pressure is currently uncontrolled.  She was started on a cholesterol-lowering medication and losartan and diuretics.  She is having a hard time remembering to take the evening dose of spironolactone    (I50.9) Congestive heart failure, unspecified HF chronicity, unspecified heart failure type (H)  Comment:   Plan: clopidogrel (PLAVIX) 75 MG tablet, losartan         (COZAAR) 25 MG tablet, rosuvastatin (CRESTOR)         40 MG tablet, spironolactone (ALDACTONE) 25  MG         tablet, Comprehensive metabolic panel, CBC with        Platelets & Differential, Lipid panel reflex to        direct LDL Fasting        As above.  Has an appointment with cardiology next month.    (Z12.11) Screen for colon cancer  Comment: He is going to do a fit test for colon cancer screening  Plan: Fecal colorectal cancer screen FIT - Future         (S+30)            (N91.2) Amenorrhea  Comment: When I first met her 3 months ago she had not had a period for a year or 2.  She used to use birth control pills to make her periods come and that is the only way she had regular periods.  She is wondering if she is postmenopausal but an FSH was drawn with her blood work early November and indicates she is premenopausal.  With the lack of periods and being premenopausal and concerned about endometrial thickening and the risks accompanied by that.  Will set her up for a pelvic ultrasound  Plan: US Pelvic Complete with Transvaginal            (I50.33) Acute on chronic diastolic heart failure (H)  Comment:   Plan:                   Mirian Christianson is a 53 year old, presenting for the following health issues:      Wt #346 Nov 1 , 2023 in clinic  Wt today  #290 today.  She just picked up a scale at home as she wasn't able to weight herself.  Some ankle swelling  Watching salt intake    Taking her meds but feels she forgets evening Spironolactone at times, will address with cardiology    She is following restrictive diet, not eating as many carbs, reducing portions.  Her A1c has fallen from 7.2 to 6.7  One dose of metformin didn't settle with her stomach.  Advised to take with evening meal        Does have f/(s) with cardiology on 3/12  Medication Refill and Medication Problem (Trouble with Metformin. )        2/8/2024    11:00 AM   Additional Questions   Roomed by LA     Medication Refill    History of Present Illness       Diabetes:   She presents for follow up of diabetes.    She is not checking blood glucose.  "       She is concerned about other.    She is not experiencing numbness or burning in feet, excessive thirst, blurry vision, weight changes or redness, sores or blisters on feet. The patient has not had a diabetic eye exam in the last 12 months.          Heart Failure:  She presents for follow up of heart failure. She is not experiencing shortness of breath at night, with rest or with activity  She is not experiencing any lower extremity edema.   She denies orthopenea and is not coughing at night. Patient is not checking weight daily. She has recently had a None.  She has no side effects from medications.  She has has a medical visit for heart failure 2 times since the last visit.    Hypertension: She presents for follow up of hypertension.  She does not check blood pressure  regularly outside of the clinic. Outpatient blood pressures have not been over 140/90. She follows a low salt diet.     She eats 2-3 servings of fruits and vegetables daily.She consumes 1 sweetened beverage(s) daily.She exercises with enough effort to increase her heart rate 20 to 29 minutes per day.  She exercises with enough effort to increase her heart rate 3 or less days per week.   She is taking medications regularly.     Last Echo: Echo result w/o MOPS: Interpretation Summary Left ventricular function is decreased. The ejection fraction is 50-55%(borderline).The right ventricle is not well visualized.The right ventricle is mildly dilated.Mildly decreased right ventricular systolic functionSmall pericardial effusionThere are no echocardiographic indications of cardiac tamponade.Technically difficult, suboptimal study.                    Objective    /76 (BP Location: Right arm, Patient Position: Sitting, Cuff Size: Adult Large)   Pulse 52   Temp 98.4  F (36.9  C) (Tympanic)   Resp 12   Ht 1.6 m (5' 3\")   Wt 131.5 kg (290 lb)   LMP  (LMP Unknown)   SpO2 95%   BMI 51.37 kg/m    Body mass index is 51.37 kg/m .  Physical Exam "               Signed Electronically by: Michell Lloyd NP     67

## 2024-02-09 LAB
ALBUMIN SERPL BCG-MCNC: 4.2 G/DL (ref 3.5–5.2)
ALP SERPL-CCNC: 120 U/L (ref 40–150)
ALT SERPL W P-5'-P-CCNC: 90 U/L (ref 0–50)
ANION GAP SERPL CALCULATED.3IONS-SCNC: 7 MMOL/L (ref 7–15)
AST SERPL W P-5'-P-CCNC: 64 U/L (ref 0–45)
BILIRUB SERPL-MCNC: 0.3 MG/DL
BUN SERPL-MCNC: 16.9 MG/DL (ref 6–20)
CALCIUM SERPL-MCNC: 11.2 MG/DL (ref 8.6–10)
CHLORIDE SERPL-SCNC: 102 MMOL/L (ref 98–107)
CHOLEST SERPL-MCNC: 133 MG/DL
CREAT SERPL-MCNC: 0.68 MG/DL (ref 0.51–0.95)
CREAT UR-MCNC: 107 MG/DL
DEPRECATED HCO3 PLAS-SCNC: 27 MMOL/L (ref 22–29)
EGFRCR SERPLBLD CKD-EPI 2021: >90 ML/MIN/1.73M2
FASTING STATUS PATIENT QL REPORTED: NO
GLUCOSE SERPL-MCNC: 106 MG/DL (ref 70–99)
HDLC SERPL-MCNC: 44 MG/DL
LDLC SERPL CALC-MCNC: 60 MG/DL
MICROALBUMIN UR-MCNC: 14.7 MG/L
MICROALBUMIN/CREAT UR: 13.74 MG/G CR (ref 0–25)
NONHDLC SERPL-MCNC: 89 MG/DL
POTASSIUM SERPL-SCNC: 4.7 MMOL/L (ref 3.4–5.3)
PROT SERPL-MCNC: 6.8 G/DL (ref 6.4–8.3)
SODIUM SERPL-SCNC: 136 MMOL/L (ref 135–145)
TRIGL SERPL-MCNC: 144 MG/DL

## 2024-03-04 ENCOUNTER — MYC REFILL (OUTPATIENT)
Dept: FAMILY MEDICINE | Facility: CLINIC | Age: 54
End: 2024-03-04
Payer: COMMERCIAL

## 2024-03-04 DIAGNOSIS — E11.59 TYPE 2 DIABETES MELLITUS WITH OTHER CIRCULATORY COMPLICATION, WITHOUT LONG-TERM CURRENT USE OF INSULIN (H): ICD-10-CM

## 2024-03-04 RX ORDER — ASPIRIN 81 MG/1
81 TABLET ORAL DAILY
Qty: 100 TABLET | Refills: 4 | Status: SHIPPED | OUTPATIENT
Start: 2024-03-04 | End: 2024-08-04

## 2024-03-04 NOTE — TELEPHONE ENCOUNTER
Prescription approved per [facility] Refill Protocol.    Last Written Prescription Date:  11/1/23  Last Fill Quantity: 100,  # refills: 4   Last office visit: 2/8/2024

## 2024-03-10 ENCOUNTER — HEALTH MAINTENANCE LETTER (OUTPATIENT)
Age: 54
End: 2024-03-10

## 2024-03-11 DIAGNOSIS — I50.9 CONGESTIVE HEART FAILURE, UNSPECIFIED HF CHRONICITY, UNSPECIFIED HEART FAILURE TYPE (H): ICD-10-CM

## 2024-03-11 RX ORDER — ROSUVASTATIN CALCIUM 40 MG/1
40 TABLET, COATED ORAL DAILY
Qty: 90 TABLET | Refills: 3 | Status: SHIPPED | OUTPATIENT
Start: 2024-03-11 | End: 2024-08-04

## 2024-03-12 ENCOUNTER — TRANSFERRED RECORDS (OUTPATIENT)
Dept: MULTI SPECIALTY CLINIC | Facility: CLINIC | Age: 54
End: 2024-03-12

## 2024-03-12 LAB — RETINOPATHY: NORMAL

## 2024-03-18 ENCOUNTER — MYC REFILL (OUTPATIENT)
Dept: FAMILY MEDICINE | Facility: CLINIC | Age: 54
End: 2024-03-18
Payer: COMMERCIAL

## 2024-03-18 DIAGNOSIS — Z71.6 ENCOUNTER FOR TOBACCO USE CESSATION COUNSELING: ICD-10-CM

## 2024-03-18 RX ORDER — BUPROPION HYDROCHLORIDE 150 MG/1
150 TABLET ORAL EVERY MORNING
Qty: 90 TABLET | Refills: 2 | Status: SHIPPED | OUTPATIENT
Start: 2024-03-18 | End: 2024-08-04

## 2024-03-18 NOTE — TELEPHONE ENCOUNTER
Prescription approved per [facility] Refill Protocol.    Last Written Prescription Date: 12/13/23  Last Fill Quantity: 90,  # refills: 2   Last office visit: 2/8/2024

## 2024-03-27 ENCOUNTER — MYC REFILL (OUTPATIENT)
Dept: FAMILY MEDICINE | Facility: CLINIC | Age: 54
End: 2024-03-27
Payer: COMMERCIAL

## 2024-03-27 DIAGNOSIS — I50.9 CONGESTIVE HEART FAILURE, UNSPECIFIED HF CHRONICITY, UNSPECIFIED HEART FAILURE TYPE (H): ICD-10-CM

## 2024-03-28 RX ORDER — TORSEMIDE 20 MG/1
20 TABLET ORAL DAILY
Qty: 90 TABLET | Refills: 0 | Status: SHIPPED | OUTPATIENT
Start: 2024-03-28

## 2024-03-28 NOTE — TELEPHONE ENCOUNTER
Per telephone encounter 12/11/23 plan for pt to follow up with her primary cardiologist Dr. Miranda.

## 2024-04-12 ENCOUNTER — TRANSFERRED RECORDS (OUTPATIENT)
Dept: HEALTH INFORMATION MANAGEMENT | Facility: CLINIC | Age: 54
End: 2024-04-12

## 2024-05-19 ENCOUNTER — HEALTH MAINTENANCE LETTER (OUTPATIENT)
Age: 54
End: 2024-05-19

## 2024-08-04 ENCOUNTER — MYC REFILL (OUTPATIENT)
Dept: FAMILY MEDICINE | Facility: CLINIC | Age: 54
End: 2024-08-04
Payer: COMMERCIAL

## 2024-08-04 DIAGNOSIS — E11.9 TYPE 2 DIABETES MELLITUS WITHOUT COMPLICATION, WITHOUT LONG-TERM CURRENT USE OF INSULIN (H): ICD-10-CM

## 2024-08-04 DIAGNOSIS — I50.9 CONGESTIVE HEART FAILURE, UNSPECIFIED HF CHRONICITY, UNSPECIFIED HEART FAILURE TYPE (H): ICD-10-CM

## 2024-08-04 DIAGNOSIS — Z71.6 ENCOUNTER FOR TOBACCO USE CESSATION COUNSELING: ICD-10-CM

## 2024-08-04 DIAGNOSIS — E11.59 TYPE 2 DIABETES MELLITUS WITH OTHER CIRCULATORY COMPLICATION, WITHOUT LONG-TERM CURRENT USE OF INSULIN (H): ICD-10-CM

## 2024-08-05 RX ORDER — ROSUVASTATIN CALCIUM 40 MG/1
40 TABLET, COATED ORAL DAILY
Qty: 90 TABLET | Refills: 3 | Status: SHIPPED | OUTPATIENT
Start: 2024-08-05

## 2024-08-05 RX ORDER — CLOPIDOGREL BISULFATE 75 MG/1
75 TABLET ORAL DAILY
Qty: 90 TABLET | Refills: 0 | Status: SHIPPED | OUTPATIENT
Start: 2024-08-05

## 2024-08-05 RX ORDER — LOSARTAN POTASSIUM 25 MG/1
25 TABLET ORAL DAILY
Qty: 90 TABLET | Refills: 0 | Status: SHIPPED | OUTPATIENT
Start: 2024-08-05

## 2024-08-05 RX ORDER — SPIRONOLACTONE 25 MG/1
12.5 TABLET ORAL
Qty: 90 TABLET | Refills: 0 | Status: SHIPPED | OUTPATIENT
Start: 2024-08-05

## 2024-08-05 RX ORDER — BUPROPION HYDROCHLORIDE 150 MG/1
150 TABLET ORAL EVERY MORNING
Qty: 90 TABLET | Refills: 2 | Status: SHIPPED | OUTPATIENT
Start: 2024-08-05

## 2024-08-05 RX ORDER — ASPIRIN 81 MG/1
81 TABLET ORAL DAILY
Qty: 100 TABLET | Refills: 4 | Status: SHIPPED | OUTPATIENT
Start: 2024-08-05

## 2024-08-07 RX ORDER — TORSEMIDE 20 MG/1
20 TABLET ORAL DAILY
Qty: 90 TABLET | Refills: 0 | OUTPATIENT
Start: 2024-08-07

## 2024-08-07 NOTE — TELEPHONE ENCOUNTER
"Pt last seen by KARLOS 1/2/24. Follow-up due 3/2024. Per scheduling note: \"3/28 LM TO SCHEDULE WITH ANY GEN CARD WHO GOES TO  - OR WITH DR REECE AT ANOTHER LOCATION  - \".    Torsemide Rx last refilled 3/28/24 with note \"Pt needs appt. No further refills\". LMS  "

## 2024-10-05 ENCOUNTER — HEALTH MAINTENANCE LETTER (OUTPATIENT)
Age: 54
End: 2024-10-05

## 2024-11-06 ENCOUNTER — OFFICE VISIT (OUTPATIENT)
Dept: FAMILY MEDICINE | Facility: CLINIC | Age: 54
End: 2024-11-06
Payer: COMMERCIAL

## 2024-11-06 ENCOUNTER — PATIENT OUTREACH (OUTPATIENT)
Dept: CARE COORDINATION | Facility: CLINIC | Age: 54
End: 2024-11-06

## 2024-11-06 VITALS
TEMPERATURE: 98 F | SYSTOLIC BLOOD PRESSURE: 132 MMHG | BODY MASS INDEX: 51.91 KG/M2 | DIASTOLIC BLOOD PRESSURE: 86 MMHG | OXYGEN SATURATION: 96 % | HEIGHT: 63 IN | HEART RATE: 77 BPM | WEIGHT: 293 LBS

## 2024-11-06 DIAGNOSIS — E66.01 MORBID OBESITY (H): ICD-10-CM

## 2024-11-06 DIAGNOSIS — I50.9 CONGESTIVE HEART FAILURE, UNSPECIFIED HF CHRONICITY, UNSPECIFIED HEART FAILURE TYPE (H): ICD-10-CM

## 2024-11-06 DIAGNOSIS — Z12.31 VISIT FOR SCREENING MAMMOGRAM: Primary | ICD-10-CM

## 2024-11-06 DIAGNOSIS — N91.2 AMENORRHEA: ICD-10-CM

## 2024-11-06 DIAGNOSIS — I70.0 ATHEROSCLEROSIS OF AORTA (H): ICD-10-CM

## 2024-11-06 DIAGNOSIS — R06.02 SOB (SHORTNESS OF BREATH): ICD-10-CM

## 2024-11-06 DIAGNOSIS — F33.0 MILD RECURRENT MAJOR DEPRESSION (H): ICD-10-CM

## 2024-11-06 DIAGNOSIS — E11.59 TYPE 2 DIABETES MELLITUS WITH OTHER CIRCULATORY COMPLICATION, WITHOUT LONG-TERM CURRENT USE OF INSULIN (H): ICD-10-CM

## 2024-11-06 DIAGNOSIS — I10 HYPERTENSION, UNSPECIFIED TYPE: ICD-10-CM

## 2024-11-06 DIAGNOSIS — E11.9 TYPE 2 DIABETES MELLITUS WITHOUT COMPLICATION, WITHOUT LONG-TERM CURRENT USE OF INSULIN (H): ICD-10-CM

## 2024-11-06 LAB
EST. AVERAGE GLUCOSE BLD GHB EST-MCNC: 134 MG/DL
HBA1C MFR BLD: 6.3 % (ref 0–5.6)

## 2024-11-06 PROCEDURE — 80048 BASIC METABOLIC PNL TOTAL CA: CPT | Performed by: NURSE PRACTITIONER

## 2024-11-06 PROCEDURE — 99214 OFFICE O/P EST MOD 30 MIN: CPT | Mod: 25 | Performed by: NURSE PRACTITIONER

## 2024-11-06 PROCEDURE — 90471 IMMUNIZATION ADMIN: CPT | Performed by: NURSE PRACTITIONER

## 2024-11-06 PROCEDURE — 82043 UR ALBUMIN QUANTITATIVE: CPT | Performed by: NURSE PRACTITIONER

## 2024-11-06 PROCEDURE — 90673 RIV3 VACCINE NO PRESERV IM: CPT | Performed by: NURSE PRACTITIONER

## 2024-11-06 PROCEDURE — 83036 HEMOGLOBIN GLYCOSYLATED A1C: CPT | Performed by: NURSE PRACTITIONER

## 2024-11-06 PROCEDURE — 82570 ASSAY OF URINE CREATININE: CPT | Performed by: NURSE PRACTITIONER

## 2024-11-06 PROCEDURE — 83001 ASSAY OF GONADOTROPIN (FSH): CPT | Performed by: NURSE PRACTITIONER

## 2024-11-06 PROCEDURE — 36415 COLL VENOUS BLD VENIPUNCTURE: CPT | Performed by: NURSE PRACTITIONER

## 2024-11-06 RX ORDER — TORSEMIDE 20 MG/1
20 TABLET ORAL DAILY
Qty: 90 TABLET | Refills: 1 | Status: SHIPPED | OUTPATIENT
Start: 2024-11-06

## 2024-11-06 RX ORDER — CLOPIDOGREL BISULFATE 75 MG/1
75 TABLET ORAL DAILY
Qty: 90 TABLET | Refills: 1 | Status: SHIPPED | OUTPATIENT
Start: 2024-11-06

## 2024-11-06 RX ORDER — SPIRONOLACTONE 25 MG/1
12.5 TABLET ORAL
Qty: 90 TABLET | Refills: 0 | Status: SHIPPED | OUTPATIENT
Start: 2024-11-06

## 2024-11-06 RX ORDER — ALBUTEROL SULFATE 90 UG/1
2 INHALANT RESPIRATORY (INHALATION) EVERY 6 HOURS PRN
Qty: 18 G | Refills: 3 | Status: SHIPPED | OUTPATIENT
Start: 2024-11-06

## 2024-11-06 RX ORDER — LOSARTAN POTASSIUM 25 MG/1
25 TABLET ORAL DAILY
Qty: 90 TABLET | Refills: 3 | Status: SHIPPED | OUTPATIENT
Start: 2024-11-06

## 2024-11-06 RX ORDER — NICOTINE 21 MG/24HR
1 PATCH, TRANSDERMAL 24 HOURS TRANSDERMAL EVERY 24 HOURS
Qty: 60 PATCH | Refills: 1 | Status: SHIPPED | OUTPATIENT
Start: 2024-11-06

## 2024-11-06 ASSESSMENT — PATIENT HEALTH QUESTIONNAIRE - PHQ9
SUM OF ALL RESPONSES TO PHQ QUESTIONS 1-9: 3
10. IF YOU CHECKED OFF ANY PROBLEMS, HOW DIFFICULT HAVE THESE PROBLEMS MADE IT FOR YOU TO DO YOUR WORK, TAKE CARE OF THINGS AT HOME, OR GET ALONG WITH OTHER PEOPLE: SOMEWHAT DIFFICULT
SUM OF ALL RESPONSES TO PHQ QUESTIONS 1-9: 3

## 2024-11-06 NOTE — PROGRESS NOTES
Assessment & Plan     (Z12.31) Visit for screening mammogram  (primary encounter diagnosis)  Comment:   Plan: MA Screening Bilateral w/ Bill        Due the end of this month    (I50.9) Congestive heart failure, unspecified HF chronicity, unspecified heart failure type (H)  Comment:   Plan: torsemide (DEMADEX) 20 MG tablet,         spironolactone (ALDACTONE) 25 MG tablet,         nicotine (NICODERM CQ) 14 MG/24HR 24 hr patch,         losartan (COZAAR) 25 MG tablet, clopidogrel         (PLAVIX) 75 MG tablet, Adult Cardiology [facility] Referral          She did state that she did see me last November and before she was able to start a cardiac workup she actually was admitted to the hospital and congestive heart failure.  She tells me that she did not follow-up with Dr. Abdelrahman Miranda understands that he has reduced his hours overall and is no longer coming to this clinic so she would like to reconnect with a different cardiologist at this time.  She she was not having any shortness of breath although she ran out of her heart medication about 2 weeks ago and would like refills on those medications.  She tells me she was started on the Plavix because she was going to have a procedure done but in the end it was opted not to do the procedure.  I can see from her diagnoses that she has atherosclerosis of the aorta.  I agreed to keep her on the Plavix and torsemide until she is reevaluated by cardiology but she does need to keep that appointment.  She does not she does not quit smoking but she is interested in using the next term patch again.  I emphasized again that she cannot smoke with the patch although this would be doubling her nicotine absorption.  Her blood pressure is fairly well-controlled today especially given the fact she has been out of medication for 2 weeks.    (R06.02) SOB (shortness of breath)  Comment:   Plan: nicotine (NICODERM CQ) 14 MG/24HR 24 hr patch,         albuterol (PROAIR  HFA/PROVENTIL HFA/VENTOLIN         HFA) 108 (90 Base) MCG/ACT inhaler            (E66.01) Morbid obesity (H)  Comment:   Plan: nicotine (NICODERM CQ) 14 MG/24HR 24 hr patch        Not seeing any evidence of this much success with weight loss although she can tell me about some of the dietary changes that she and her  may have made specifically not adding salt    (I10) Hypertension, unspecified type  Comment:   Plan: BASIC METABOLIC PANEL, nicotine (NICODERM CQ)         14 MG/24HR 24 hr patch            (I70.0) Atherosclerosis of aorta (H)  Comment:   Plan: nicotine (NICODERM CQ) 14 MG/24HR 24 hr patch            (E11.9) Type 2 diabetes mellitus without complication, without long-term current use of insulin (H)  Comment:   Plan: HEMOGLOBIN A1C, REVIEW OF HEALTH MAINTENANCE         PROTOCOL ORDERS, losartan (COZAAR) 25 MG         tablet, Albumin Random Urine Quantitative with         Creat Ratio            (N91.2) Amenorrhea  Comment:   Plan: Follicle stimulating hormone        She also saw me last November and reported that she had not had a period for 2 years.  I checked her FSH level and it was 10.4 which certainly does not put her in the postmenopausal range.  I ordered a pelvic ultrasound to check for endometrial thickening and she never followed through with the ultrasound.  She is ready to follow through at this time and was given information on how to schedule.    (F33.0) Mild recurrent major depression (H)  Comment:   Plan: Last November when we first met she also voiced depression she will need a few doses of the bupropion but did not think was helpful.  I offered today to start an SSRI but she wants to think about it.  Right now her only symptoms seem to be that she is distracted and gets frustrated with that    (E11.59) Type 2 diabetes mellitus with other circulatory complication, without long-term current use of insulin (H)  Comment:   Plan:   Mariana was diagnosed 1 year ago with a hemoglobin A1c  "of 7.2, I do not see that there has been a follow-up A1c and she told me she tried metformin and did not tolerate it so she has not been using that medication.  Up until 2 weeks ago she had been on the rosuvastatin but not using other than a few dietary changes any medication specific for diabetes.  She did follow through with an eye exam and we will get that report from [facility]    2        BMI  Estimated body mass index is 54.86 kg/m  as calculated from the following:    Height as of this encounter: 1.6 m (5' 3\").    Weight as of this encounter: 140.5 kg (309 lb 11.2 oz).   Weight management plan: Discussed healthy diet and exercise guidelines      Work on weight loss  Regular exercise    Mirian Christianson is a 54 year old, presenting for the following health issues: here for chronic disease management and med check/refills, has been without meds for several weeks - does not want to continue with Wellbutrin or Metformin, wants to know if she can take Spironolactone once daily,  Recheck Medication, Diabetes, and Heart Problem        11/6/2024     1:25 PM   Additional Questions   Roomed by rebecca obando   Accompanied by partner: John     Via the Health Maintenance questionnaire, the patient has reported the following services have been completed -Eye Exam: [facility] 2024-03-12, this information has been sent to the abstraction team.    Heart Problem    History of Present Illness       Heart Failure:  She presents for follow up of heart failure. She is experiencing shortness of breath with activity only, which is same as usual. She is experiencing lower extremity edema which is same as usual.   She denies orthopenea and coughs at night. Patient is not checking weight daily. She has recently had a None.  She has no side effects from medications.  She has had no other medical visits for heart failure since the last visit.    Hypertension: She presents for follow up of hypertension.  She does not check blood pressure  " "regularly outside of the clinic. Outpatient blood pressures have not been over 140/90. She does not follow a low salt diet.     Reason for visit:  Prescription refills,follow up    She eats 2-3 servings of fruits and vegetables daily.She consumes 1 sweetened beverage(s) daily.She exercises with enough effort to increase her heart rate 20 to 29 minutes per day.  She exercises with enough effort to increase her heart rate 3 or less days per week. She is missing 5 dose(s) of medications per week.     Last Echo:   Echo result w/o MOPS: Interpretation Summary Left ventricular function is decreased. The ejection fraction is 50-55%(borderline).The right ventricle is not well visualized.The right ventricle is mildly dilated.Mildly decreased right ventricular systolic functionSmall pericardial effusionThere are no echocardiographic indications of cardiac tamponade.Technically difficult, suboptimal study.                    Objective    /86 (BP Location: Right arm, Patient Position: Sitting)   Pulse 77   Temp 98  F (36.7  C)   Ht 1.6 m (5' 3\")   Wt 140.5 kg (309 lb 11.2 oz)   LMP  (LMP Unknown)   SpO2 96%   Breastfeeding No   BMI 54.86 kg/m    Body mass index is 54.86 kg/m .  Physical Exam     GENERAL: alert and no distress  NECK: no adenopathy, no asymmetry, masses, or scars  RESP: lungs clear to auscultation - no rales, rhonchi or wheezes  CV: regular rate and rhythm, normal S1 S2, no S3 or S4, no murmur, click or rub, no peripheral edema  MS: no gross musculoskeletal defects noted, no edema  Diabetic foot exam: normal DP and PT pulses, no trophic changes or ulcerative lesions, normal sensory exam, and normal monofilament exam            Signed Electronically by: Michell Lloyd NP    "

## 2024-11-07 ENCOUNTER — PATIENT OUTREACH (OUTPATIENT)
Dept: CARE COORDINATION | Facility: CLINIC | Age: 54
End: 2024-11-07

## 2024-11-07 LAB
ANION GAP SERPL CALCULATED.3IONS-SCNC: 7 MMOL/L (ref 7–15)
BUN SERPL-MCNC: 9.6 MG/DL (ref 6–20)
CALCIUM SERPL-MCNC: 10.8 MG/DL (ref 8.8–10.4)
CHLORIDE SERPL-SCNC: 102 MMOL/L (ref 98–107)
CREAT SERPL-MCNC: 0.47 MG/DL (ref 0.51–0.95)
CREAT UR-MCNC: 96.2 MG/DL
EGFRCR SERPLBLD CKD-EPI 2021: >90 ML/MIN/1.73M2
FSH SERPL IRP2-ACNC: 13.1 MIU/ML
GLUCOSE SERPL-MCNC: 129 MG/DL (ref 70–99)
HCO3 SERPL-SCNC: 27 MMOL/L (ref 22–29)
MICROALBUMIN UR-MCNC: 21.7 MG/L
MICROALBUMIN/CREAT UR: 22.56 MG/G CR (ref 0–25)
POTASSIUM SERPL-SCNC: 4.7 MMOL/L (ref 3.4–5.3)
SODIUM SERPL-SCNC: 136 MMOL/L (ref 135–145)

## 2024-11-08 ENCOUNTER — DOCUMENTATION ONLY (OUTPATIENT)
Dept: FAMILY MEDICINE | Facility: CLINIC | Age: 54
End: 2024-11-08
Payer: COMMERCIAL

## 2024-11-08 DIAGNOSIS — I50.33 ACUTE ON CHRONIC DIASTOLIC HEART FAILURE (H): ICD-10-CM

## 2024-11-08 DIAGNOSIS — R06.02 SOB (SHORTNESS OF BREATH): ICD-10-CM

## 2024-11-08 DIAGNOSIS — I50.9 CHRONIC HEART FAILURE, UNSPECIFIED HEART FAILURE TYPE (H): Primary | ICD-10-CM

## 2025-01-05 ENCOUNTER — MYC REFILL (OUTPATIENT)
Dept: FAMILY MEDICINE | Facility: CLINIC | Age: 55
End: 2025-01-05

## 2025-01-05 ENCOUNTER — OFFICE VISIT (OUTPATIENT)
Dept: URGENT CARE | Facility: URGENT CARE | Age: 55
End: 2025-01-05
Payer: COMMERCIAL

## 2025-01-05 VITALS
SYSTOLIC BLOOD PRESSURE: 152 MMHG | RESPIRATION RATE: 18 BRPM | DIASTOLIC BLOOD PRESSURE: 93 MMHG | TEMPERATURE: 98.1 F | WEIGHT: 293 LBS | OXYGEN SATURATION: 96 % | HEART RATE: 97 BPM | BODY MASS INDEX: 55.98 KG/M2

## 2025-01-05 DIAGNOSIS — K04.7 DENTAL ABSCESS: Primary | ICD-10-CM

## 2025-01-05 DIAGNOSIS — R29.810 FACIAL DROOP: ICD-10-CM

## 2025-01-05 DIAGNOSIS — R06.02 SOB (SHORTNESS OF BREATH): ICD-10-CM

## 2025-01-05 PROCEDURE — 99214 OFFICE O/P EST MOD 30 MIN: CPT | Performed by: PHYSICIAN ASSISTANT

## 2025-01-05 NOTE — PROGRESS NOTES
Assessment & Plan     Dental abscess  Pt has obvious dental abscess on the R maxilla, adjacent to 1st maxillary R molar which is fractured.      Facial droop  Facial droop is most likely secondary to mass effect on peripheral nerve , no sensation change, no eye weakness, no other signs of CVA thus most likely peripheral but given sigificance of the abscess and facial drooping I have asked pt to be elvated in the ED to exclude deep space infection and consideration of drainage of the dental abscess which is outside the scope of this urgent care setting.    Discussed with EDMD who is agreeable to see however does indicate that if concern is central cause would be most appropriate to send to center with MRI availablity.  Given this is subacute and timing occurred surrounding the development of the abscess and no other sx of CVA including sensation change, UE/LE weakness I do not believe CVA is likely.  Given this pt will be seen at [facility] but I did let her know if the ED provided identified any concern for central cause or deep space infection she may need to be transferred.            Aparna Choi PA-C  [facility] [place]    Mirian Christianson is a 54 year old female who presents to clinic today for the following health issues:  Chief Complaint   Patient presents with    Oral Swelling     Infected tooth, swelling on right side, since new years has a broke tooth (for a long time), has not made a dental appt         1/5/2025     1:49 PM   Additional Questions   Roomed by chepe humphrey     HPI  Pt is a 54 year old female who presents with concern re: possible dental infection. She had fx tooth in the area, over the last week increased pain, swelling on the R side starting on 1-1-24.    No fevers   Swelling, pain.   No drainage.    No T/N/W of UE or LE or face.    She has been taking serial pictures given noting swelling and some facial drooping on the R which is more evident with smiling.        Review of Systems  Constitutional, HEENT, cardiovascular, pulmonary, gi and gu systems are negative, except as otherwise noted.    Patient Active Problem List   Diagnosis    Type 2 diabetes mellitus with other circulatory complication, without long-term current use of insulin (H)    Atherosclerosis of aorta (H)    Nonspecific abnormal electrocardiogram (ECG) (EKG)    Daytime somnolence    Tobacco abuse    Morbid obesity (H)    SOB (shortness of breath)    Congestive heart failure, unspecified HF chronicity, unspecified heart failure type (H)    Guttate psoriasis    Pulmonary hypertension (H)    Nicotine dependence    Acute on chronic diastolic heart failure (H)    Coronary artery disease involving native coronary artery of native heart without angina pectoris    Amenorrhea    Mild recurrent major depression (H)     Current Outpatient Medications   Medication Sig Dispense Refill    aspirin 81 MG EC tablet Take 1 tablet (81 mg) by mouth daily 100 tablet 4    BETA BLOCKER NOT PRESCRIBED (INTENTIONAL) Please choose reason not prescribed from choices below.      clopidogrel (PLAVIX) 75 MG tablet Take 1 tablet (75 mg) by mouth daily. 90 tablet 1    losartan (COZAAR) 25 MG tablet Take 1 tablet (25 mg) by mouth daily. 90 tablet 3    nicotine (NICODERM CQ) 14 MG/24HR 24 hr patch Place 1 patch onto the skin every 24 hours. 60 patch 1    rosuvastatin (CRESTOR) 40 MG tablet Take 1 tablet (40 mg) by mouth daily 90 tablet 3    spironolactone (ALDACTONE) 25 MG tablet Take 0.5 tablets (12.5 mg) by mouth 2 times daily. 90 tablet 0    torsemide (DEMADEX) 20 MG tablet Take 1 tablet (20 mg) by mouth daily. 90 tablet 1    albuterol (PROAIR HFA/PROVENTIL HFA/VENTOLIN HFA) 108 (90 Base) MCG/ACT inhaler Inhale 2 puffs into the lungs every 6 hours as needed for shortness of breath, wheezing or cough. (Patient not taking: Reported on 1/5/2025) 18 g 3     No current facility-administered medications for this visit.           Objective     BP (!) 152/93   Pulse 97   Temp 98.1  F (36.7  C) (Oral)   Resp 18   Wt 143.3 kg (316 lb)   LMP  (LMP Unknown)   SpO2 96%   BMI 55.98 kg/m    Physical Exam   Nad appears well  Exam of the mouth reveals an erythematous edematous fluctuant tender mass approximately 1 cm in length x 5-6 mm width R maxillary gingival mucosa buccal surface.  It is adjacent to the 1st R maxillary molar, no active drainage. Floor of the mouth is soft, neck supple no adenopathy. Facial exam with mild swelling on the R maxilla.  Obvious weakness of the R smile and at the R nasolabial fold.  Normal wrinkle of the nose and the forehead.  Weakness with smile on the R.

## 2025-01-06 RX ORDER — ALBUTEROL SULFATE 90 UG/1
2 INHALANT RESPIRATORY (INHALATION) EVERY 6 HOURS PRN
Qty: 18 G | Refills: 3 | Status: SHIPPED | OUTPATIENT
Start: 2025-01-06

## 2025-02-16 ENCOUNTER — HEALTH MAINTENANCE LETTER (OUTPATIENT)
Age: 55
End: 2025-02-16

## 2025-03-16 ENCOUNTER — HEALTH MAINTENANCE LETTER (OUTPATIENT)
Age: 55
End: 2025-03-16

## 2025-05-18 ENCOUNTER — HEALTH MAINTENANCE LETTER (OUTPATIENT)
Age: 55
End: 2025-05-18

## 2025-05-29 ENCOUNTER — PATIENT OUTREACH (OUTPATIENT)
Dept: CARE COORDINATION | Facility: CLINIC | Age: 55
End: 2025-05-29
Payer: COMMERCIAL

## 2025-07-14 ENCOUNTER — MYC REFILL (OUTPATIENT)
Dept: FAMILY MEDICINE | Facility: CLINIC | Age: 55
End: 2025-07-14
Payer: COMMERCIAL

## 2025-07-14 DIAGNOSIS — I50.9 CONGESTIVE HEART FAILURE, UNSPECIFIED HF CHRONICITY, UNSPECIFIED HEART FAILURE TYPE (H): ICD-10-CM

## 2025-07-14 DIAGNOSIS — E11.9 TYPE 2 DIABETES MELLITUS WITHOUT COMPLICATION, WITHOUT LONG-TERM CURRENT USE OF INSULIN (H): ICD-10-CM

## 2025-07-14 DIAGNOSIS — R06.02 SOB (SHORTNESS OF BREATH): ICD-10-CM

## 2025-07-14 DIAGNOSIS — E11.59 TYPE 2 DIABETES MELLITUS WITH OTHER CIRCULATORY COMPLICATION, WITHOUT LONG-TERM CURRENT USE OF INSULIN (H): ICD-10-CM

## 2025-07-15 RX ORDER — ASPIRIN 81 MG/1
81 TABLET ORAL DAILY
Qty: 100 TABLET | Refills: 0 | Status: SHIPPED | OUTPATIENT
Start: 2025-07-15

## 2025-07-15 RX ORDER — CLOPIDOGREL BISULFATE 75 MG/1
75 TABLET ORAL DAILY
Qty: 10 TABLET | Refills: 0 | Status: SHIPPED | OUTPATIENT
Start: 2025-07-15

## 2025-07-15 RX ORDER — LOSARTAN POTASSIUM 25 MG/1
25 TABLET ORAL DAILY
Qty: 10 TABLET | Refills: 0 | Status: SHIPPED | OUTPATIENT
Start: 2025-07-15

## 2025-07-15 RX ORDER — TORSEMIDE 20 MG/1
20 TABLET ORAL DAILY
Qty: 10 TABLET | Refills: 0 | Status: SHIPPED | OUTPATIENT
Start: 2025-07-15

## 2025-07-15 RX ORDER — SPIRONOLACTONE 25 MG/1
12.5 TABLET ORAL
Qty: 10 TABLET | Refills: 0 | Status: SHIPPED | OUTPATIENT
Start: 2025-07-15

## 2025-07-15 RX ORDER — ALBUTEROL SULFATE 90 UG/1
2 INHALANT RESPIRATORY (INHALATION) EVERY 6 HOURS PRN
Qty: 18 G | Refills: 3 | Status: SHIPPED | OUTPATIENT
Start: 2025-07-15

## 2025-07-15 NOTE — TELEPHONE ENCOUNTER
I sent 10 day supply meds, please call her to schedule with me to get lab work done and review bp as she is not at desired range. thanks

## 2025-07-15 NOTE — TELEPHONE ENCOUNTER
Contacts       Contact Date/Time Type Contact Phone/Fax    07/14/2025 10:57 PM CDT Web (Incoming) Sammy Laws (Self)           Attempted to reach patient to: Schedule an appointment    When patient returns call, please take this action: Assist with scheduling    Reason for the visit: Bp check and labs     When to schedule: Next available    Additional comments/info: Per Michell Lloyd     If unable to schedule: Transfer to  line (or update telephone encounter in after-hours)

## 2025-08-31 ENCOUNTER — HEALTH MAINTENANCE LETTER (OUTPATIENT)
Age: 55
End: 2025-08-31

## (undated) DEVICE — GUIDEWIRE STRT .025 SCN M001491001

## (undated) DEVICE — CATH ANGIO JUDKINS R4 6FRX100CM INFINITI 534621T

## (undated) DEVICE — GUIDEWIRE VASCULAR COMET II 185CML PRESSURE H74939359110

## (undated) DEVICE — KIT HAND CONTROL ACIST 014644 AR-P54

## (undated) DEVICE — CATH SWAN 7FR X 110CM

## (undated) DEVICE — INTRO MICRO MINI STICK 4FR STD NITINOL

## (undated) DEVICE — ELECTRODE DEFIB CADENCE 22550R

## (undated) DEVICE — SLEEVE TR BAND RADIAL COMPRESSION DEVICE 24CM TRB24-REG

## (undated) DEVICE — INTRO GLIDESHEATH SLENDER 6FR 10X45CM 60-1060

## (undated) DEVICE — MANIFOLD KIT ANGIO AUTOMATED 014613

## (undated) DEVICE — CATH DIAGNOSTIC RADIAL 5FR TIG 4.0

## (undated) DEVICE — INTRO SHEATH 7FRX10CM PINNACLE RSS702

## (undated) DEVICE — CATH LAUNCHER 6FR EBU 30 LA6EBU30

## (undated) DEVICE — EXCHANGE WIRE .035 260 STAR/JFC/035/260/ M001491681

## (undated) DEVICE — SYR ANGIOGRAPHY MULTIUSE KIT ACIST 014612

## (undated) DEVICE — CATH PULM ART 7FR X 110CM, SWA

## (undated) DEVICE — CUSTOM PACK CORONARY SAN5BCRHEA

## (undated) DEVICE — VALVE HEMOSTATIC WATCHDOG 8FR INNER LUMEN H74939343021

## (undated) RX ORDER — FUROSEMIDE 10 MG/ML
INJECTION INTRAMUSCULAR; INTRAVENOUS
Status: DISPENSED
Start: 2023-12-08

## (undated) RX ORDER — FENTANYL CITRATE 50 UG/ML
INJECTION, SOLUTION INTRAMUSCULAR; INTRAVENOUS
Status: DISPENSED
Start: 2023-12-08